# Patient Record
Sex: FEMALE | Race: WHITE | Employment: UNEMPLOYED | URBAN - METROPOLITAN AREA
[De-identification: names, ages, dates, MRNs, and addresses within clinical notes are randomized per-mention and may not be internally consistent; named-entity substitution may affect disease eponyms.]

---

## 2017-02-03 ENCOUNTER — TELEPHONE (OUTPATIENT)
Dept: FAMILY MEDICINE CLINIC | Age: 57
End: 2017-02-03

## 2017-02-03 RX ORDER — CYCLOBENZAPRINE HCL 10 MG
10 TABLET ORAL 2 TIMES DAILY PRN
Qty: 30 TABLET | Refills: 0 | Status: SHIPPED | OUTPATIENT
Start: 2017-02-03 | End: 2017-02-13

## 2017-02-15 ENCOUNTER — TELEPHONE (OUTPATIENT)
Dept: FAMILY MEDICINE CLINIC | Age: 57
End: 2017-02-15

## 2017-02-17 RX ORDER — BUTALBITAL, ACETAMINOPHEN AND CAFFEINE 50; 325; 40 MG/1; MG/1; MG/1
TABLET ORAL
Qty: 60 TABLET | Refills: 5 | Status: SHIPPED | OUTPATIENT
Start: 2017-02-17 | End: 2017-12-06 | Stop reason: SDUPTHER

## 2017-03-01 ENCOUNTER — OFFICE VISIT (OUTPATIENT)
Dept: FAMILY MEDICINE CLINIC | Age: 57
End: 2017-03-01

## 2017-03-01 VITALS
TEMPERATURE: 98.3 F | SYSTOLIC BLOOD PRESSURE: 132 MMHG | DIASTOLIC BLOOD PRESSURE: 86 MMHG | BODY MASS INDEX: 20.5 KG/M2 | WEIGHT: 130.6 LBS | RESPIRATION RATE: 16 BRPM | HEIGHT: 67 IN | HEART RATE: 80 BPM

## 2017-03-01 DIAGNOSIS — G89.29 OTHER CHRONIC PAIN: Primary | ICD-10-CM

## 2017-03-01 DIAGNOSIS — G43.909 MIGRAINE WITHOUT STATUS MIGRAINOSUS, NOT INTRACTABLE, UNSPECIFIED MIGRAINE TYPE: ICD-10-CM

## 2017-03-01 DIAGNOSIS — G25.81 RLS (RESTLESS LEGS SYNDROME): ICD-10-CM

## 2017-03-01 DIAGNOSIS — M41.24 OTHER IDIOPATHIC SCOLIOSIS, THORACIC REGION: ICD-10-CM

## 2017-03-01 PROCEDURE — 3017F COLORECTAL CA SCREEN DOC REV: CPT | Performed by: FAMILY MEDICINE

## 2017-03-01 PROCEDURE — 99213 OFFICE O/P EST LOW 20 MIN: CPT | Performed by: FAMILY MEDICINE

## 2017-03-01 PROCEDURE — G8420 CALC BMI NORM PARAMETERS: HCPCS | Performed by: FAMILY MEDICINE

## 2017-03-01 PROCEDURE — 3014F SCREEN MAMMO DOC REV: CPT | Performed by: FAMILY MEDICINE

## 2017-03-01 PROCEDURE — G8484 FLU IMMUNIZE NO ADMIN: HCPCS | Performed by: FAMILY MEDICINE

## 2017-03-01 PROCEDURE — G8427 DOCREV CUR MEDS BY ELIG CLIN: HCPCS | Performed by: FAMILY MEDICINE

## 2017-03-01 PROCEDURE — 1036F TOBACCO NON-USER: CPT | Performed by: FAMILY MEDICINE

## 2017-03-01 RX ORDER — HYDROCODONE BITARTRATE AND IBUPROFEN 7.5; 2 MG/1; MG/1
TABLET, FILM COATED ORAL
Qty: 450 TABLET | Refills: 0 | Status: SHIPPED | OUTPATIENT
Start: 2017-03-01 | End: 2017-06-02 | Stop reason: SDUPTHER

## 2017-03-01 RX ORDER — TIZANIDINE 4 MG/1
4 TABLET ORAL EVERY 8 HOURS PRN
Qty: 30 TABLET | Refills: 3 | Status: SHIPPED | OUTPATIENT
Start: 2017-03-01 | End: 2018-05-08

## 2017-03-01 RX ORDER — ZOLPIDEM TARTRATE 10 MG/1
TABLET ORAL
Qty: 90 TABLET | Refills: 1 | Status: SHIPPED | OUTPATIENT
Start: 2017-03-01 | End: 2017-06-02 | Stop reason: SDUPTHER

## 2017-03-01 ASSESSMENT — ENCOUNTER SYMPTOMS
COUGH: 0
CONSTIPATION: 0
EYES NEGATIVE: 1
VOMITING: 0
SHORTNESS OF BREATH: 0
NAUSEA: 0
ABDOMINAL PAIN: 0
DIARRHEA: 0

## 2017-03-10 RX ORDER — ESTRADIOL 0.1 MG/G
CREAM VAGINAL
Qty: 42.5 G | Refills: 1 | Status: SHIPPED | OUTPATIENT
Start: 2017-03-10 | End: 2017-05-25 | Stop reason: SDUPTHER

## 2017-03-23 ENCOUNTER — HOSPITAL ENCOUNTER (OUTPATIENT)
Dept: GENERAL RADIOLOGY | Age: 57
Discharge: HOME | End: 2017-03-23
Payer: COMMERCIAL

## 2017-03-23 DIAGNOSIS — Z13.820 SCREENING FOR OSTEOPOROSIS: ICD-10-CM

## 2017-03-23 PROCEDURE — 77080 DXA BONE DENSITY AXIAL: CPT

## 2017-04-05 ENCOUNTER — OFFICE VISIT (OUTPATIENT)
Dept: FAMILY MEDICINE CLINIC | Age: 57
End: 2017-04-05

## 2017-04-05 ENCOUNTER — TELEPHONE (OUTPATIENT)
Dept: FAMILY MEDICINE CLINIC | Age: 57
End: 2017-04-05

## 2017-04-05 VITALS
DIASTOLIC BLOOD PRESSURE: 86 MMHG | TEMPERATURE: 97.5 F | SYSTOLIC BLOOD PRESSURE: 130 MMHG | RESPIRATION RATE: 20 BRPM | HEART RATE: 72 BPM | BODY MASS INDEX: 20.09 KG/M2 | HEIGHT: 66 IN | WEIGHT: 125 LBS

## 2017-04-05 DIAGNOSIS — I73.9 CLAUDICATION (HCC): Primary | ICD-10-CM

## 2017-04-05 DIAGNOSIS — E55.9 VITAMIN D DEFICIENCY: ICD-10-CM

## 2017-04-05 DIAGNOSIS — G89.29 OTHER CHRONIC PAIN: ICD-10-CM

## 2017-04-05 DIAGNOSIS — G25.81 RLS (RESTLESS LEGS SYNDROME): ICD-10-CM

## 2017-04-05 DIAGNOSIS — M41.00 INFANTILE IDIOPATHIC SCOLIOSIS, UNSPECIFIED SPINAL REGION: ICD-10-CM

## 2017-04-05 DIAGNOSIS — Z00.00 ROUTINE GENERAL MEDICAL EXAMINATION AT A HEALTH CARE FACILITY: ICD-10-CM

## 2017-04-05 DIAGNOSIS — I73.9 CLAUDICATION (HCC): ICD-10-CM

## 2017-04-05 DIAGNOSIS — Z00.00 ROUTINE GENERAL MEDICAL EXAMINATION AT A HEALTH CARE FACILITY: Primary | ICD-10-CM

## 2017-04-05 LAB
ALBUMIN SERPL-MCNC: 4.6 G/DL (ref 3.9–4.9)
ALP BLD-CCNC: 107 U/L (ref 40–130)
ALT SERPL-CCNC: 12 U/L (ref 0–33)
ANION GAP SERPL CALCULATED.3IONS-SCNC: 13 MEQ/L (ref 7–13)
AST SERPL-CCNC: 19 U/L (ref 0–35)
BILIRUB SERPL-MCNC: 0.3 MG/DL (ref 0–1.2)
BUN BLDV-MCNC: 18 MG/DL (ref 6–20)
CALCIUM SERPL-MCNC: 9.5 MG/DL (ref 8.6–10.2)
CHLORIDE BLD-SCNC: 101 MEQ/L (ref 98–107)
CHOLESTEROL, TOTAL: 223 MG/DL (ref 0–199)
CO2: 26 MEQ/L (ref 22–29)
CREAT SERPL-MCNC: 0.69 MG/DL (ref 0.5–0.9)
GFR AFRICAN AMERICAN: >60
GFR NON-AFRICAN AMERICAN: >60
GLOBULIN: 2.5 G/DL (ref 2.3–3.5)
GLUCOSE BLD-MCNC: 89 MG/DL (ref 74–109)
HDLC SERPL-MCNC: 77 MG/DL (ref 40–59)
LDL CHOLESTEROL CALCULATED: 125 MG/DL (ref 0–129)
POTASSIUM SERPL-SCNC: 4.3 MEQ/L (ref 3.5–5.1)
SODIUM BLD-SCNC: 140 MEQ/L (ref 132–144)
TOTAL PROTEIN: 7.1 G/DL (ref 6.4–8.1)
TRIGL SERPL-MCNC: 104 MG/DL (ref 0–200)
VITAMIN D 25-HYDROXY: 35.7 NG/ML (ref 30–100)

## 2017-04-05 PROCEDURE — 1036F TOBACCO NON-USER: CPT | Performed by: FAMILY MEDICINE

## 2017-04-05 PROCEDURE — 3014F SCREEN MAMMO DOC REV: CPT | Performed by: FAMILY MEDICINE

## 2017-04-05 PROCEDURE — 3017F COLORECTAL CA SCREEN DOC REV: CPT | Performed by: FAMILY MEDICINE

## 2017-04-05 PROCEDURE — G8420 CALC BMI NORM PARAMETERS: HCPCS | Performed by: FAMILY MEDICINE

## 2017-04-05 PROCEDURE — G8427 DOCREV CUR MEDS BY ELIG CLIN: HCPCS | Performed by: FAMILY MEDICINE

## 2017-04-05 PROCEDURE — 99213 OFFICE O/P EST LOW 20 MIN: CPT | Performed by: FAMILY MEDICINE

## 2017-04-05 RX ORDER — COLCHICINE 0.6 MG/1
CAPSULE ORAL
Refills: 5 | COMMUNITY
Start: 2017-03-10 | End: 2017-04-05 | Stop reason: SDUPTHER

## 2017-04-05 RX ORDER — IBANDRONATE SODIUM 150 MG/1
150 TABLET, FILM COATED ORAL
Qty: 30 TABLET | Refills: 5 | Status: SHIPPED | OUTPATIENT
Start: 2017-04-05 | End: 2018-05-08

## 2017-04-05 RX ORDER — NITROFURANTOIN MACROCRYSTALS 50 MG/1
CAPSULE ORAL
COMMUNITY
Start: 2017-03-31 | End: 2017-09-01 | Stop reason: ALTCHOICE

## 2017-04-05 RX ORDER — IBANDRONATE SODIUM 150 MG/1
150 TABLET, FILM COATED ORAL
Qty: 30 TABLET | Refills: 5 | OUTPATIENT
Start: 2017-04-05 | End: 2017-04-05 | Stop reason: SDUPTHER

## 2017-04-07 ENCOUNTER — TELEPHONE (OUTPATIENT)
Dept: FAMILY MEDICINE CLINIC | Age: 57
End: 2017-04-07

## 2017-04-10 ENCOUNTER — TELEPHONE (OUTPATIENT)
Dept: FAMILY MEDICINE CLINIC | Age: 57
End: 2017-04-10

## 2017-04-12 ENCOUNTER — HOSPITAL ENCOUNTER (OUTPATIENT)
Dept: ULTRASOUND IMAGING | Age: 57
Discharge: HOME | End: 2017-04-12
Payer: COMMERCIAL

## 2017-04-12 DIAGNOSIS — I73.9 CLAUDICATION (HCC): ICD-10-CM

## 2017-04-12 PROCEDURE — 93924 LWR XTR VASC STDY BILAT: CPT

## 2017-04-25 ENCOUNTER — TELEPHONE (OUTPATIENT)
Dept: FAMILY MEDICINE CLINIC | Age: 57
End: 2017-04-25

## 2017-05-25 RX ORDER — ESTRADIOL 0.1 MG/G
CREAM VAGINAL
Qty: 42.5 G | Refills: 0 | Status: SHIPPED | OUTPATIENT
Start: 2017-05-25 | End: 2017-09-01 | Stop reason: SDUPTHER

## 2017-06-02 ENCOUNTER — OFFICE VISIT (OUTPATIENT)
Dept: FAMILY MEDICINE CLINIC | Age: 57
End: 2017-06-02

## 2017-06-02 VITALS
RESPIRATION RATE: 12 BRPM | TEMPERATURE: 96.3 F | BODY MASS INDEX: 20.12 KG/M2 | SYSTOLIC BLOOD PRESSURE: 116 MMHG | HEART RATE: 60 BPM | HEIGHT: 66 IN | WEIGHT: 125.2 LBS | DIASTOLIC BLOOD PRESSURE: 82 MMHG

## 2017-06-02 DIAGNOSIS — G43.909 MIGRAINE WITHOUT STATUS MIGRAINOSUS, NOT INTRACTABLE, UNSPECIFIED MIGRAINE TYPE: ICD-10-CM

## 2017-06-02 DIAGNOSIS — G89.29 OTHER CHRONIC PAIN: ICD-10-CM

## 2017-06-02 DIAGNOSIS — M41.24 OTHER IDIOPATHIC SCOLIOSIS, THORACIC REGION: Primary | ICD-10-CM

## 2017-06-02 PROCEDURE — G8420 CALC BMI NORM PARAMETERS: HCPCS | Performed by: FAMILY MEDICINE

## 2017-06-02 PROCEDURE — 99213 OFFICE O/P EST LOW 20 MIN: CPT | Performed by: FAMILY MEDICINE

## 2017-06-02 PROCEDURE — 3014F SCREEN MAMMO DOC REV: CPT | Performed by: FAMILY MEDICINE

## 2017-06-02 PROCEDURE — G8427 DOCREV CUR MEDS BY ELIG CLIN: HCPCS | Performed by: FAMILY MEDICINE

## 2017-06-02 PROCEDURE — 3017F COLORECTAL CA SCREEN DOC REV: CPT | Performed by: FAMILY MEDICINE

## 2017-06-02 PROCEDURE — 1036F TOBACCO NON-USER: CPT | Performed by: FAMILY MEDICINE

## 2017-06-02 RX ORDER — MEPERIDINE HYDROCHLORIDE 50 MG/1
50 TABLET ORAL EVERY 8 HOURS PRN
Qty: 6 TABLET | Refills: 0 | Status: SHIPPED | OUTPATIENT
Start: 2017-06-02 | End: 2017-06-12

## 2017-06-02 RX ORDER — HYDROCODONE BITARTRATE AND IBUPROFEN 7.5; 2 MG/1; MG/1
TABLET, FILM COATED ORAL
Qty: 450 TABLET | Refills: 0 | Status: SHIPPED | OUTPATIENT
Start: 2017-06-02 | End: 2017-09-01 | Stop reason: SDUPTHER

## 2017-06-02 RX ORDER — ZOLPIDEM TARTRATE 10 MG/1
TABLET ORAL
Qty: 90 TABLET | Refills: 1 | Status: SHIPPED | OUTPATIENT
Start: 2017-06-02 | End: 2017-09-01 | Stop reason: SDUPTHER

## 2017-06-02 ASSESSMENT — ENCOUNTER SYMPTOMS
NAUSEA: 0
DIARRHEA: 0
SHORTNESS OF BREATH: 0
COUGH: 0
CONSTIPATION: 0
ABDOMINAL PAIN: 0
EYES NEGATIVE: 1

## 2017-07-07 RX ORDER — CLORAZEPATE DIPOTASSIUM 7.5 MG/1
TABLET ORAL
Qty: 60 TABLET | Refills: 1 | OUTPATIENT
Start: 2017-07-07 | End: 2018-02-01 | Stop reason: SDUPTHER

## 2017-07-14 ENCOUNTER — TELEPHONE (OUTPATIENT)
Dept: FAMILY MEDICINE CLINIC | Age: 57
End: 2017-07-14

## 2017-07-31 DIAGNOSIS — G89.29 OTHER CHRONIC PAIN: ICD-10-CM

## 2017-07-31 DIAGNOSIS — M04.1 FMF (FAMILIAL MEDITERRANEAN FEVER) (HCC): ICD-10-CM

## 2017-07-31 RX ORDER — DRONABINOL 5 MG/1
5 CAPSULE ORAL
Qty: 60 CAPSULE | Refills: 1 | Status: SHIPPED | OUTPATIENT
Start: 2017-07-31 | End: 2018-09-06 | Stop reason: SDUPTHER

## 2017-08-22 RX ORDER — ESTRADIOL 0.1 MG/G
CREAM VAGINAL
Qty: 42.5 G | Refills: 1 | Status: SHIPPED | OUTPATIENT
Start: 2017-08-22 | End: 2017-10-29 | Stop reason: SDUPTHER

## 2017-09-01 ENCOUNTER — OFFICE VISIT (OUTPATIENT)
Dept: FAMILY MEDICINE CLINIC | Age: 57
End: 2017-09-01

## 2017-09-01 VITALS
RESPIRATION RATE: 12 BRPM | BODY MASS INDEX: 19.29 KG/M2 | WEIGHT: 120 LBS | SYSTOLIC BLOOD PRESSURE: 110 MMHG | HEIGHT: 66 IN | HEART RATE: 60 BPM | DIASTOLIC BLOOD PRESSURE: 84 MMHG

## 2017-09-01 DIAGNOSIS — G43.009 NONINTRACTABLE MIGRAINE, UNSPECIFIED MIGRAINE TYPE: ICD-10-CM

## 2017-09-01 DIAGNOSIS — G43.009 MIGRAINE WITHOUT AURA AND WITHOUT STATUS MIGRAINOSUS, NOT INTRACTABLE: ICD-10-CM

## 2017-09-01 DIAGNOSIS — G89.29 OTHER CHRONIC PAIN: Primary | ICD-10-CM

## 2017-09-01 DIAGNOSIS — G47.00 INSOMNIA, UNSPECIFIED TYPE: ICD-10-CM

## 2017-09-01 PROCEDURE — G8427 DOCREV CUR MEDS BY ELIG CLIN: HCPCS | Performed by: FAMILY MEDICINE

## 2017-09-01 PROCEDURE — 99213 OFFICE O/P EST LOW 20 MIN: CPT | Performed by: FAMILY MEDICINE

## 2017-09-01 PROCEDURE — 3014F SCREEN MAMMO DOC REV: CPT | Performed by: FAMILY MEDICINE

## 2017-09-01 PROCEDURE — 3017F COLORECTAL CA SCREEN DOC REV: CPT | Performed by: FAMILY MEDICINE

## 2017-09-01 PROCEDURE — 1036F TOBACCO NON-USER: CPT | Performed by: FAMILY MEDICINE

## 2017-09-01 PROCEDURE — G8420 CALC BMI NORM PARAMETERS: HCPCS | Performed by: FAMILY MEDICINE

## 2017-09-01 RX ORDER — BUTALBITAL, ACETAMINOPHEN AND CAFFEINE 50; 325; 40 MG/1; MG/1; MG/1
2 TABLET ORAL
COMMUNITY
Start: 2015-12-30 | End: 2017-09-11 | Stop reason: SDUPTHER

## 2017-09-01 RX ORDER — ZOLPIDEM TARTRATE 10 MG/1
TABLET ORAL
Qty: 90 TABLET | Refills: 1 | Status: SHIPPED | OUTPATIENT
Start: 2017-09-01 | End: 2018-03-01 | Stop reason: SDUPTHER

## 2017-09-01 RX ORDER — HYDROCODONE BITARTRATE AND IBUPROFEN 7.5; 2 MG/1; MG/1
TABLET, FILM COATED ORAL
Qty: 450 TABLET | Refills: 0 | Status: SHIPPED | OUTPATIENT
Start: 2017-09-01 | End: 2017-12-01 | Stop reason: SDUPTHER

## 2017-09-01 RX ORDER — METHYLPREDNISOLONE 4 MG/1
TABLET ORAL
Qty: 1 KIT | Refills: 0 | Status: SHIPPED | OUTPATIENT
Start: 2017-09-01 | End: 2017-10-09 | Stop reason: ALTCHOICE

## 2017-09-01 RX ORDER — MEPERIDINE HYDROCHLORIDE 50 MG/1
TABLET ORAL
Qty: 6 TABLET | Refills: 0 | Status: SHIPPED | OUTPATIENT
Start: 2017-09-01 | End: 2018-05-02

## 2017-09-01 RX ORDER — MEPERIDINE HYDROCHLORIDE 50 MG/1
TABLET ORAL
COMMUNITY
Start: 2017-06-09 | End: 2017-09-01 | Stop reason: SDUPTHER

## 2017-09-01 ASSESSMENT — ENCOUNTER SYMPTOMS
EYES NEGATIVE: 1
NAUSEA: 0
ABDOMINAL PAIN: 0
DIARRHEA: 0
COUGH: 0
CONSTIPATION: 0
SHORTNESS OF BREATH: 0

## 2017-09-01 ASSESSMENT — PATIENT HEALTH QUESTIONNAIRE - PHQ9
1. LITTLE INTEREST OR PLEASURE IN DOING THINGS: 0
2. FEELING DOWN, DEPRESSED OR HOPELESS: 0
SUM OF ALL RESPONSES TO PHQ QUESTIONS 1-9: 0
SUM OF ALL RESPONSES TO PHQ9 QUESTIONS 1 & 2: 0

## 2017-09-11 ENCOUNTER — OFFICE VISIT (OUTPATIENT)
Dept: FAMILY MEDICINE CLINIC | Age: 57
End: 2017-09-11

## 2017-09-11 VITALS
HEART RATE: 78 BPM | TEMPERATURE: 98.6 F | WEIGHT: 118.2 LBS | HEIGHT: 66 IN | BODY MASS INDEX: 18.99 KG/M2 | SYSTOLIC BLOOD PRESSURE: 118 MMHG | RESPIRATION RATE: 12 BRPM | DIASTOLIC BLOOD PRESSURE: 80 MMHG

## 2017-09-11 DIAGNOSIS — M79.10 MYALGIA: ICD-10-CM

## 2017-09-11 DIAGNOSIS — R50.9 FEVER, UNSPECIFIED FEVER CAUSE: ICD-10-CM

## 2017-09-11 DIAGNOSIS — M04.1 FAMILIAL MEDITERRANEAN FEVER (HCC): ICD-10-CM

## 2017-09-11 DIAGNOSIS — M25.50 ARTHRALGIA, UNSPECIFIED JOINT: Primary | ICD-10-CM

## 2017-09-11 LAB
BASOPHILS ABSOLUTE: 0 K/UL (ref 0–0.2)
BASOPHILS RELATIVE PERCENT: 0.3 %
EOSINOPHILS ABSOLUTE: 0.1 K/UL (ref 0–0.7)
EOSINOPHILS RELATIVE PERCENT: 0.5 %
HCT VFR BLD CALC: 42.8 % (ref 37–47)
HEMOGLOBIN: 13.9 G/DL (ref 12–16)
LYMPHOCYTES ABSOLUTE: 3 K/UL (ref 1–4.8)
LYMPHOCYTES RELATIVE PERCENT: 28.1 %
MCH RBC QN AUTO: 28.3 PG (ref 27–31.3)
MCHC RBC AUTO-ENTMCNC: 32.5 % (ref 33–37)
MCV RBC AUTO: 86.9 FL (ref 82–100)
MONOCYTES ABSOLUTE: 0.5 K/UL (ref 0.2–0.8)
MONOCYTES RELATIVE PERCENT: 4.8 %
NEUTROPHILS ABSOLUTE: 7 K/UL (ref 1.4–6.5)
NEUTROPHILS RELATIVE PERCENT: 66.3 %
PDW BLD-RTO: 13.9 % (ref 11.5–14.5)
PLATELET # BLD: 201 K/UL (ref 130–400)
RBC # BLD: 4.92 M/UL (ref 4.2–5.4)
SEDIMENTATION RATE, ERYTHROCYTE: 5 MM (ref 0–30)
WBC # BLD: 10.6 K/UL (ref 4.8–10.8)

## 2017-09-11 PROCEDURE — 3017F COLORECTAL CA SCREEN DOC REV: CPT | Performed by: FAMILY MEDICINE

## 2017-09-11 PROCEDURE — 99213 OFFICE O/P EST LOW 20 MIN: CPT | Performed by: FAMILY MEDICINE

## 2017-09-11 PROCEDURE — G8420 CALC BMI NORM PARAMETERS: HCPCS | Performed by: FAMILY MEDICINE

## 2017-09-11 PROCEDURE — 3014F SCREEN MAMMO DOC REV: CPT | Performed by: FAMILY MEDICINE

## 2017-09-11 PROCEDURE — 1036F TOBACCO NON-USER: CPT | Performed by: FAMILY MEDICINE

## 2017-09-11 PROCEDURE — G8427 DOCREV CUR MEDS BY ELIG CLIN: HCPCS | Performed by: FAMILY MEDICINE

## 2017-09-11 RX ORDER — METOCLOPRAMIDE 10 MG/1
TABLET ORAL
Qty: 40 TABLET | Refills: 1 | Status: SHIPPED | OUTPATIENT
Start: 2017-09-11 | End: 2017-12-16 | Stop reason: SDUPTHER

## 2017-09-11 ASSESSMENT — ENCOUNTER SYMPTOMS
DIARRHEA: 0
COUGH: 0
CONSTIPATION: 0
EYES NEGATIVE: 1
SHORTNESS OF BREATH: 0
ABDOMINAL PAIN: 0

## 2017-09-23 ASSESSMENT — ENCOUNTER SYMPTOMS
SORE THROAT: 0
NAUSEA: 1

## 2017-10-09 ENCOUNTER — OFFICE VISIT (OUTPATIENT)
Dept: FAMILY MEDICINE CLINIC | Age: 57
End: 2017-10-09

## 2017-10-09 VITALS
SYSTOLIC BLOOD PRESSURE: 116 MMHG | HEIGHT: 66 IN | RESPIRATION RATE: 16 BRPM | DIASTOLIC BLOOD PRESSURE: 84 MMHG | WEIGHT: 117.2 LBS | BODY MASS INDEX: 18.84 KG/M2 | TEMPERATURE: 96.9 F | HEART RATE: 76 BPM

## 2017-10-09 DIAGNOSIS — G89.29 OTHER CHRONIC PAIN: ICD-10-CM

## 2017-10-09 DIAGNOSIS — R51.9 HEADACHE, UNSPECIFIED HEADACHE TYPE: ICD-10-CM

## 2017-10-09 DIAGNOSIS — R42 DIZZINESS: Primary | ICD-10-CM

## 2017-10-09 PROCEDURE — 3014F SCREEN MAMMO DOC REV: CPT | Performed by: FAMILY MEDICINE

## 2017-10-09 PROCEDURE — G8484 FLU IMMUNIZE NO ADMIN: HCPCS | Performed by: FAMILY MEDICINE

## 2017-10-09 PROCEDURE — 1036F TOBACCO NON-USER: CPT | Performed by: FAMILY MEDICINE

## 2017-10-09 PROCEDURE — G8420 CALC BMI NORM PARAMETERS: HCPCS | Performed by: FAMILY MEDICINE

## 2017-10-09 PROCEDURE — 3017F COLORECTAL CA SCREEN DOC REV: CPT | Performed by: FAMILY MEDICINE

## 2017-10-09 PROCEDURE — G8427 DOCREV CUR MEDS BY ELIG CLIN: HCPCS | Performed by: FAMILY MEDICINE

## 2017-10-09 PROCEDURE — 99213 OFFICE O/P EST LOW 20 MIN: CPT | Performed by: FAMILY MEDICINE

## 2017-10-09 RX ORDER — MECLIZINE HYDROCHLORIDE 25 MG/1
25 TABLET ORAL 3 TIMES DAILY PRN
Qty: 30 TABLET | Refills: 1 | Status: SHIPPED | OUTPATIENT
Start: 2017-10-09 | End: 2017-10-19

## 2017-10-09 ASSESSMENT — ENCOUNTER SYMPTOMS
ABDOMINAL PAIN: 0
SHORTNESS OF BREATH: 0
DIARRHEA: 0
COUGH: 0
CONSTIPATION: 0
NAUSEA: 0

## 2017-10-23 DIAGNOSIS — Z12.31 VISIT FOR SCREENING MAMMOGRAM: Primary | ICD-10-CM

## 2017-10-23 DIAGNOSIS — Z98.82 H/O BREAST IMPLANT: ICD-10-CM

## 2017-10-24 DIAGNOSIS — Z98.82 HISTORY OF BREAST IMPLANT: ICD-10-CM

## 2017-10-24 DIAGNOSIS — Z12.31 VISIT FOR SCREENING MAMMOGRAM: Primary | ICD-10-CM

## 2017-10-30 RX ORDER — ESTRADIOL 0.1 MG/G
CREAM VAGINAL
Qty: 42.5 G | Refills: 1 | Status: SHIPPED | OUTPATIENT
Start: 2017-10-30 | End: 2017-12-16 | Stop reason: SDUPTHER

## 2017-11-24 ENCOUNTER — HOSPITAL ENCOUNTER (OUTPATIENT)
Dept: WOMENS IMAGING | Age: 57
Discharge: HOME | End: 2017-11-24
Payer: COMMERCIAL

## 2017-11-24 DIAGNOSIS — Z98.82 HISTORY OF BREAST IMPLANT: ICD-10-CM

## 2017-11-24 DIAGNOSIS — Z12.31 VISIT FOR SCREENING MAMMOGRAM: ICD-10-CM

## 2017-11-24 PROCEDURE — 77063 BREAST TOMOSYNTHESIS BI: CPT

## 2017-12-01 ENCOUNTER — OFFICE VISIT (OUTPATIENT)
Dept: FAMILY MEDICINE CLINIC | Age: 57
End: 2017-12-01

## 2017-12-01 VITALS
WEIGHT: 119 LBS | BODY MASS INDEX: 19.13 KG/M2 | HEIGHT: 66 IN | RESPIRATION RATE: 16 BRPM | HEART RATE: 76 BPM | SYSTOLIC BLOOD PRESSURE: 114 MMHG | TEMPERATURE: 97.9 F | DIASTOLIC BLOOD PRESSURE: 80 MMHG

## 2017-12-01 DIAGNOSIS — E06.3 HASHIMOTO'S THYROIDITIS: ICD-10-CM

## 2017-12-01 DIAGNOSIS — G89.29 OTHER CHRONIC PAIN: ICD-10-CM

## 2017-12-01 DIAGNOSIS — M41.00 INFANTILE IDIOPATHIC SCOLIOSIS, UNSPECIFIED SPINAL REGION: Primary | ICD-10-CM

## 2017-12-01 DIAGNOSIS — G43.909 MIGRAINE WITHOUT STATUS MIGRAINOSUS, NOT INTRACTABLE, UNSPECIFIED MIGRAINE TYPE: ICD-10-CM

## 2017-12-01 PROCEDURE — G8420 CALC BMI NORM PARAMETERS: HCPCS | Performed by: FAMILY MEDICINE

## 2017-12-01 PROCEDURE — G8427 DOCREV CUR MEDS BY ELIG CLIN: HCPCS | Performed by: FAMILY MEDICINE

## 2017-12-01 PROCEDURE — 1036F TOBACCO NON-USER: CPT | Performed by: FAMILY MEDICINE

## 2017-12-01 PROCEDURE — G8484 FLU IMMUNIZE NO ADMIN: HCPCS | Performed by: FAMILY MEDICINE

## 2017-12-01 PROCEDURE — 99213 OFFICE O/P EST LOW 20 MIN: CPT | Performed by: FAMILY MEDICINE

## 2017-12-01 PROCEDURE — 3014F SCREEN MAMMO DOC REV: CPT | Performed by: FAMILY MEDICINE

## 2017-12-01 PROCEDURE — 3017F COLORECTAL CA SCREEN DOC REV: CPT | Performed by: FAMILY MEDICINE

## 2017-12-01 RX ORDER — HYDROCODONE BITARTRATE AND IBUPROFEN 7.5; 2 MG/1; MG/1
TABLET, FILM COATED ORAL
Qty: 450 TABLET | Refills: 0 | Status: SHIPPED | OUTPATIENT
Start: 2017-12-01 | End: 2018-01-31 | Stop reason: SDUPTHER

## 2017-12-01 RX ORDER — HYDROCODONE BITARTRATE AND IBUPROFEN 7.5; 2 MG/1; MG/1
TABLET, FILM COATED ORAL
Qty: 450 TABLET | Refills: 0 | Status: SHIPPED | OUTPATIENT
Start: 2017-12-01 | End: 2017-12-01 | Stop reason: CLARIF

## 2017-12-01 ASSESSMENT — ENCOUNTER SYMPTOMS
ABDOMINAL PAIN: 0
COUGH: 0
SHORTNESS OF BREATH: 0
BACK PAIN: 1
CHEST TIGHTNESS: 0
CONSTIPATION: 0
RHINORRHEA: 0
SINUS PRESSURE: 0
BLOOD IN STOOL: 0
WHEEZING: 0
DIARRHEA: 0

## 2017-12-01 NOTE — PROGRESS NOTES
12/02/14    DR Brissa Mittal     Social History     Social History    Marital status:      Spouse name: Joey Galvan     Number of children: 1    Years of education: N/A     Occupational History     Other     Social History Main Topics    Smoking status: Former Smoker     Packs/day: 0.25     Years: 15.00     Types: Cigarettes     Start date: 1/1/1985     Quit date: 1/1/2000    Smokeless tobacco: Never Used    Alcohol use 0.0 oz/week    Drug use: No    Sexual activity: Not on file     Other Topics Concern    Not on file     Social History Narrative    No narrative on file     Family History   Problem Relation Age of Onset    Diabetes Mother     Glaucoma Mother      Allergies   Allergen Reactions    Levofloxacin Hives     JOINT SWELLING     Morphine Itching    Pcn [Penicillins] Hives     JOINT SWELLING     Pentazocine Lactate     Ultram [Tramadol Hcl] Itching    Cephalexin Rash     Current Outpatient Prescriptions   Medication Sig Dispense Refill    Handicap Placard MISC by Does not apply route Good for 5 years. 1 each 0    HYDROcodone-ibuprofen (VICOPROFEN) 7.5-200 MG per tablet 1- 2 Q 6 hrs prn 90 DAY SUPPLY. 450 tablet 0    ESTRACE VAGINAL 0.1 MG/GM vaginal cream PLACE 1 GRAM VAGINALLY DAILY 42.5 g 1    metoclopramide (REGLAN) 10 MG tablet TID PRN NAUSEA 40 tablet 1    zolpidem (AMBIEN) 10 MG tablet 1 PO QHS 90 tablet 1    meperidine (DEMEROL) 50 MG tablet TK 1 T PO Q 8 H PRF SEVERE PAIN OR MIGRAINE. Earliest Fill Date: 9/1/17 6 tablet 0    dronabinol (MARINOL) 5 MG capsule Take 1 capsule by mouth 2 times daily (before meals) 60 capsule 1    clorazepate (TRANXENE-T) 7.5 MG tablet BID PRN ANXIETY 60 tablet 1    carbidopa-levodopa (SINEMET)  MG per tablet TAKE 1 TABLET BY MOUTH THREE TIMES DAILY 90 tablet 0    ibandronate (BONIVA) 150 MG tablet Take 1 tablet by mouth every 30 days Take in AM with 8oz of water on empty stomach.  do not take other meds or lie down for 30 minutes

## 2017-12-01 NOTE — LETTER
reduced coughing, which are especially dangerous for patients with lung disease. Overdose or dangerous interactions with alcohol and other medications may occur, leading to death. Hyperalgesia may develop, in which patients receiving opioids for the treatment of pain may actually become more sensitive to certain painful stimuli, and in some cases, experience pain from ordinarily non-painful stimuli. Women between the ages of 14-53 who could become pregnant should carefully weigh the risks and benefits of opioids with their physicians, as these medications increase the risk of pregnancy complications, including miscarriage,  delivery and stillbirth. It is also possible for babies to be born addicted to opioids. Opioid dependence withdrawal symptoms may include; feelings of uneasiness, increased pain, irritability, belly pain, diarrhea, sweats and goose-flesh. Benzodiazepines and non-benzodiazepine sleep medications: These medications can lead to problems such as addiction/dependence, sedation, fatigue, lightheadedness, dizziness, incoordination, falls, depression, hallucinations, and impaired judgment, memory and concentration. The ability to drive and operate machinery may also be affected. Abnormal sleep-related behaviors have been reported, including sleep walking, driving, making telephone calls, eating, or having sex while not fully awake. These medications can suppress breathing and worsen sleep apnea, particularly when combined with alcohol or other sedating medications, potentially leading to death. Dependence withdrawal symptoms may include tremors, anxiety, hallucinations and seizures. Stimulants:  Common adverse effects include addiction/dependence, increased blood pressure and heart rate, decreased appetite, nausea, involuntary weight loss, insomnia, irritability, and headaches.   These risks may increase when these medications are combined with other · I agree to participate in any medical, psychological or psychiatric assessments recommended by my provider. · I will actively participate in any program designed to improve function, including social, physical, psychological and daily or work activities. 2. I will not use illegal or street drugs or another person's prescription. If I have an addiction problem with drugs or alcohol and my provider asks me to enter a program to address this issue, I agree to follow through. Such programs may include:  · 12-Step program and securing a sponsor  · Individual counseling   · Inpatient or outpatient treatment  · Other:_____________________________________________________________________________________________________________________________________________    If in treatment, I will request that a copy of the programs initial evaluation and treatment recommendations be sent to this provider and will not expect refills until that is received. I will also request written monthly updates be sent to this provider to verify my continuing treatment. 3. I will consent to drug screening upon my providers request to assure I am only taking the prescribed drugs, described in this MEDICATION AGREEMENT. I understand that a drug screen is a laboratory test in which a sample of my urine, blood or saliva is checked to see what drugs I have been taking. 4. I agree that I will treat the providers and staff at this office with respect at all times. I will keep all of my scheduled appointments, but if I need to cancel my appointment, I will do so a minimum of 24 hours before it is scheduled. 5. I understand that this provider may stop prescribing the medications listed if:  · I do not show any improvement in pain, or my activity has not improved. · I develop rapid tolerance or loss of improvement, as described in my treatment plan. · I develop significant side effects from the medication. · My behavior is inconsistent with the responsibilities outlined above, which may also result in my being prevented from receiving further care from this office. · Other:____________________________________________________________________    AGREEMENT:    I have read the above and have had all of my questions answered. For chronic disease management, I know that my symptoms can be managed with many types of treatments. A chronic medication trial may be part of my treatment, but I must be an active participant in my care. Medication therapy is only one part of my symptom management plan. In some cases, there may be limited scientific evidence to support the chronic use of certain medications to improve symptoms and daily function. Furthermore, in certain circumstances, there may be scientific information that suggests that use of chronic controlled substances may actually worsen my symptoms and increase my risk of unintentional death directly related to this medication therapy. I know that if my provider feels my risk from controlled medications is greater than my benefit, I will have my controlled substance medication(s) compassionately lowered or removed altogether. I agree to a controlled substance medication trial.      I further agree to allow this office to contact family or friends if there are concerns about my safety and use of the controlled medications. I have agreed to use the following medications above as instructed by my physician and as stated in this [place] [id number].      Patient Signature:  ______________________  Date:12/1/2017 or _____________    Provider Signature:______________________  Date:12/1/2017 or _____________

## 2017-12-07 RX ORDER — BUTALBITAL, ACETAMINOPHEN AND CAFFEINE 50; 325; 40 MG/1; MG/1; MG/1
TABLET ORAL
Qty: 60 TABLET | Refills: 3 | Status: SHIPPED | OUTPATIENT
Start: 2017-12-07 | End: 2018-05-08

## 2017-12-14 ENCOUNTER — TELEPHONE (OUTPATIENT)
Dept: FAMILY MEDICINE CLINIC | Age: 57
End: 2017-12-14

## 2017-12-14 NOTE — TELEPHONE ENCOUNTER
Patient called and is having back surgery a couple weeks. She need to come off the [facility] and get a script for [facility] for a 2 week supply.   Please advise

## 2017-12-15 RX ORDER — HYDROCODONE BITARTRATE AND ACETAMINOPHEN 7.5; 325 MG/1; MG/1
1 TABLET ORAL EVERY 6 HOURS PRN
Qty: 75 TABLET | Refills: 0 | Status: SHIPPED | OUTPATIENT
Start: 2017-12-15 | End: 2018-01-31 | Stop reason: SDUPTHER

## 2017-12-18 RX ORDER — ESTRADIOL 0.1 MG/G
CREAM VAGINAL
Qty: 42.5 G | Refills: 3 | Status: SHIPPED | OUTPATIENT
Start: 2017-12-18 | End: 2018-12-06 | Stop reason: ALTCHOICE

## 2017-12-18 RX ORDER — METOCLOPRAMIDE 10 MG/1
TABLET ORAL
Qty: 40 TABLET | Refills: 3 | Status: SHIPPED | OUTPATIENT
Start: 2017-12-18 | End: 2018-06-01 | Stop reason: ALTCHOICE

## 2018-01-29 ENCOUNTER — TELEPHONE (OUTPATIENT)
Dept: FAMILY MEDICINE CLINIC | Age: 58
End: 2018-01-29

## 2018-01-29 NOTE — TELEPHONE ENCOUNTER
Americo García from [facility] would like for you to give him a call back with an update for Tyrone Spence     PH# [phone]

## 2018-01-29 NOTE — TELEPHONE ENCOUNTER
Spoke with him about Terra's revision surgery. She's been doing very well. They're not recommending physical therapy or any kind of rehab since they feel she knows what to do. They will be following up with her and I should see her back in 4-6 weeks at her convenience.

## 2018-01-31 ENCOUNTER — TELEPHONE (OUTPATIENT)
Dept: FAMILY MEDICINE CLINIC | Age: 58
End: 2018-01-31

## 2018-01-31 DIAGNOSIS — G89.29 OTHER CHRONIC PAIN: ICD-10-CM

## 2018-01-31 DIAGNOSIS — M41.00 INFANTILE IDIOPATHIC SCOLIOSIS, UNSPECIFIED SPINAL REGION: Primary | ICD-10-CM

## 2018-01-31 RX ORDER — HYDROCODONE BITARTRATE AND IBUPROFEN 7.5; 2 MG/1; MG/1
TABLET, FILM COATED ORAL
Qty: 120 TABLET | Refills: 0 | Status: SHIPPED | OUTPATIENT
Start: 2018-01-31 | End: 2018-02-27

## 2018-01-31 RX ORDER — HYDROCODONE BITARTRATE AND ACETAMINOPHEN 7.5; 325 MG/1; MG/1
1 TABLET ORAL EVERY 6 HOURS PRN
Qty: 100 TABLET | Refills: 0 | Status: SHIPPED | OUTPATIENT
Start: 2018-01-31 | End: 2018-02-25

## 2018-02-01 DIAGNOSIS — F41.9 ANXIETY: ICD-10-CM

## 2018-02-01 RX ORDER — CLORAZEPATE DIPOTASSIUM 7.5 MG/1
7.5 TABLET ORAL 2 TIMES DAILY PRN
Qty: 60 TABLET | Refills: 1 | OUTPATIENT
Start: 2018-02-01 | End: 2018-03-03

## 2018-02-13 ENCOUNTER — TELEPHONE (OUTPATIENT)
Dept: FAMILY MEDICINE CLINIC | Age: 58
End: 2018-02-13

## 2018-02-13 DIAGNOSIS — E06.3 HASHIMOTO'S THYROIDITIS: Primary | ICD-10-CM

## 2018-02-13 NOTE — TELEPHONE ENCOUNTER
Please let her know that I would recommend the lower dose. When she runs out let me know and we can arrange for her to get that.   We would obviously then need to repeat her thyroid functions after she's on the new thyroid medicine for about 4 weeks

## 2018-02-21 ENCOUNTER — TELEPHONE (OUTPATIENT)
Dept: FAMILY MEDICINE CLINIC | Age: 58
End: 2018-02-21

## 2018-02-21 DIAGNOSIS — E06.3 HASHIMOTO'S THYROIDITIS: Primary | ICD-10-CM

## 2018-02-26 ENCOUNTER — TELEPHONE (OUTPATIENT)
Dept: FAMILY MEDICINE CLINIC | Age: 58
End: 2018-02-26

## 2018-02-27 ENCOUNTER — TELEPHONE (OUTPATIENT)
Dept: FAMILY MEDICINE CLINIC | Age: 58
End: 2018-02-27

## 2018-02-27 NOTE — TELEPHONE ENCOUNTER
Okay.  Please do a prior off for the Ona Thyroid especially since the brand they are preferring is on back order.

## 2018-02-28 RX ORDER — LEVOTHYROXINE AND LIOTHYRONINE 38; 9 UG/1; UG/1
60 TABLET ORAL DAILY
Qty: 30 TABLET | Refills: 5 | Status: SHIPPED | OUTPATIENT
Start: 2018-02-28 | End: 2018-05-17 | Stop reason: SDUPTHER

## 2018-03-01 ENCOUNTER — OFFICE VISIT (OUTPATIENT)
Dept: FAMILY MEDICINE CLINIC | Age: 58
End: 2018-03-01
Payer: COMMERCIAL

## 2018-03-01 VITALS
SYSTOLIC BLOOD PRESSURE: 112 MMHG | DIASTOLIC BLOOD PRESSURE: 68 MMHG | HEART RATE: 78 BPM | TEMPERATURE: 99.1 F | BODY MASS INDEX: 18.06 KG/M2 | RESPIRATION RATE: 18 BRPM | WEIGHT: 112.4 LBS | HEIGHT: 66 IN

## 2018-03-01 DIAGNOSIS — M41.00 INFANTILE IDIOPATHIC SCOLIOSIS, UNSPECIFIED SPINAL REGION: ICD-10-CM

## 2018-03-01 DIAGNOSIS — G89.29 OTHER CHRONIC PAIN: Primary | ICD-10-CM

## 2018-03-01 DIAGNOSIS — G47.00 INSOMNIA, UNSPECIFIED TYPE: ICD-10-CM

## 2018-03-01 PROCEDURE — 99213 OFFICE O/P EST LOW 20 MIN: CPT | Performed by: FAMILY MEDICINE

## 2018-03-01 RX ORDER — ZOLPIDEM TARTRATE 10 MG/1
TABLET ORAL
Qty: 30 TABLET | Refills: 2 | Status: SHIPPED | OUTPATIENT
Start: 2018-03-01 | End: 2018-04-01

## 2018-03-01 RX ORDER — HYDROCODONE BITARTRATE AND ACETAMINOPHEN 10; 325 MG/1; MG/1
1 TABLET ORAL EVERY 6 HOURS PRN
Qty: 100 TABLET | Refills: 0 | Status: SHIPPED | OUTPATIENT
Start: 2018-03-01 | End: 2018-03-28 | Stop reason: SDUPTHER

## 2018-03-01 RX ORDER — HYDROCODONE BITARTRATE AND ACETAMINOPHEN 10; 325 MG/1; MG/1
1 TABLET ORAL EVERY 4 HOURS PRN
Refills: 0 | OUTPATIENT
Start: 2018-03-01

## 2018-03-01 RX ORDER — HYDROCODONE BITARTRATE AND ACETAMINOPHEN 10; 325 MG/1; MG/1
TABLET ORAL
Refills: 0 | COMMUNITY
Start: 2018-02-12 | End: 2018-05-08

## 2018-03-01 ASSESSMENT — ENCOUNTER SYMPTOMS
NAUSEA: 0
DIARRHEA: 0
COUGH: 0
EYES NEGATIVE: 1
SHORTNESS OF BREATH: 0
ABDOMINAL PAIN: 0
CONSTIPATION: 0

## 2018-03-01 NOTE — PROGRESS NOTES
unspecified spinal region  HYDROcodone-acetaminophen (NORCO)  MG per tablet     No orders of the defined types were placed in this encounter. Orders Placed This Encounter   Medications    zolpidem (AMBIEN) 10 MG tablet     Si PO QHS. Dispense:  30 tablet     Refill:  2    HYDROcodone-acetaminophen (NORCO)  MG per tablet     Sig: Take 1 tablet by mouth every 6 hours as needed for Pain for up to 7 days. Dispense:  100 tablet     Refill:  0     Medications Discontinued During This Encounter   Medication Reason    zolpidem (AMBIEN) 10 MG tablet Reorder     No Follow-up on file. Long talk. The importance of a good diet and exercise regimen discussed. Take medications as prescribed. Continue to follow up with surgeon  Follow up as instructed. Call if any problems        Controlled Substances Monitoring:            Basilio Henry MD          Please note this report has been partially produced using speech recognition software  And may cause contain errors related to that system including grammar, punctuation and spelling as well as words and phrases that may seem inappropriate. If there are questions or concerns please feel free to contact me to clarify.

## 2018-03-05 ENCOUNTER — OFFICE VISIT (OUTPATIENT)
Dept: BEHAVIORAL/MENTAL HEALTH CLINIC | Age: 58
End: 2018-03-05
Payer: COMMERCIAL

## 2018-03-05 DIAGNOSIS — F41.9 ANXIETY: Primary | ICD-10-CM

## 2018-03-05 DIAGNOSIS — F31.81 BIPOLAR 2 DISORDER (HCC): ICD-10-CM

## 2018-03-05 PROCEDURE — 90791 PSYCH DIAGNOSTIC EVALUATION: CPT | Performed by: PSYCHOLOGIST

## 2018-03-05 ASSESSMENT — PATIENT HEALTH QUESTIONNAIRE - PHQ9
1. LITTLE INTEREST OR PLEASURE IN DOING THINGS: 3
7. TROUBLE CONCENTRATING ON THINGS, SUCH AS READING THE NEWSPAPER OR WATCHING TELEVISION: 2
4. FEELING TIRED OR HAVING LITTLE ENERGY: 2
3. TROUBLE FALLING OR STAYING ASLEEP: 2
10. IF YOU CHECKED OFF ANY PROBLEMS, HOW DIFFICULT HAVE THESE PROBLEMS MADE IT FOR YOU TO DO YOUR WORK, TAKE CARE OF THINGS AT HOME, OR GET ALONG WITH OTHER PEOPLE: 3
5. POOR APPETITE OR OVEREATING: 2
9. THOUGHTS THAT YOU WOULD BE BETTER OFF DEAD, OR OF HURTING YOURSELF: 0
SUM OF ALL RESPONSES TO PHQ9 QUESTIONS 1 & 2: 5
2. FEELING DOWN, DEPRESSED OR HOPELESS: 2
6. FEELING BAD ABOUT YOURSELF - OR THAT YOU ARE A FAILURE OR HAVE LET YOURSELF OR YOUR FAMILY DOWN: 2
8. MOVING OR SPEAKING SO SLOWLY THAT OTHER PEOPLE COULD HAVE NOTICED. OR THE OPPOSITE, BEING SO FIGETY OR RESTLESS THAT YOU HAVE BEEN MOVING AROUND A LOT MORE THAN USUAL: 0
SUM OF ALL RESPONSES TO PHQ QUESTIONS 1-9: 15

## 2018-03-05 NOTE — PROGRESS NOTES
Behavioral Health Consultation  Nathan Hahn PsyD. Psychologist  3/5/18  9:29 AM      Time spent with Patient: 30 minutes  [facility]. Reason for Consult:  depression and anxiety  Referring Provider: Prosper Brown MD  [address] [place] [address] Drive [address] [facility], [address] [place] [address]    Pt provided informed consent for the behavioral health program. Discussed with patient model of service to include the limits of confidentiality (i.e. abuse reporting, suicide intervention, etc.) and short-term intervention focused approach. Pt indicated understanding. Feedback given to PCP. S:  Pt reports that she had fusion of her back 6 weeks ago. She states that her first surgery failed and had to be redone. She reports that she was experiencing halucinations in the hospital as well as right after she got home but that these went away after she stopped taking oxycontin. Pt reports that she goes through days where she doesn't want to talk to anyone and isolates herself. She reports that her mother has anxiety if she doesn't call her and it is sometimes difficult for her to reach out to her. Pt reports that she is afraid that she is going to break something if does a lot of activity. She reports that her  had to step up after the surgery and they have been getting into arguments. Additionally she reports that they have a dog who has heart problems. Pt states that she tends to  on others emotions reporting that her son is having his own stress, her sister is having health problems, and that she takes these stressors on as her own proble. She reports that she has been experiencing flashbacks to the hospital and has been experiencing anxiety attacks where her heart races, has SOB, and becomes restless.   She is having nightmares about being in the hospital.  She reports that she tries to take less pain medicine then she is prescribed and smokes \"a puff\" of marijuana nightly stating that this helps her to sleep and with pain. She reports that she has always had \"a little depression and anxiety\" and symptoms worsneed after her father's death in 2013. She denies any current SI but did state she was experiencing SI right after the hospitalization for her back surgery. She denies any history of suicide attempts and states that she would never hurt herself. She does report that she was hospitalized in [address] [facility] in the 1990's stating this was due to \"exhaustion\". She reports that she then received outpatient treatment from a psychiatrist and was treated with antidepressants and mood stabilizers. She reports that the psychiatrist diagnosed her with bipolar disorder. She was unsure she agreed with this diagnosis although did state that she has periods of time where she does have manic behavior. She reports that during these times she feels that she's on top of the world, has high energy, talks a lot, starts a lot of projects, and sleeps a lot less. She reports that symptoms last at most for 3-4 days. She reports that this happens a couple times a year and that she prefers these periods of time to when she feels depressed. She reports that when she is depressed she doesn't want to do anything and will sleep for 16 hours a day. She reports that her coping skills include sleeping or listening to music. She reports that she has tried yoga and deep breathing but cannot concentrate on this. She reports that she used to like to do yard work but cannot do this now due to her back problems. She denies any HI.           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 Her smoking use included Cigarettes. She started smoking about 33 years ago. She has a 3.75 pack-year smoking history. She has never used smokeless tobacco.  ETOH:   reports that she drinks alcohol. A:  Administered the PHQ-9, scores indicate moderately severe depression. Patient's report today indicates that she has experienced episodes of major depression as well as episodes of hypomania consistent with bipolar disorder 2. She is also reporting some symptoms related to panic disorder as well as PTSD although not enough symptoms to warrant a full diagnosis of either of these anxiety disorders. However she does meet criteria for unspecified anxiety. Pt would likely benefit from [facility] services to increase coping skills and provide symptom control and relief. PHQ Scores 3/5/2018 9/1/2017 6/7/2016   PHQ2 Score 5 0 0   PHQ9 Score 15 0 0     Interpretation of Total Score Depression Severity: 1-4 = Minimal depression, 5-9 = Mild depression, 10-14 = Moderate depression, 15-19 = Moderately severe depression, 20-27 = Severe depression      Diagnosis:    Bipolar II disorder; depressed episode and moderate1. Unspecified anxiety    Plan:  Pt interventions:  Established rapport, Conducted functional assessment, Perry-setting to identify pt's primary goals for [facility] visit / overall health, Supportive techniques, Emphasized self-care as important for managing overall health, Provided Psychoeducation re: treatment recommendations and Identified relevant behavioral strategies for targeting depression/anxiety including journaling. Pt Behavioral Change Plan:  1. Recommended patient start journaling as well as look into knitting or crocheting. 2.  Return in 2 weeks.     Please note this report has been partially produced using speech recognition software and may cause contain errors related to that system including grammar,

## 2018-03-06 ENCOUNTER — TELEPHONE (OUTPATIENT)
Dept: FAMILY MEDICINE CLINIC | Age: 58
End: 2018-03-06

## 2018-03-07 DIAGNOSIS — M04.1 FMF (FAMILIAL MEDITERRANEAN FEVER) (HCC): Primary | ICD-10-CM

## 2018-03-07 RX ORDER — COLCHICINE 0.6 MG/1
0.6 TABLET ORAL 2 TIMES DAILY
Qty: 90 TABLET | Refills: 1 | Status: SHIPPED | OUTPATIENT
Start: 2018-03-07 | End: 2018-06-10 | Stop reason: SDUPTHER

## 2018-03-07 NOTE — TELEPHONE ENCOUNTER
Pt needing her colchicine rx written by you, Dr. Sabiha Lind wrote the last one and she doesn't see him anymore.  Rx pending, then will proceed with PA

## 2018-03-28 DIAGNOSIS — G89.29 OTHER CHRONIC PAIN: ICD-10-CM

## 2018-03-28 DIAGNOSIS — M41.00 INFANTILE IDIOPATHIC SCOLIOSIS, UNSPECIFIED SPINAL REGION: ICD-10-CM

## 2018-03-28 RX ORDER — HYDROCODONE BITARTRATE AND ACETAMINOPHEN 10; 325 MG/1; MG/1
1 TABLET ORAL EVERY 6 HOURS PRN
Qty: 100 TABLET | Refills: 0 | Status: SHIPPED | OUTPATIENT
Start: 2018-03-28 | End: 2018-04-25 | Stop reason: SDUPTHER

## 2018-04-09 ENCOUNTER — TELEPHONE (OUTPATIENT)
Dept: FAMILY MEDICINE CLINIC | Age: 58
End: 2018-04-09

## 2018-04-09 DIAGNOSIS — E03.9 HYPOTHYROIDISM, UNSPECIFIED TYPE: ICD-10-CM

## 2018-04-09 DIAGNOSIS — M04.1 FAMILIAL MEDITERRANEAN FEVER (HCC): Primary | ICD-10-CM

## 2018-04-09 DIAGNOSIS — E06.3 HASHIMOTO'S THYROIDITIS: ICD-10-CM

## 2018-04-09 DIAGNOSIS — R53.83 FATIGUE, UNSPECIFIED TYPE: ICD-10-CM

## 2018-04-09 DIAGNOSIS — G43.909 MIGRAINE WITHOUT STATUS MIGRAINOSUS, NOT INTRACTABLE, UNSPECIFIED MIGRAINE TYPE: ICD-10-CM

## 2018-04-10 PROBLEM — M41.9 KYPHOSCOLIOSIS: Status: ACTIVE | Noted: 2018-04-10

## 2018-04-12 DIAGNOSIS — E03.9 HYPOTHYROIDISM, UNSPECIFIED TYPE: ICD-10-CM

## 2018-04-12 DIAGNOSIS — E06.3 HASHIMOTO'S THYROIDITIS: ICD-10-CM

## 2018-04-12 DIAGNOSIS — R53.83 FATIGUE, UNSPECIFIED TYPE: ICD-10-CM

## 2018-04-12 LAB
BASOPHILS ABSOLUTE: 0.1 K/UL (ref 0–0.2)
BASOPHILS RELATIVE PERCENT: 0.9 %
EOSINOPHILS ABSOLUTE: 0.1 K/UL (ref 0–0.7)
EOSINOPHILS RELATIVE PERCENT: 1.9 %
HCT VFR BLD CALC: 43.1 % (ref 37–47)
HEMOGLOBIN: 14.1 G/DL (ref 12–16)
LYMPHOCYTES ABSOLUTE: 1.6 K/UL (ref 1–4.8)
LYMPHOCYTES RELATIVE PERCENT: 26.1 %
MCH RBC QN AUTO: 28.4 PG (ref 27–31.3)
MCHC RBC AUTO-ENTMCNC: 32.7 % (ref 33–37)
MCV RBC AUTO: 86.9 FL (ref 82–100)
MONOCYTES ABSOLUTE: 0.3 K/UL (ref 0.2–0.8)
MONOCYTES RELATIVE PERCENT: 5.5 %
NEUTROPHILS ABSOLUTE: 3.9 K/UL (ref 1.4–6.5)
NEUTROPHILS RELATIVE PERCENT: 65.6 %
PDW BLD-RTO: 14.9 % (ref 11.5–14.5)
PLATELET # BLD: 258 K/UL (ref 130–400)
RBC # BLD: 4.96 M/UL (ref 4.2–5.4)
T4 FREE: 0.94 NG/DL (ref 0.93–1.7)
TSH SERPL DL<=0.05 MIU/L-ACNC: 1.3 UIU/ML (ref 0.27–4.2)
WBC # BLD: 5.9 K/UL (ref 4.8–10.8)

## 2018-04-20 ENCOUNTER — TELEPHONE (OUTPATIENT)
Dept: FAMILY MEDICINE CLINIC | Age: 58
End: 2018-04-20

## 2018-04-24 ENCOUNTER — TELEPHONE (OUTPATIENT)
Dept: FAMILY MEDICINE CLINIC | Age: 58
End: 2018-04-24

## 2018-04-24 DIAGNOSIS — E03.9 HYPOTHYROIDISM, UNSPECIFIED TYPE: Primary | ICD-10-CM

## 2018-04-24 DIAGNOSIS — E27.40 ADRENAL INSUFFICIENCY (HCC): ICD-10-CM

## 2018-04-25 DIAGNOSIS — G89.29 OTHER CHRONIC PAIN: ICD-10-CM

## 2018-04-25 DIAGNOSIS — M41.00 INFANTILE IDIOPATHIC SCOLIOSIS, UNSPECIFIED SPINAL REGION: ICD-10-CM

## 2018-04-25 RX ORDER — HYDROCODONE BITARTRATE AND ACETAMINOPHEN 10; 325 MG/1; MG/1
1 TABLET ORAL EVERY 6 HOURS PRN
Qty: 100 TABLET | Refills: 0 | Status: SHIPPED | OUTPATIENT
Start: 2018-04-25 | End: 2018-05-25

## 2018-04-30 ENCOUNTER — TELEPHONE (OUTPATIENT)
Dept: FAMILY MEDICINE CLINIC | Age: 58
End: 2018-04-30

## 2018-04-30 RX ORDER — FLUOXETINE HYDROCHLORIDE 20 MG/1
20 CAPSULE ORAL DAILY
Qty: 30 CAPSULE | Refills: 3 | Status: SHIPPED | OUTPATIENT
Start: 2018-04-30 | End: 2018-05-08

## 2018-05-02 ENCOUNTER — OFFICE VISIT (OUTPATIENT)
Dept: FAMILY MEDICINE CLINIC | Age: 58
End: 2018-05-02
Payer: COMMERCIAL

## 2018-05-02 VITALS
OXYGEN SATURATION: 98 % | BODY MASS INDEX: 17.73 KG/M2 | TEMPERATURE: 97.4 F | HEIGHT: 66 IN | WEIGHT: 110.31 LBS | SYSTOLIC BLOOD PRESSURE: 124 MMHG | RESPIRATION RATE: 12 BRPM | DIASTOLIC BLOOD PRESSURE: 86 MMHG | HEART RATE: 77 BPM

## 2018-05-02 DIAGNOSIS — F41.9 ANXIETY: ICD-10-CM

## 2018-05-02 DIAGNOSIS — M41.9 KYPHOSCOLIOSIS: ICD-10-CM

## 2018-05-02 DIAGNOSIS — R53.81 MALAISE AND FATIGUE: Primary | ICD-10-CM

## 2018-05-02 DIAGNOSIS — R53.83 MALAISE AND FATIGUE: Primary | ICD-10-CM

## 2018-05-02 DIAGNOSIS — R53.81 MALAISE AND FATIGUE: ICD-10-CM

## 2018-05-02 DIAGNOSIS — R53.83 MALAISE AND FATIGUE: ICD-10-CM

## 2018-05-02 DIAGNOSIS — Z92.89 HISTORY OF BLOOD TRANSFUSION: ICD-10-CM

## 2018-05-02 DIAGNOSIS — M04.1 FMF (FAMILIAL MEDITERRANEAN FEVER) (HCC): ICD-10-CM

## 2018-05-02 LAB
ALBUMIN SERPL-MCNC: 4.7 G/DL (ref 3.9–4.9)
ALP BLD-CCNC: 116 U/L (ref 40–130)
ALT SERPL-CCNC: 20 U/L (ref 0–33)
ANION GAP SERPL CALCULATED.3IONS-SCNC: 16 MEQ/L (ref 7–13)
AST SERPL-CCNC: 18 U/L (ref 0–35)
BASOPHILS ABSOLUTE: 0 K/UL (ref 0–0.2)
BASOPHILS RELATIVE PERCENT: 0.6 %
BILIRUB SERPL-MCNC: 0.3 MG/DL (ref 0–1.2)
BUN BLDV-MCNC: 19 MG/DL (ref 6–20)
CALCIUM SERPL-MCNC: 9.1 MG/DL (ref 8.6–10.2)
CHLORIDE BLD-SCNC: 101 MEQ/L (ref 98–107)
CO2: 26 MEQ/L (ref 22–29)
CREAT SERPL-MCNC: 0.59 MG/DL (ref 0.5–0.9)
EOSINOPHILS ABSOLUTE: 0.1 K/UL (ref 0–0.7)
EOSINOPHILS RELATIVE PERCENT: 1.4 %
FOLATE: >20 NG/ML (ref 7.3–26.1)
GFR AFRICAN AMERICAN: >60
GFR NON-AFRICAN AMERICAN: >60
GLOBULIN: 2.4 G/DL (ref 2.3–3.5)
GLUCOSE BLD-MCNC: 79 MG/DL (ref 74–109)
HBV SURFACE AB TITR SER: REACTIVE MIU/ML
HCT VFR BLD CALC: 42.4 % (ref 37–47)
HEMOGLOBIN: 13.8 G/DL (ref 12–16)
HEPATITIS B SURFACE ANTIGEN INTERPRETATION: NORMAL
HEPATITIS C ANTIBODY INTERPRETATION: NORMAL
LYMPHOCYTES ABSOLUTE: 1.7 K/UL (ref 1–4.8)
LYMPHOCYTES RELATIVE PERCENT: 26.6 %
MCH RBC QN AUTO: 28 PG (ref 27–31.3)
MCHC RBC AUTO-ENTMCNC: 32.6 % (ref 33–37)
MCV RBC AUTO: 86 FL (ref 82–100)
MONOCYTES ABSOLUTE: 0.3 K/UL (ref 0.2–0.8)
MONOCYTES RELATIVE PERCENT: 5.1 %
NEUTROPHILS ABSOLUTE: 4.2 K/UL (ref 1.4–6.5)
NEUTROPHILS RELATIVE PERCENT: 66.3 %
PDW BLD-RTO: 15 % (ref 11.5–14.5)
PLATELET # BLD: 221 K/UL (ref 130–400)
POTASSIUM SERPL-SCNC: 4.4 MEQ/L (ref 3.5–5.1)
RBC # BLD: 4.92 M/UL (ref 4.2–5.4)
SODIUM BLD-SCNC: 143 MEQ/L (ref 132–144)
TOTAL PROTEIN: 7.1 G/DL (ref 6.4–8.1)
VITAMIN B-12: 541 PG/ML (ref 232–1245)
VITAMIN D 25-HYDROXY: 41.1 NG/ML (ref 30–100)
WBC # BLD: 6.3 K/UL (ref 4.8–10.8)

## 2018-05-02 PROCEDURE — 99214 OFFICE O/P EST MOD 30 MIN: CPT | Performed by: FAMILY MEDICINE

## 2018-05-05 LAB — HIV-1 AND HIV-2 ANTIBODIES: NEGATIVE

## 2018-05-06 ENCOUNTER — HOSPITAL ENCOUNTER (EMERGENCY)
Age: 58
Discharge: HOME | End: 2018-05-06
Payer: COMMERCIAL

## 2018-05-06 VITALS
HEIGHT: 67 IN | SYSTOLIC BLOOD PRESSURE: 139 MMHG | DIASTOLIC BLOOD PRESSURE: 81 MMHG | TEMPERATURE: 97.8 F | RESPIRATION RATE: 20 BRPM | WEIGHT: 108 LBS | OXYGEN SATURATION: 95 % | BODY MASS INDEX: 16.95 KG/M2 | HEART RATE: 74 BPM

## 2018-05-06 DIAGNOSIS — F41.8 DEPRESSION WITH ANXIETY: Primary | ICD-10-CM

## 2018-05-06 LAB
ALBUMIN SERPL-MCNC: 4.7 G/DL (ref 3.9–4.9)
ALP BLD-CCNC: 109 U/L (ref 40–130)
ALT SERPL-CCNC: 19 U/L (ref 0–33)
AMPHETAMINE SCREEN, URINE: ABNORMAL
ANION GAP SERPL CALCULATED.3IONS-SCNC: 15 MEQ/L (ref 7–13)
AST SERPL-CCNC: 20 U/L (ref 0–35)
BACTERIA: ABNORMAL /HPF
BARBITURATE SCREEN URINE: ABNORMAL
BASOPHILS ABSOLUTE: 0 K/UL (ref 0–0.2)
BASOPHILS RELATIVE PERCENT: 0.5 %
BENZODIAZEPINE SCREEN, URINE: ABNORMAL
BILIRUB SERPL-MCNC: 0.3 MG/DL (ref 0–1.2)
BILIRUBIN URINE: NEGATIVE
BLOOD, URINE: ABNORMAL
BUN BLDV-MCNC: 21 MG/DL (ref 6–20)
CALCIUM SERPL-MCNC: 9.7 MG/DL (ref 8.6–10.2)
CANNABINOID SCREEN URINE: POSITIVE
CHLORIDE BLD-SCNC: 100 MEQ/L (ref 98–107)
CLARITY: CLEAR
CO2: 27 MEQ/L (ref 22–29)
COCAINE METABOLITE SCREEN URINE: ABNORMAL
COLOR: YELLOW
CREAT SERPL-MCNC: 0.6 MG/DL (ref 0.5–0.9)
EOSINOPHILS ABSOLUTE: 0 K/UL (ref 0–0.7)
EOSINOPHILS RELATIVE PERCENT: 0.6 %
EPITHELIAL CELLS, UA: ABNORMAL /HPF
ETHANOL PERCENT: NORMAL G/DL
ETHANOL: <10 MG/DL (ref 0–0.08)
GFR AFRICAN AMERICAN: >60
GFR NON-AFRICAN AMERICAN: >60
GLOBULIN: 2.4 G/DL (ref 2.3–3.5)
GLUCOSE BLD-MCNC: 110 MG/DL (ref 74–109)
GLUCOSE URINE: NEGATIVE MG/DL
HCG(URINE) PREGNANCY TEST: NEGATIVE
HCT VFR BLD CALC: 42.4 % (ref 37–47)
HEMOGLOBIN: 13.8 G/DL (ref 12–16)
KETONES, URINE: ABNORMAL MG/DL
LEUKOCYTE ESTERASE, URINE: NEGATIVE
LYMPHOCYTES ABSOLUTE: 1.2 K/UL (ref 1–4.8)
LYMPHOCYTES RELATIVE PERCENT: 17.5 %
Lab: ABNORMAL
MCH RBC QN AUTO: 27.9 PG (ref 27–31.3)
MCHC RBC AUTO-ENTMCNC: 32.4 % (ref 33–37)
MCV RBC AUTO: 85.9 FL (ref 82–100)
MONOCYTES ABSOLUTE: 0.3 K/UL (ref 0.2–0.8)
MONOCYTES RELATIVE PERCENT: 4.7 %
MUCUS: PRESENT
NEUTROPHILS ABSOLUTE: 5.3 K/UL (ref 1.4–6.5)
NEUTROPHILS RELATIVE PERCENT: 76.7 %
NITRITE, URINE: NEGATIVE
OPIATE SCREEN URINE: POSITIVE
PDW BLD-RTO: 15 % (ref 11.5–14.5)
PH UA: 5.5 (ref 5–9)
PHENCYCLIDINE SCREEN URINE: ABNORMAL
PLATELET # BLD: 208 K/UL (ref 130–400)
POTASSIUM SERPL-SCNC: 3.9 MEQ/L (ref 3.5–5.1)
PROTEIN UA: NEGATIVE MG/DL
RBC # BLD: 4.94 M/UL (ref 4.2–5.4)
RBC UA: ABNORMAL /HPF (ref 0–2)
SODIUM BLD-SCNC: 142 MEQ/L (ref 132–144)
SPECIFIC GRAVITY UA: 1.02 (ref 1–1.03)
TOTAL CK: 140 U/L (ref 0–170)
TOTAL PROTEIN: 7.1 G/DL (ref 6.4–8.1)
TSH SERPL DL<=0.05 MIU/L-ACNC: 0.38 UIU/ML (ref 0.27–4.2)
URINE REFLEX TO CULTURE: YES
UROBILINOGEN, URINE: 0.2 E.U./DL
WBC # BLD: 7 K/UL (ref 4.8–10.8)
WBC UA: ABNORMAL /HPF (ref 0–5)

## 2018-05-06 PROCEDURE — 99285 EMERGENCY DEPT VISIT HI MDM: CPT

## 2018-05-06 PROCEDURE — 36415 COLL VENOUS BLD VENIPUNCTURE: CPT

## 2018-05-06 PROCEDURE — 81001 URINALYSIS AUTO W/SCOPE: CPT

## 2018-05-06 PROCEDURE — 84443 ASSAY THYROID STIM HORMONE: CPT

## 2018-05-06 PROCEDURE — G0480 DRUG TEST DEF 1-7 CLASSES: HCPCS

## 2018-05-06 PROCEDURE — 80053 COMPREHEN METABOLIC PANEL: CPT

## 2018-05-06 PROCEDURE — 6360000002 HC RX W HCPCS: Performed by: FAMILY MEDICINE

## 2018-05-06 PROCEDURE — 87086 URINE CULTURE/COLONY COUNT: CPT

## 2018-05-06 PROCEDURE — 80307 DRUG TEST PRSMV CHEM ANLYZR: CPT

## 2018-05-06 PROCEDURE — 82550 ASSAY OF CK (CPK): CPT

## 2018-05-06 PROCEDURE — 84703 CHORIONIC GONADOTROPIN ASSAY: CPT

## 2018-05-06 PROCEDURE — 6370000000 HC RX 637 (ALT 250 FOR IP): Performed by: PHYSICIAN ASSISTANT

## 2018-05-06 PROCEDURE — 85025 COMPLETE CBC W/AUTO DIFF WBC: CPT

## 2018-05-06 RX ORDER — LORAZEPAM 1 MG/1
2 TABLET ORAL ONCE
Status: COMPLETED | OUTPATIENT
Start: 2018-05-06 | End: 2018-05-06

## 2018-05-06 RX ORDER — LORAZEPAM 0.5 MG/1
0.5 TABLET ORAL EVERY 12 HOURS PRN
Qty: 6 TABLET | Refills: 0 | Status: SHIPPED | OUTPATIENT
Start: 2018-05-06 | End: 2018-05-12

## 2018-05-06 RX ORDER — ONDANSETRON 4 MG/1
4 TABLET, ORALLY DISINTEGRATING ORAL ONCE
Status: COMPLETED | OUTPATIENT
Start: 2018-05-06 | End: 2018-05-06

## 2018-05-06 RX ADMIN — LORAZEPAM 2 MG: 1 TABLET ORAL at 16:41

## 2018-05-06 RX ADMIN — ONDANSETRON 4 MG: 4 TABLET, ORALLY DISINTEGRATING ORAL at 13:20

## 2018-05-06 ASSESSMENT — PAIN DESCRIPTION - LOCATION: LOCATION: OTHER (COMMENT)

## 2018-05-06 ASSESSMENT — PAIN DESCRIPTION - PROGRESSION: CLINICAL_PROGRESSION: NOT CHANGED

## 2018-05-06 ASSESSMENT — ENCOUNTER SYMPTOMS
ALLERGIC/IMMUNOLOGIC NEGATIVE: 1
APNEA: 0
TROUBLE SWALLOWING: 0
EYE PAIN: 0
ABDOMINAL PAIN: 0
SHORTNESS OF BREATH: 0
COLOR CHANGE: 0

## 2018-05-06 ASSESSMENT — PAIN DESCRIPTION - FREQUENCY: FREQUENCY: INTERMITTENT

## 2018-05-06 ASSESSMENT — PAIN SCALES - GENERAL: PAINLEVEL_OUTOF10: 8

## 2018-05-06 ASSESSMENT — PAIN DESCRIPTION - ONSET: ONSET: ON-GOING

## 2018-05-06 ASSESSMENT — PATIENT HEALTH QUESTIONNAIRE - PHQ9: SUM OF ALL RESPONSES TO PHQ QUESTIONS 1-9: 19

## 2018-05-06 ASSESSMENT — PAIN DESCRIPTION - PAIN TYPE: TYPE: ACUTE PAIN

## 2018-05-07 ENCOUNTER — TELEPHONE (OUTPATIENT)
Dept: FAMILY MEDICINE CLINIC | Age: 58
End: 2018-05-07

## 2018-05-07 RX ORDER — ROPINIROLE 0.25 MG/1
TABLET, FILM COATED ORAL
Qty: 60 TABLET | Refills: 1 | Status: SHIPPED | OUTPATIENT
Start: 2018-05-07 | End: 2018-06-01 | Stop reason: DRUGHIGH

## 2018-05-07 RX ORDER — ONDANSETRON 4 MG/1
4 TABLET, FILM COATED ORAL EVERY 12 HOURS PRN
Qty: 15 TABLET | Refills: 1 | Status: SHIPPED | OUTPATIENT
Start: 2018-05-07 | End: 2018-06-01 | Stop reason: DRUGHIGH

## 2018-05-08 ENCOUNTER — OFFICE VISIT (OUTPATIENT)
Dept: UROLOGY | Age: 58
End: 2018-05-08
Payer: COMMERCIAL

## 2018-05-08 VITALS
WEIGHT: 108 LBS | BODY MASS INDEX: 16.95 KG/M2 | SYSTOLIC BLOOD PRESSURE: 130 MMHG | HEIGHT: 67 IN | HEART RATE: 87 BPM | DIASTOLIC BLOOD PRESSURE: 80 MMHG

## 2018-05-08 DIAGNOSIS — N35.9 URETHRAL STRICTURE, UNSPECIFIED STRICTURE TYPE: Primary | ICD-10-CM

## 2018-05-08 DIAGNOSIS — R33.9 URINARY RETENTION: ICD-10-CM

## 2018-05-08 LAB
BILIRUBIN, POC: ABNORMAL
BLOOD URINE, POC: ABNORMAL
CLARITY, POC: CLEAR
COLOR, POC: YELLOW
GLUCOSE URINE, POC: ABNORMAL
KETONES, POC: ABNORMAL
LEUKOCYTE EST, POC: ABNORMAL
NITRITE, POC: ABNORMAL
PH, POC: 5
POST VOID RESIDUAL (PVR): 29 ML
PROTEIN, POC: ABNORMAL
SPECIFIC GRAVITY, POC: >=1.03
URINE CULTURE, ROUTINE: NORMAL
UROBILINOGEN, POC: 1

## 2018-05-08 PROCEDURE — 51798 US URINE CAPACITY MEASURE: CPT | Performed by: UROLOGY

## 2018-05-08 PROCEDURE — 99203 OFFICE O/P NEW LOW 30 MIN: CPT | Performed by: UROLOGY

## 2018-05-08 PROCEDURE — 81003 URINALYSIS AUTO W/O SCOPE: CPT | Performed by: UROLOGY

## 2018-05-08 RX ORDER — METHOCARBAMOL 750 MG/1
TABLET, FILM COATED ORAL
Refills: 2 | COMMUNITY
Start: 2018-05-03

## 2018-05-10 ENCOUNTER — OFFICE VISIT (OUTPATIENT)
Dept: FAMILY MEDICINE CLINIC | Age: 58
End: 2018-05-10
Payer: COMMERCIAL

## 2018-05-10 VITALS
BODY MASS INDEX: 17.17 KG/M2 | RESPIRATION RATE: 16 BRPM | SYSTOLIC BLOOD PRESSURE: 92 MMHG | HEART RATE: 72 BPM | TEMPERATURE: 99.2 F | HEIGHT: 67 IN | WEIGHT: 109.4 LBS | DIASTOLIC BLOOD PRESSURE: 62 MMHG

## 2018-05-10 DIAGNOSIS — F31.81 BIPOLAR 2 DISORDER (HCC): ICD-10-CM

## 2018-05-10 DIAGNOSIS — R11.0 NAUSEA: ICD-10-CM

## 2018-05-10 DIAGNOSIS — F11.93 WITHDRAWAL FROM OPIOIDS (HCC): ICD-10-CM

## 2018-05-10 DIAGNOSIS — G25.81 RLS (RESTLESS LEGS SYNDROME): ICD-10-CM

## 2018-05-10 DIAGNOSIS — R00.2 PALPITATIONS: ICD-10-CM

## 2018-05-10 DIAGNOSIS — F41.9 ANXIETY: Primary | ICD-10-CM

## 2018-05-10 DIAGNOSIS — G89.29 OTHER CHRONIC PAIN: ICD-10-CM

## 2018-05-10 PROCEDURE — 99213 OFFICE O/P EST LOW 20 MIN: CPT | Performed by: FAMILY MEDICINE

## 2018-05-10 RX ORDER — LORAZEPAM 0.5 MG/1
0.5 TABLET ORAL EVERY 8 HOURS PRN
Qty: 30 TABLET | Refills: 1 | Status: SHIPPED | OUTPATIENT
Start: 2018-05-10 | End: 2018-06-09

## 2018-05-10 RX ORDER — ROPINIROLE 1 MG/1
1 TABLET, FILM COATED ORAL NIGHTLY
Qty: 30 TABLET | Refills: 2 | Status: SHIPPED | OUTPATIENT
Start: 2018-05-10 | End: 2018-07-27 | Stop reason: SDUPTHER

## 2018-05-10 RX ORDER — ONDANSETRON HYDROCHLORIDE 8 MG/1
8 TABLET, FILM COATED ORAL EVERY 8 HOURS PRN
Qty: 24 TABLET | Refills: 1 | Status: SHIPPED | OUTPATIENT
Start: 2018-05-10 | End: 2018-07-22 | Stop reason: SDUPTHER

## 2018-05-10 ASSESSMENT — ENCOUNTER SYMPTOMS
ABDOMINAL PAIN: 0
NAUSEA: 1
DIARRHEA: 1
EYES NEGATIVE: 1
SHORTNESS OF BREATH: 0
CONSTIPATION: 0
COUGH: 0

## 2018-05-13 DIAGNOSIS — G47.00 INSOMNIA, UNSPECIFIED TYPE: Primary | ICD-10-CM

## 2018-05-14 RX ORDER — ZOLPIDEM TARTRATE 10 MG/1
TABLET ORAL
Qty: 30 TABLET | Refills: 2 | OUTPATIENT
Start: 2018-05-14 | End: 2018-06-13

## 2018-05-17 ENCOUNTER — OFFICE VISIT (OUTPATIENT)
Dept: ENDOCRINOLOGY | Age: 58
End: 2018-05-17
Payer: COMMERCIAL

## 2018-05-17 VITALS
HEIGHT: 67 IN | WEIGHT: 109 LBS | BODY MASS INDEX: 17.11 KG/M2 | SYSTOLIC BLOOD PRESSURE: 131 MMHG | HEART RATE: 77 BPM | DIASTOLIC BLOOD PRESSURE: 77 MMHG

## 2018-05-17 DIAGNOSIS — E46 MALNUTRITION, UNSPECIFIED TYPE (HCC): ICD-10-CM

## 2018-05-17 DIAGNOSIS — E27.40 ADRENAL INSUFFICIENCY (HCC): Primary | ICD-10-CM

## 2018-05-17 DIAGNOSIS — E03.9 HYPOTHYROIDISM, UNSPECIFIED TYPE: ICD-10-CM

## 2018-05-17 PROCEDURE — 99203 OFFICE O/P NEW LOW 30 MIN: CPT | Performed by: INTERNAL MEDICINE

## 2018-05-21 RX ORDER — LEVOTHYROXINE AND LIOTHYRONINE 38; 9 UG/1; UG/1
60 TABLET ORAL DAILY
Qty: 15 TABLET | Refills: 3
Start: 2018-05-21 | End: 2018-07-17

## 2018-05-21 ASSESSMENT — ENCOUNTER SYMPTOMS: BACK PAIN: 1

## 2018-06-01 ENCOUNTER — OFFICE VISIT (OUTPATIENT)
Dept: FAMILY MEDICINE CLINIC | Age: 58
End: 2018-06-01
Payer: COMMERCIAL

## 2018-06-01 VITALS
BODY MASS INDEX: 17.58 KG/M2 | DIASTOLIC BLOOD PRESSURE: 78 MMHG | WEIGHT: 112 LBS | SYSTOLIC BLOOD PRESSURE: 116 MMHG | HEIGHT: 67 IN | TEMPERATURE: 97.7 F | RESPIRATION RATE: 12 BRPM | HEART RATE: 71 BPM

## 2018-06-01 DIAGNOSIS — G89.29 OTHER CHRONIC PAIN: Primary | ICD-10-CM

## 2018-06-01 DIAGNOSIS — M41.00 INFANTILE IDIOPATHIC SCOLIOSIS, UNSPECIFIED SPINAL REGION: ICD-10-CM

## 2018-06-01 DIAGNOSIS — G47.00 INSOMNIA, UNSPECIFIED TYPE: ICD-10-CM

## 2018-06-01 DIAGNOSIS — F41.9 ANXIETY: ICD-10-CM

## 2018-06-01 PROCEDURE — 99213 OFFICE O/P EST LOW 20 MIN: CPT | Performed by: FAMILY MEDICINE

## 2018-06-01 RX ORDER — ZOLPIDEM TARTRATE 10 MG/1
TABLET ORAL
Qty: 30 TABLET | Refills: 2 | Status: CANCELLED | OUTPATIENT
Start: 2018-06-01 | End: 2018-07-02

## 2018-06-01 RX ORDER — HYDROCODONE BITARTRATE AND IBUPROFEN 7.5; 2 MG/1; MG/1
TABLET, FILM COATED ORAL
Qty: 100 TABLET | Refills: 0 | Status: SHIPPED | OUTPATIENT
Start: 2018-06-01 | End: 2018-07-02 | Stop reason: SDUPTHER

## 2018-06-01 RX ORDER — HYDROCODONE BITARTRATE AND ACETAMINOPHEN 10; 325 MG/1; MG/1
1 TABLET ORAL EVERY 6 HOURS PRN
Qty: 100 TABLET | Refills: 0 | Status: CANCELLED | OUTPATIENT
Start: 2018-06-01 | End: 2018-07-01

## 2018-06-01 ASSESSMENT — ENCOUNTER SYMPTOMS
DIARRHEA: 0
COUGH: 0
NAUSEA: 0
EYES NEGATIVE: 1
ABDOMINAL PAIN: 0
CONSTIPATION: 0
SHORTNESS OF BREATH: 0

## 2018-06-10 DIAGNOSIS — M04.1 FMF (FAMILIAL MEDITERRANEAN FEVER) (HCC): ICD-10-CM

## 2018-06-11 RX ORDER — COLCHICINE 0.6 MG/1
TABLET ORAL
Qty: 90 TABLET | Refills: 1 | Status: SHIPPED | OUTPATIENT
Start: 2018-06-11 | End: 2018-09-03 | Stop reason: SDUPTHER

## 2018-07-02 DIAGNOSIS — G89.29 OTHER CHRONIC PAIN: ICD-10-CM

## 2018-07-02 RX ORDER — HYDROCODONE BITARTRATE AND IBUPROFEN 7.5; 2 MG/1; MG/1
TABLET, FILM COATED ORAL
Qty: 100 TABLET | Refills: 0 | Status: SHIPPED | OUTPATIENT
Start: 2018-07-02 | End: 2018-07-31 | Stop reason: SDUPTHER

## 2018-07-22 DIAGNOSIS — R11.0 NAUSEA: ICD-10-CM

## 2018-07-23 RX ORDER — ONDANSETRON HYDROCHLORIDE 8 MG/1
8 TABLET, FILM COATED ORAL EVERY 8 HOURS PRN
Qty: 24 TABLET | Refills: 3 | Status: SHIPPED | OUTPATIENT
Start: 2018-07-23

## 2018-07-23 NOTE — TELEPHONE ENCOUNTER
Medication:   Requested Prescriptions     Pending Prescriptions Disp Refills    ondansetron (ZOFRAN) 8 MG tablet [Pharmacy Med Name: ONDANSETRON 8MG TABLETS] 24 tablet 0     Sig: TAKE 1 TABLET BY MOUTH EVERY 8 HOURS AS NEEDED FOR NAUSEA OR VOMITING         Last Office Visit: 6-1-18    Last Labs: 5-18    Last Filled: 5-10-18

## 2018-07-27 RX ORDER — ROPINIROLE 1 MG/1
TABLET, FILM COATED ORAL
Qty: 30 TABLET | Refills: 3 | Status: SHIPPED | OUTPATIENT
Start: 2018-07-27 | End: 2018-11-07 | Stop reason: SDUPTHER

## 2018-07-31 DIAGNOSIS — G89.29 OTHER CHRONIC PAIN: ICD-10-CM

## 2018-07-31 RX ORDER — HYDROCODONE BITARTRATE AND IBUPROFEN 7.5; 2 MG/1; MG/1
TABLET, FILM COATED ORAL
Qty: 100 TABLET | Refills: 0 | Status: SHIPPED | OUTPATIENT
Start: 2018-07-31 | End: 2018-08-30 | Stop reason: SDUPTHER

## 2018-08-15 DIAGNOSIS — G47.00 INSOMNIA, UNSPECIFIED TYPE: Primary | ICD-10-CM

## 2018-08-15 RX ORDER — ZOLPIDEM TARTRATE 10 MG/1
TABLET ORAL
Qty: 30 TABLET | Refills: 0 | OUTPATIENT
Start: 2018-08-15 | End: 2018-09-06 | Stop reason: SDUPTHER

## 2018-08-30 DIAGNOSIS — G89.29 OTHER CHRONIC PAIN: ICD-10-CM

## 2018-08-30 RX ORDER — HYDROCODONE BITARTRATE AND IBUPROFEN 7.5; 2 MG/1; MG/1
TABLET, FILM COATED ORAL
Qty: 100 TABLET | Refills: 0 | Status: SHIPPED | OUTPATIENT
Start: 2018-08-30 | End: 2018-10-04 | Stop reason: SDUPTHER

## 2018-09-03 DIAGNOSIS — M04.1 FMF (FAMILIAL MEDITERRANEAN FEVER) (HCC): ICD-10-CM

## 2018-09-04 RX ORDER — COLCHICINE 0.6 MG/1
TABLET ORAL
Qty: 90 TABLET | Refills: 1 | Status: SHIPPED | OUTPATIENT
Start: 2018-09-04

## 2018-09-06 ENCOUNTER — OFFICE VISIT (OUTPATIENT)
Dept: FAMILY MEDICINE CLINIC | Age: 58
End: 2018-09-06
Payer: COMMERCIAL

## 2018-09-06 VITALS
DIASTOLIC BLOOD PRESSURE: 68 MMHG | HEART RATE: 68 BPM | RESPIRATION RATE: 18 BRPM | TEMPERATURE: 97.7 F | WEIGHT: 113.7 LBS | BODY MASS INDEX: 17.23 KG/M2 | HEIGHT: 68 IN | SYSTOLIC BLOOD PRESSURE: 108 MMHG

## 2018-09-06 DIAGNOSIS — G89.29 OTHER CHRONIC PAIN: ICD-10-CM

## 2018-09-06 DIAGNOSIS — M04.1 FMF (FAMILIAL MEDITERRANEAN FEVER) (HCC): ICD-10-CM

## 2018-09-06 DIAGNOSIS — M41.9 KYPHOSCOLIOSIS: Primary | ICD-10-CM

## 2018-09-06 DIAGNOSIS — K40.91 UNILATERAL RECURRENT INGUINAL HERNIA WITHOUT OBSTRUCTION OR GANGRENE: ICD-10-CM

## 2018-09-06 DIAGNOSIS — G47.00 INSOMNIA, UNSPECIFIED TYPE: ICD-10-CM

## 2018-09-06 PROCEDURE — 99213 OFFICE O/P EST LOW 20 MIN: CPT | Performed by: FAMILY MEDICINE

## 2018-09-06 RX ORDER — DRONABINOL 5 MG/1
5 CAPSULE ORAL
Qty: 60 CAPSULE | Refills: 1 | Status: SHIPPED | OUTPATIENT
Start: 2018-09-06 | End: 2018-10-06

## 2018-09-06 RX ORDER — ZOLPIDEM TARTRATE 10 MG/1
TABLET ORAL
Qty: 30 TABLET | Refills: 2 | Status: SHIPPED | OUTPATIENT
Start: 2018-09-06 | End: 2018-12-06 | Stop reason: SDUPTHER

## 2018-09-06 ASSESSMENT — ENCOUNTER SYMPTOMS
SHORTNESS OF BREATH: 0
SINUS PRESSURE: 0
EYE ITCHING: 0
EYE DISCHARGE: 0
CONSTIPATION: 0
DIARRHEA: 0
BACK PAIN: 1
SORE THROAT: 0
CHEST TIGHTNESS: 0
ABDOMINAL PAIN: 0

## 2018-09-06 NOTE — PROGRESS NOTES
Subjective  Clotilde Dawn, 63 y.o. female presents today with:  Chief Complaint   Patient presents with    Back Pain         Insomnia         Hernia                HPI  Pt is currently on Vicoprofen without issue or concern for back pain. Pt had spinal fusion 2017 and 2018. Pt currently is on Ambien for sleep issue. States medication works well and only uses as needed. Pt would like to discuss pain in rt side groin area x 4 months. Pt wonders if it is a possible hernia. Hurts when she sneezes, coughs or speaks too loud. Taking current medications which were reviewed. Problem list discussed. Eating better. Has gained a few pounds    Overall doing well. Has no other new problem / question. Health Maintenance Is Up-To-Date         No other questions and or concerns for today's visit      Review of Systems   Constitutional: Negative for appetite change, fatigue and fever. HENT: Negative for congestion, ear pain, sinus pressure and sore throat. Eyes: Negative for discharge and itching. Respiratory: Negative for chest tightness and shortness of breath. Cardiovascular: Negative for chest pain and palpitations. Gastrointestinal: Negative for abdominal pain, constipation and diarrhea. Endocrine: Negative for polydipsia. Genitourinary: Negative for difficulty urinating. Musculoskeletal: Positive for back pain and myalgias. Negative for gait problem. Rt inguinal area pain   Skin: Negative. Neurological: Negative for dizziness. Hematological: Negative. Psychiatric/Behavioral: Negative. Negative for sleep disturbance. The patient is not nervous/anxious.           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    RLS (restless legs syndrome)     Scoliosis     Ureteral stricture      Past Surgical History:   Procedure Laterality Date    BACK SURGERY      fusion, 2017, 2018    BLADDER SURGERY      BONE RESECTION, RIB Bilateral     thoracic, 3 inches    BREAST ENHANCEMENT SURGERY      CATARACT REMOVAL WITH IMPLANT  04/23/15    DR [address]  LEFT EYE    CATARACT REMOVAL WITH IMPLANT  04/30/15    DR GARCIA  RT EYE    COLONOSCOPY  11/01/2017    DR VELOZ    COSMETIC SURGERY      CYSTOSCOPY  06/05/14    DR BYRD    HYSTERECTOMY, TOTAL ABDOMINAL  2001    HYSTERECTOMY, TOTAL ABDOMINAL      LUMBAR FUSION  12/26/2017    DR Monse Myers    LUMBAR FUSION  01/25/2018    DR Monse Myers, REVISION POSTEROLATERAL SPINAL FUSION    OTHER SURGICAL HISTORY  12/28/2017    DR Monse Myers,  SEVERAL DIFFERENT THINGS    UPPER GASTROINTESTINAL ENDOSCOPY  12/02/14    DR Campbell Brown    UPPER GASTROINTESTINAL ENDOSCOPY  11/01/2017    DR Campbell Brown     Social History     Social History    Marital status:      Spouse name: Danny Chavez     Number of children: 1    Years of education: N/A     Occupational History     Other     Social History Main Topics    Smoking status: Former Smoker     Packs/day: 0.25     Years: 15.00     Types: Cigarettes     Start date: 1/1/1985     Quit date: 1/1/2000    Smokeless tobacco: Never Used    Alcohol use No    Drug use: No    Sexual activity: No     Other Topics Concern    Not on file     Social History Narrative    ** Merged History Encounter **          Family History   Problem Relation Age of Onset    Diabetes Mother     Glaucoma Mother      Allergies   Allergen Reactions    Dominic-Allergin [Diphenhydramine]     Levofloxacin Hives     JOINT SWELLING     Morphine Itching    Oxycontin [Oxycodone] Other (See Comments)     See things while on this medications.     Pcn [Penicillins] Hives     JOINT SWELLING     Penicillins Hives and Swelling    Pentazocine Lactate     Ultram [Tramadol Hcl] Itching    Cephalexin Rash     Current Outpatient Prescriptions   Medication Sig Dispense Refill    dronabinol (MARINOL) 5 MG capsule Take 1 capsule by mouth 2 times daily (before meals) for 30 days. . 60 capsule 1    zolpidem (AMBIEN) 10 MG tablet TAKE 1 TABLET BY MOUTH EVERY DAY AT BEDTIME AS NEEDED. 30 tablet 2    colchicine (COLCRYS) 0.6 MG tablet TAKE 1 TABLET BY MOUTH TWICE DAILY 90 tablet 1    HYDROcodone-ibuprofen (VICOPROFEN) 7.5-200 MG per tablet 1- 2 Q 6 hrs prn 90 DAY SUPPLY. Earliest Fill Date: 8/30/18 100 tablet 0    rOPINIRole (REQUIP) 1 MG tablet TAKE 1 TABLET BY MOUTH EVERY NIGHT 30 tablet 3    ondansetron (ZOFRAN) 8 MG tablet TAKE 1 TABLET BY MOUTH EVERY 8 HOURS AS NEEDED FOR NAUSEA OR VOMITING 24 tablet 3    Thyroid (LEVOTHYROXINE-LIOTHYRONINE) 60 MG TABS TAKE 1 TABLET BY MOUTH DAILY 30 tablet 5    methocarbamol (ROBAXIN) 750 MG tablet TK 1 T PO Q 8 H PRN  2    Handicap Placard MISC by Does not apply route Good for 5 years. 1 each 0    butalbital-acetaminophen-caffeine (FIORICET, ESGIC) -40 MG per tablet Take 1 tablet by mouth every 4 hours as needed for Headaches      nitrofurantoin (MACRODANTIN) 50 MG capsule Take 50 mg by mouth 4 times daily      ESTRACE VAGINAL 0.1 MG/GM vaginal cream PLACE 1 GRAM VAGINALLY DAILY 42.5 g 3     No current facility-administered medications for this visit. PMH, Surgical Hx, Family Hx, and Social Hx reviewed and updated. Health Maintenance reviewed. Objective    Vitals:    09/06/18 0817   BP: 108/68   Pulse: 68   Resp: 18   Temp: 97.7 °F (36.5 °C)   TempSrc: Temporal   Weight: 113 lb 11.2 oz (51.6 kg)   Height: 5' 8\" (1.727 m)       Pt is stable on current pain control treatment plan with no issues or side effects. Pt understands the risks associated with chronic opioid use and possibility of dependence.   Pt understands to use pain medication only as directed and minimize it's use when possible  Current

## 2018-10-04 DIAGNOSIS — G89.29 OTHER CHRONIC PAIN: ICD-10-CM

## 2018-10-04 RX ORDER — HYDROCODONE BITARTRATE AND IBUPROFEN 7.5; 2 MG/1; MG/1
1 TABLET, FILM COATED ORAL EVERY 6 HOURS PRN
Qty: 100 TABLET | Refills: 0 | Status: SHIPPED | OUTPATIENT
Start: 2018-10-04 | End: 2018-11-02 | Stop reason: SDUPTHER

## 2018-11-02 DIAGNOSIS — G89.29 OTHER CHRONIC PAIN: ICD-10-CM

## 2018-11-02 RX ORDER — HYDROCODONE BITARTRATE AND IBUPROFEN 7.5; 2 MG/1; MG/1
1 TABLET, FILM COATED ORAL EVERY 6 HOURS PRN
Qty: 100 TABLET | Refills: 0 | Status: SHIPPED | OUTPATIENT
Start: 2018-11-02 | End: 2018-12-06 | Stop reason: SDUPTHER

## 2018-11-07 RX ORDER — ROPINIROLE 1 MG/1
TABLET, FILM COATED ORAL
Qty: 30 TABLET | Refills: 1 | Status: SHIPPED | OUTPATIENT
Start: 2018-11-07 | End: 2018-12-06 | Stop reason: SDUPTHER

## 2018-11-19 ENCOUNTER — TELEPHONE (OUTPATIENT)
Dept: FAMILY MEDICINE CLINIC | Age: 58
End: 2018-11-19

## 2018-11-19 DIAGNOSIS — R00.2 PALPITATIONS: ICD-10-CM

## 2018-11-19 DIAGNOSIS — M04.1 FAMILIAL MEDITERRANEAN FEVER (HCC): ICD-10-CM

## 2018-11-19 DIAGNOSIS — E55.9 VITAMIN D DEFICIENCY: Primary | ICD-10-CM

## 2018-11-19 DIAGNOSIS — D50.8 OTHER IRON DEFICIENCY ANEMIA: ICD-10-CM

## 2018-11-19 DIAGNOSIS — R53.83 FATIGUE, UNSPECIFIED TYPE: ICD-10-CM

## 2018-11-19 DIAGNOSIS — E06.3 HASHIMOTO'S THYROIDITIS: ICD-10-CM

## 2018-11-19 DIAGNOSIS — F41.9 ANXIETY: ICD-10-CM

## 2018-11-30 LAB
IRON: 49
T4 FREE: 0.8
TOTAL IRON BINDING CAPACITY: 342
TSH SERPL DL<=0.05 MIU/L-ACNC: 1.84 UIU/ML
VITAMIN D 25-HYDROXY: 36.6
VITAMIN D2, 25 HYDROXY: NORMAL
VITAMIN D3,25 HYDROXY: NORMAL

## 2018-12-03 DIAGNOSIS — E06.3 HASHIMOTO'S THYROIDITIS: Primary | ICD-10-CM

## 2018-12-03 NOTE — LETTER
[address] [facility]  [address] [id number]  Phone: [phone]  Fax: [phone]    Gretchen Lopez MD    December 10, 2018      Chai Burk  [address] [address]      Dear Justin Napoles,    Below are the results from your recent visit:    Resulted Orders   TSH Without Reflex   Result Value Ref Range    TSH 1.840 uIU/mL   T4, Free   Result Value Ref Range    T4 Free 0.80    Iron and TIBC   Result Value Ref Range    Iron 49     TIBC 342    Vitamin D 25 Hydroxy   Result Value Ref Range    Vit D, 25-Hydroxy 36.6     Vitamin D3,25 Hydroxy      Vitamin D2, 25 Hydroxy                               Labs are within normal limits    Certain test results may be slightly high or low but still within normal limits as reviewed by me; do not be alarmed. These results can be reviewed during your next visit. If you have any questions or concerns, please don't hesitate to call.     Sincerely,    MD Chai Linares

## 2018-12-06 ENCOUNTER — OFFICE VISIT (OUTPATIENT)
Dept: FAMILY MEDICINE CLINIC | Age: 58
End: 2018-12-06
Payer: COMMERCIAL

## 2018-12-06 VITALS
SYSTOLIC BLOOD PRESSURE: 104 MMHG | HEART RATE: 80 BPM | TEMPERATURE: 97.9 F | BODY MASS INDEX: 19.34 KG/M2 | RESPIRATION RATE: 18 BRPM | DIASTOLIC BLOOD PRESSURE: 70 MMHG | WEIGHT: 123.2 LBS | HEIGHT: 67 IN

## 2018-12-06 DIAGNOSIS — G89.29 OTHER CHRONIC PAIN: Primary | ICD-10-CM

## 2018-12-06 DIAGNOSIS — G47.00 INSOMNIA, UNSPECIFIED TYPE: ICD-10-CM

## 2018-12-06 DIAGNOSIS — M41.00 INFANTILE IDIOPATHIC SCOLIOSIS, UNSPECIFIED SPINAL REGION: ICD-10-CM

## 2018-12-06 DIAGNOSIS — G25.81 RLS (RESTLESS LEGS SYNDROME): ICD-10-CM

## 2018-12-06 PROCEDURE — 99213 OFFICE O/P EST LOW 20 MIN: CPT | Performed by: FAMILY MEDICINE

## 2018-12-06 RX ORDER — HYDROCODONE BITARTRATE AND IBUPROFEN 7.5; 2 MG/1; MG/1
1 TABLET, FILM COATED ORAL EVERY 6 HOURS PRN
Qty: 100 TABLET | Refills: 0 | Status: SHIPPED | OUTPATIENT
Start: 2018-12-06 | End: 2019-01-04 | Stop reason: SDUPTHER

## 2018-12-06 RX ORDER — ZOLPIDEM TARTRATE 10 MG/1
TABLET ORAL
Qty: 30 TABLET | Refills: 2 | Status: SHIPPED | OUTPATIENT
Start: 2018-12-06 | End: 2019-03-05 | Stop reason: SDUPTHER

## 2018-12-06 RX ORDER — ROPINIROLE 1 MG/1
TABLET, FILM COATED ORAL
Qty: 30 TABLET | Refills: 1 | Status: SHIPPED | OUTPATIENT
Start: 2018-12-06 | End: 2019-01-07

## 2018-12-06 ASSESSMENT — ENCOUNTER SYMPTOMS
SHORTNESS OF BREATH: 0
ABDOMINAL PAIN: 0
CONSTIPATION: 0
DIARRHEA: 0
BACK PAIN: 1
SORE THROAT: 0
EYE ITCHING: 0
SINUS PRESSURE: 0
CHEST TIGHTNESS: 0
EYE DISCHARGE: 0

## 2018-12-06 NOTE — PROGRESS NOTES
Subjective  Demetrio Magaña, 63 y.o. female presents today with:  Chief Complaint   Patient presents with    Back Pain     Presents for refill on Vicoprofen that pt takes for chronic pain in back. States medication works well w/o issue.  Insomnia     Pt needs refill on Ambien that pt uses for condition PRN. States she has no issues or concerns w/ this medication. HPI    Patient presents today follow-up chronic pain and insomnia  Taking current medications which were reviewed. Problem list discussed. Eating okay. Gained a few pounds. Overall doing well. Has no other new problem / question. Health Maintenance reviewed with patient at visit          No other questions and or concerns for today's visit      Review of Systems   Constitutional: Negative for appetite change, fatigue and fever. HENT: Negative for congestion, ear pain, sinus pressure and sore throat. Eyes: Negative for discharge and itching. Respiratory: Negative for chest tightness and shortness of breath. Cardiovascular: Negative for chest pain and palpitations. Gastrointestinal: Negative for abdominal pain, constipation and diarrhea. Endocrine: Negative for polydipsia. Genitourinary: Negative for difficulty urinating. Musculoskeletal: Positive for back pain. Negative for gait problem. Skin: Negative. Neurological: Negative for dizziness, light-headedness and headaches. Hematological: Negative. Psychiatric/Behavioral: Negative for sleep disturbance. The patient is not nervous/anxious.           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

## 2018-12-11 ENCOUNTER — TELEPHONE (OUTPATIENT)
Dept: FAMILY MEDICINE CLINIC | Age: 58
End: 2018-12-11

## 2019-01-04 DIAGNOSIS — G89.29 OTHER CHRONIC PAIN: ICD-10-CM

## 2019-01-04 RX ORDER — HYDROCODONE BITARTRATE AND IBUPROFEN 7.5; 2 MG/1; MG/1
1 TABLET, FILM COATED ORAL EVERY 6 HOURS PRN
Qty: 100 TABLET | Refills: 0 | Status: SHIPPED | OUTPATIENT
Start: 2019-01-04 | End: 2019-02-05 | Stop reason: SDUPTHER

## 2019-01-07 RX ORDER — ROPINIROLE 1 MG/1
TABLET, FILM COATED ORAL
Qty: 30 TABLET | Refills: 5 | Status: SHIPPED | OUTPATIENT
Start: 2019-01-07

## 2019-01-08 ENCOUNTER — TELEPHONE (OUTPATIENT)
Dept: FAMILY MEDICINE CLINIC | Age: 59
End: 2019-01-08

## 2019-02-05 DIAGNOSIS — G89.29 OTHER CHRONIC PAIN: ICD-10-CM

## 2019-02-05 RX ORDER — HYDROCODONE BITARTRATE AND IBUPROFEN 7.5; 2 MG/1; MG/1
1 TABLET, FILM COATED ORAL EVERY 6 HOURS PRN
Qty: 100 TABLET | Refills: 0 | Status: SHIPPED | OUTPATIENT
Start: 2019-02-05 | End: 2019-02-07 | Stop reason: SDUPTHER

## 2019-02-07 DIAGNOSIS — G89.29 OTHER CHRONIC PAIN: ICD-10-CM

## 2019-02-07 RX ORDER — HYDROCODONE BITARTRATE AND IBUPROFEN 7.5; 2 MG/1; MG/1
1 TABLET, FILM COATED ORAL EVERY 6 HOURS PRN
Qty: 100 TABLET | Refills: 0
Start: 2019-02-07 | End: 2019-03-05 | Stop reason: SDUPTHER

## 2019-02-13 ENCOUNTER — OFFICE VISIT (OUTPATIENT)
Dept: FAMILY MEDICINE CLINIC | Age: 59
End: 2019-02-13
Payer: MEDICARE

## 2019-02-13 VITALS
HEIGHT: 67 IN | WEIGHT: 129.8 LBS | DIASTOLIC BLOOD PRESSURE: 82 MMHG | SYSTOLIC BLOOD PRESSURE: 130 MMHG | TEMPERATURE: 96.9 F | HEART RATE: 56 BPM | RESPIRATION RATE: 12 BRPM | BODY MASS INDEX: 20.37 KG/M2

## 2019-02-13 DIAGNOSIS — R10.31 RIGHT GROIN PAIN: Primary | ICD-10-CM

## 2019-02-13 DIAGNOSIS — M41.00 INFANTILE IDIOPATHIC SCOLIOSIS, UNSPECIFIED SPINAL REGION: ICD-10-CM

## 2019-02-13 DIAGNOSIS — M04.1 FMF (FAMILIAL MEDITERRANEAN FEVER) (HCC): ICD-10-CM

## 2019-02-13 DIAGNOSIS — K40.90 NON-RECURRENT UNILATERAL INGUINAL HERNIA WITHOUT OBSTRUCTION OR GANGRENE: ICD-10-CM

## 2019-02-13 PROCEDURE — 99213 OFFICE O/P EST LOW 20 MIN: CPT | Performed by: FAMILY MEDICINE

## 2019-02-13 ASSESSMENT — ENCOUNTER SYMPTOMS
ABDOMINAL PAIN: 0
EYES NEGATIVE: 1
CONSTIPATION: 0
DIARRHEA: 0
SHORTNESS OF BREATH: 0
NAUSEA: 0
COUGH: 0

## 2019-03-05 DIAGNOSIS — G47.00 INSOMNIA, UNSPECIFIED TYPE: ICD-10-CM

## 2019-03-05 DIAGNOSIS — G89.29 OTHER CHRONIC PAIN: ICD-10-CM

## 2019-03-05 RX ORDER — ZOLPIDEM TARTRATE 10 MG/1
TABLET ORAL
Qty: 30 TABLET | Refills: 2 | Status: SHIPPED | OUTPATIENT
Start: 2019-03-05 | End: 2019-04-04

## 2019-03-05 RX ORDER — HYDROCODONE BITARTRATE AND IBUPROFEN 7.5; 2 MG/1; MG/1
1 TABLET, FILM COATED ORAL EVERY 6 HOURS PRN
Qty: 100 TABLET | Refills: 0 | Status: SHIPPED | OUTPATIENT
Start: 2019-03-05 | End: 2019-03-29 | Stop reason: SDUPTHER

## 2019-03-22 ENCOUNTER — TELEPHONE (OUTPATIENT)
Dept: FAMILY MEDICINE CLINIC | Age: 59
End: 2019-03-22

## 2019-03-29 DIAGNOSIS — G89.29 OTHER CHRONIC PAIN: ICD-10-CM

## 2019-03-29 RX ORDER — HYDROCODONE BITARTRATE AND IBUPROFEN 7.5; 2 MG/1; MG/1
1 TABLET, FILM COATED ORAL EVERY 6 HOURS PRN
Qty: 100 TABLET | Refills: 0 | Status: SHIPPED | OUTPATIENT
Start: 2019-03-29 | End: 2019-04-28

## 2019-12-27 LAB
FOLATE: 19.6 NG/ML
VITAMIN B-12: 303 PG/ML (ref 211–911)
VITAMIN D 25-HYDROXY: 29 NG/ML

## 2019-12-28 LAB — FERRITIN: 56 UG/L (ref 8–150)

## 2020-05-28 LAB
FOLATE: >23.3 NG/ML
VITAMIN B-12: 469 PG/ML (ref 211–911)
VITAMIN D 25-HYDROXY: 38 NG/ML

## 2023-03-02 LAB
6-ACETYLMORPHINE: <25 NG/ML
7-AMINOCLONAZEPAM: <25 NG/ML
ALPHA-HYDROXYALPRAZOLAM: <25 NG/ML
ALPHA-HYDROXYMIDAZOLAM: <25 NG/ML
ALPRAZOLAM: <25 NG/ML
AMPHETAMINE (PRESENCE) IN URINE BY SCREEN METHOD: ABNORMAL
BARBITURATES PRESENCE IN URINE BY SCREEN METHOD: ABNORMAL
CANNABINOIDS IN URINE BY SCREEN METHOD: ABNORMAL
CHLORDIAZEPOXIDE: <25 NG/ML
CLONAZEPAM: <25 NG/ML
COCAINE (PRESENCE) IN URINE BY SCREEN METHOD: ABNORMAL
CODEINE: <50 NG/ML
CREATINE, URINE FOR DRUG: 127.4 MG/DL
DIAZEPAM: <25 NG/ML
DRUG SCREEN COMMENT URINE: ABNORMAL
EDDP: <25 NG/ML
FENTANYL CONFIRMATION, URINE: <2.5 NG/ML
HYDROCODONE: 2199 NG/ML
HYDROMORPHONE: 684 NG/ML
LORAZEPAM: <25 NG/ML
METHADONE CONFIRMATION,URINE: <25 NG/ML
MIDAZOLAM: <25 NG/ML
MORPHINE URINE: <50 NG/ML
NORDIAZEPAM: <25 NG/ML
NORFENTANYL: <2.5 NG/ML
NORHYDROCODONE: >1000 NG/ML
NOROXYCODONE: <25 NG/ML
O-DESMETHYLTRAMADOL: <50 NG/ML
OXAZEPAM: <25 NG/ML
OXYCODONE: <25 NG/ML
OXYMORPHONE: <25 NG/ML
PHENCYCLIDINE (PRESENCE) IN URINE BY SCREEN METHOD: ABNORMAL
TEMAZEPAM: <25 NG/ML
TRAMADOL: <50 NG/ML
ZOLPIDEM METABOLITE (ZCA): >1000 NG/ML
ZOLPIDEM: <25 NG/ML

## 2023-03-27 ENCOUNTER — TELEPHONE (OUTPATIENT)
Dept: PRIMARY CARE | Facility: CLINIC | Age: 63
End: 2023-03-27
Payer: MEDICARE

## 2023-03-27 DIAGNOSIS — M19.90 OSTEOARTHRITIS, UNSPECIFIED OSTEOARTHRITIS TYPE, UNSPECIFIED SITE: ICD-10-CM

## 2023-03-27 DIAGNOSIS — H65.90: Primary | ICD-10-CM

## 2023-03-27 RX ORDER — METHYLPREDNISOLONE 4 MG/1
TABLET ORAL
Qty: 21 TABLET | Refills: 0 | Status: SHIPPED | OUTPATIENT
Start: 2023-03-27 | End: 2023-08-28 | Stop reason: ALTCHOICE

## 2023-03-27 RX ORDER — HYDROCODONE BITARTRATE AND IBUPROFEN 7.5; 2 MG/1; MG/1
1-2 TABLET, FILM COATED ORAL EVERY 6 HOURS
Qty: 120 TABLET | Refills: 0 | Status: SHIPPED | OUTPATIENT
Start: 2023-03-27 | End: 2023-04-26

## 2023-03-27 RX ORDER — AZITHROMYCIN 250 MG/1
TABLET, FILM COATED ORAL
Qty: 6 TABLET | Refills: 0 | Status: SHIPPED | OUTPATIENT
Start: 2023-03-27 | End: 2023-04-01

## 2023-03-27 NOTE — TELEPHONE ENCOUNTER
PATIENT  CALLED AND STATED THAT SHE WAS PRESCRIBED A MEDICATION FOR THE DIZZINESS SHE IS HAVING.  THE MEDICATION ISN'T DOING ANYTHING FOR HER.  SHE IS VOMITING DAILY DUE TO THE DIZZINESS.      WOULD LIKE TO KNOW IF IT COULD BE SOMETHING WITH HER MIDDLE EAR AND IF A DOSE PACK CAN GET CALLED INTO THE PHARMACY FOR HER.     PLEASE ADVISE  PHONE  [phone]  PHARMACY  [facility] [employer]

## 2023-03-27 NOTE — TELEPHONE ENCOUNTER
Rx Refill Request Telephone Encounter    Name: Jennifer Gonzalez  :  1960    Medication Name:   NORCO   Dose (Optional):   7.5-200 MG  Quantity (Optional):   120  Directions (Optional):   TAKE 1-2 EVERY 6 HOURS    ALLERGIES:    KEFLEX/LEVAQUIN/OXYCONTIN/BENADRYL/PCN    LAST DRUG SCREEN:     2023  LAST MED CONTRACT:    2023    Specific Pharmacy location:    [facility]    Date of last appointment:    2023  Date of next appointment:    2023    Best number to reach patient:    [phone] (home)

## 2023-03-28 ENCOUNTER — TELEPHONE (OUTPATIENT)
Dept: PRIMARY CARE | Facility: CLINIC | Age: 63
End: 2023-03-28
Payer: MEDICARE

## 2023-03-28 DIAGNOSIS — M19.90 OSTEOARTHRITIS, UNSPECIFIED OSTEOARTHRITIS TYPE, UNSPECIFIED SITE: ICD-10-CM

## 2023-03-28 RX ORDER — HYDROCODONE BITARTRATE AND ACETAMINOPHEN 7.5; 325 MG/1; MG/1
1-2 TABLET ORAL EVERY 6 HOURS
Qty: 120 TABLET | Refills: 0 | Status: SHIPPED | OUTPATIENT
Start: 2023-03-28 | End: 2023-04-27 | Stop reason: SDUPTHER

## 2023-03-28 NOTE — TELEPHONE ENCOUNTER
Pt is calling in regards to the vicoprofen 7.5 mgthat you sent in to her pharmacy. They called her and told her that they no longer manufacture that so it's unavailable. She wanted to know if you could send in something equivalent, maybe Norco (she doesn't want to use Percocet)    Please advise  Thanks      Pt's # [phone]    Preferred pharmacy [facility]

## 2023-04-27 DIAGNOSIS — M19.90 OSTEOARTHRITIS, UNSPECIFIED OSTEOARTHRITIS TYPE, UNSPECIFIED SITE: ICD-10-CM

## 2023-04-27 RX ORDER — HYDROCODONE BITARTRATE AND ACETAMINOPHEN 7.5; 325 MG/1; MG/1
1-2 TABLET ORAL EVERY 6 HOURS
Qty: 120 TABLET | Refills: 0 | Status: SHIPPED | OUTPATIENT
Start: 2023-04-27 | End: 2023-05-30 | Stop reason: SDUPTHER

## 2023-04-27 NOTE — TELEPHONE ENCOUNTER
----- Message from Jennifer Gonzalez sent at 4/27/2023  7:42 AM EDT -----  Regarding: [place] refill  Contact: [phone]  Could you please refill my norco 7.5 to drug [facility].   I’m almost out.   Thank you

## 2023-05-17 DIAGNOSIS — G20.A1 PARKINSON DISEASE (MULTI): ICD-10-CM

## 2023-05-17 RX ORDER — ROPINIROLE 1 MG/1
TABLET, FILM COATED ORAL
Qty: 180 TABLET | Refills: 0 | Status: SHIPPED | OUTPATIENT
Start: 2023-05-17 | End: 2023-08-15

## 2023-05-30 ENCOUNTER — OFFICE VISIT (OUTPATIENT)
Dept: PRIMARY CARE | Facility: CLINIC | Age: 63
End: 2023-05-30
Payer: MEDICARE

## 2023-05-30 VITALS
RESPIRATION RATE: 18 BRPM | TEMPERATURE: 97.8 F | WEIGHT: 120 LBS | OXYGEN SATURATION: 98 % | HEART RATE: 80 BPM | HEIGHT: 68 IN | SYSTOLIC BLOOD PRESSURE: 110 MMHG | DIASTOLIC BLOOD PRESSURE: 70 MMHG | BODY MASS INDEX: 18.19 KG/M2

## 2023-05-30 DIAGNOSIS — C55 MALIGNANT NEOPLASM OF UTERUS, UNSPECIFIED SITE (MULTI): ICD-10-CM

## 2023-05-30 DIAGNOSIS — F51.01 PRIMARY INSOMNIA: ICD-10-CM

## 2023-05-30 DIAGNOSIS — M54.2 NECK PAIN, CHRONIC: Primary | ICD-10-CM

## 2023-05-30 DIAGNOSIS — F41.9 ANXIETY: ICD-10-CM

## 2023-05-30 DIAGNOSIS — G35 MULTIPLE SCLEROSIS (MULTI): ICD-10-CM

## 2023-05-30 DIAGNOSIS — M19.90 OSTEOARTHRITIS, UNSPECIFIED OSTEOARTHRITIS TYPE, UNSPECIFIED SITE: ICD-10-CM

## 2023-05-30 DIAGNOSIS — E46 PROTEIN-CALORIE MALNUTRITION, UNSPECIFIED SEVERITY (MULTI): ICD-10-CM

## 2023-05-30 DIAGNOSIS — G89.29 NECK PAIN, CHRONIC: Primary | ICD-10-CM

## 2023-05-30 DIAGNOSIS — G95.20 SCC (SPINAL CORD COMPRESSION) (MULTI): ICD-10-CM

## 2023-05-30 DIAGNOSIS — M04.1 PERIODIC FEVER SYNDROMES (MULTI): ICD-10-CM

## 2023-05-30 DIAGNOSIS — D68.1 FACTOR XI DEFICIENCY (MULTI): ICD-10-CM

## 2023-05-30 DIAGNOSIS — E06.3 HASHIMOTO'S DISEASE: ICD-10-CM

## 2023-05-30 DIAGNOSIS — E78.2 MIXED HYPERLIPIDEMIA: ICD-10-CM

## 2023-05-30 PROBLEM — E27.1 ADRENAL INSUFFICIENCY (ADDISON'S DISEASE) (MULTI): Status: ACTIVE | Noted: 2023-05-30

## 2023-05-30 PROBLEM — M12.30 PALINDROMIC RHEUMATISM: Status: ACTIVE | Noted: 2023-05-30

## 2023-05-30 PROBLEM — R00.0 TACHYCARDIA: Status: ACTIVE | Noted: 2023-05-30

## 2023-05-30 PROBLEM — F98.8 ATTENTION DEFICIT DISORDER (ADD) WITHOUT HYPERACTIVITY: Status: ACTIVE | Noted: 2023-05-30

## 2023-05-30 PROBLEM — R33.9 URINARY RETENTION: Status: ACTIVE | Noted: 2023-05-30

## 2023-05-30 PROBLEM — M79.604 RIGHT LEG PAIN: Status: ACTIVE | Noted: 2023-05-30

## 2023-05-30 PROBLEM — R20.0 NUMBNESS AND TINGLING OF BOTH UPPER EXTREMITIES: Status: ACTIVE | Noted: 2023-05-30

## 2023-05-30 PROBLEM — H26.499 AFTER-CATARACT OBSCURING VISION: Status: ACTIVE | Noted: 2017-07-14

## 2023-05-30 PROBLEM — R10.31 DEEP INGUINAL PAIN, RIGHT: Status: ACTIVE | Noted: 2023-05-30

## 2023-05-30 PROBLEM — G47.33 OBSTRUCTIVE SLEEP APNEA, ADULT: Status: ACTIVE | Noted: 2023-05-30

## 2023-05-30 PROBLEM — R42 DIZZINESS: Status: ACTIVE | Noted: 2023-05-30

## 2023-05-30 PROBLEM — R43.9 DYSOSMIA: Status: ACTIVE | Noted: 2023-05-30

## 2023-05-30 PROBLEM — R01.1 MURMUR: Status: ACTIVE | Noted: 2023-05-30

## 2023-05-30 PROBLEM — F17.200 CURRENT SMOKER ON SOME DAYS: Status: ACTIVE | Noted: 2023-05-30

## 2023-05-30 PROBLEM — N81.6 RECTOCELE: Status: ACTIVE | Noted: 2023-05-30
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
## 2023-05-30 PROBLEM — M96.0 PSEUDARTHROSIS FOLLOWING SPINAL FUSION: Status: ACTIVE | Noted: 2023-05-30

## 2023-05-30 PROBLEM — F32.A MILD DEPRESSION: Status: ACTIVE | Noted: 2023-05-30

## 2023-05-30 PROBLEM — R25.2 MUSCLE CRAMPS: Status: ACTIVE | Noted: 2023-05-30

## 2023-05-30 PROBLEM — R03.0 ELEVATED BLOOD PRESSURE READING: Status: ACTIVE | Noted: 2023-05-30

## 2023-05-30 PROBLEM — F11.90 CHRONIC, CONTINUOUS USE OF OPIOIDS: Status: ACTIVE | Noted: 2023-05-30

## 2023-05-30 PROBLEM — M25.471 BILATERAL SWELLING OF FEET AND ANKLES: Status: ACTIVE | Noted: 2023-05-30

## 2023-05-30 PROBLEM — M54.12 CERVICAL RADICULAR PAIN: Status: ACTIVE | Noted: 2023-05-30

## 2023-05-30 PROBLEM — N35.919 URETHRAL STRICTURE: Status: ACTIVE | Noted: 2023-05-30

## 2023-05-30 PROBLEM — M25.474 BILATERAL SWELLING OF FEET AND ANKLES: Status: ACTIVE | Noted: 2023-05-30

## 2023-05-30 PROBLEM — G25.81 RESTLESS LEG SYNDROME: Status: ACTIVE | Noted: 2023-05-30

## 2023-05-30 PROBLEM — M25.472 BILATERAL SWELLING OF FEET AND ANKLES: Status: ACTIVE | Noted: 2023-05-30

## 2023-05-30 PROBLEM — E53.8 VITAMIN B12 DEFICIENCY: Status: ACTIVE | Noted: 2023-05-30

## 2023-05-30 PROBLEM — R20.2 NUMBNESS AND TINGLING OF BOTH UPPER EXTREMITIES: Status: ACTIVE | Noted: 2023-05-30

## 2023-05-30 PROBLEM — I25.10 CAD (CORONARY ARTERY DISEASE), NATIVE CORONARY ARTERY: Status: ACTIVE | Noted: 2023-05-30

## 2023-05-30 PROBLEM — M25.475 BILATERAL SWELLING OF FEET AND ANKLES: Status: ACTIVE | Noted: 2023-05-30

## 2023-05-30 PROBLEM — D49.4 NEOPLASM OF BLADDER: Status: ACTIVE | Noted: 2023-05-30

## 2023-05-30 PROBLEM — M54.9 DORSODYNIA: Status: ACTIVE | Noted: 2023-05-30

## 2023-05-30 PROBLEM — N39.3 FEMALE STRESS INCONTINENCE: Status: ACTIVE | Noted: 2023-05-30

## 2023-05-30 PROBLEM — I49.8 VENTRICULAR TRIGEMINY: Status: ACTIVE | Noted: 2023-05-30

## 2023-05-30 PROBLEM — M41.9 SCOLIOSIS: Status: ACTIVE | Noted: 2023-05-30

## 2023-05-30 PROBLEM — R00.2 PALPITATIONS: Status: ACTIVE | Noted: 2023-05-30

## 2023-05-30 PROBLEM — R51.9 HEADACHE: Status: ACTIVE | Noted: 2023-05-30

## 2023-05-30 PROBLEM — R13.13 PHARYNGEAL DYSPHAGIA: Status: ACTIVE | Noted: 2023-05-30

## 2023-05-30 PROBLEM — K40.90 RIGHT INGUINAL HERNIA: Status: ACTIVE | Noted: 2023-05-30

## 2023-05-30 PROBLEM — M62.838 MUSCLE SPASMS OF NECK: Status: ACTIVE | Noted: 2023-05-30

## 2023-05-30 PROCEDURE — 1036F TOBACCO NON-USER: CPT | Performed by: FAMILY MEDICINE

## 2023-05-30 PROCEDURE — 99213 OFFICE O/P EST LOW 20 MIN: CPT | Performed by: FAMILY MEDICINE

## 2023-05-30 RX ORDER — ESCITALOPRAM OXALATE 10 MG/1
1 TABLET ORAL DAILY
COMMUNITY
Start: 2022-08-31

## 2023-05-30 RX ORDER — ZOLPIDEM TARTRATE 10 MG/1
10 TABLET ORAL NIGHTLY
COMMUNITY
End: 2023-05-30 | Stop reason: SDUPTHER

## 2023-05-30 RX ORDER — NAPROXEN SODIUM 220 MG/1
1 TABLET, FILM COATED ORAL DAILY
COMMUNITY
Start: 2022-08-25

## 2023-05-30 RX ORDER — LANOLIN ALCOHOL/MO/W.PET/CERES
1 CREAM (GRAM) TOPICAL 2 TIMES DAILY
COMMUNITY
Start: 2022-09-27

## 2023-05-30 RX ORDER — SULFAMETHOXAZOLE AND TRIMETHOPRIM 800; 160 MG/1; MG/1
TABLET ORAL
COMMUNITY
Start: 2013-12-20

## 2023-05-30 RX ORDER — MULTIVIT-MIN/IRON/FOLIC ACID/K 18-600-40
CAPSULE ORAL
COMMUNITY

## 2023-05-30 RX ORDER — ZOLPIDEM TARTRATE 10 MG/1
10 TABLET ORAL NIGHTLY
Qty: 30 TABLET | Refills: 3 | Status: SHIPPED | OUTPATIENT
Start: 2023-05-30 | End: 2023-08-28 | Stop reason: SDUPTHER

## 2023-05-30 RX ORDER — VITAMIN B COMPLEX
CAPSULE ORAL
COMMUNITY

## 2023-05-30 RX ORDER — CHOLECALCIFEROL (VITAMIN D3) 125 MCG
CAPSULE ORAL WEEKLY
COMMUNITY

## 2023-05-30 RX ORDER — ATORVASTATIN CALCIUM 20 MG/1
1 TABLET, FILM COATED ORAL NIGHTLY
COMMUNITY
Start: 2022-10-31 | End: 2023-08-28 | Stop reason: ALTCHOICE

## 2023-05-30 RX ORDER — METOPROLOL SUCCINATE 50 MG/1
TABLET, EXTENDED RELEASE ORAL
COMMUNITY
Start: 2022-09-27

## 2023-05-30 RX ORDER — MECLIZINE HYDROCHLORIDE 25 MG/1
TABLET ORAL
COMMUNITY
Start: 2023-02-28

## 2023-05-30 RX ORDER — HYDROCODONE BITARTRATE AND ACETAMINOPHEN 7.5; 325 MG/1; MG/1
1-2 TABLET ORAL EVERY 6 HOURS
Qty: 120 TABLET | Refills: 0 | Status: SHIPPED | OUTPATIENT
Start: 2023-05-30 | End: 2023-06-27 | Stop reason: SDUPTHER

## 2023-05-30 NOTE — PROGRESS NOTES
"Subjective   Patient ID: Jennifer Gonzalez is a 62 y.o. female who presents for Hypertension, Hypothyroidism, Pain, and Insomnia.  HPI    Hypothyroidism  Taking Schlater Thyroid 60 mg  Taking on an empty stomach  No significant weight loss/gain  No heat/cold intolerances  No increase in hair loss/dry skin    Patient presents for chronic pain  Is currently taking norco   Rates the pain a 7/10 over the past 7 days  Reports that the medication gives 90% pain control/relief  OARRS reviewed today  CSA 2/28/23  Last took YESTERDAY     HTN follow up  Denies chest pain,SOB  Denies any swelling, headaches, lightheadedness or dizziness  Eats a generally healthy diet  Exercises  Does check BP at home  Currently taking metoprolol     Patient in office being seen for a follow up on Insomnia  Currently taking AMBIEN  Last took LAST NIGHT   Medication agreement signed on 2/28/23  Reports that the medication is working 10/10  100% managed with the medication  States there are no adverse effects.     No other concerns or questions at this time    ROS  Constitutional- No activity change. No appetite change.  Eyes- Denies vision changes.  Respiratory- No shortness of breath.  Cardiovascular- No palpitations. No chest pain.  GI- No nausea or vomiting. No diarrhea or constipation. Denies abdominal pain.  Musculoskeletal- Denies joint swelling.  Extremities- No edema.  Neurological- Denies headaches. Denies dizziness.  Skin- No rashes.  Psychiatric/Behavioral- Denies significant anxiety, or depressed mood.     Objective     /70 (BP Location: Left arm, Patient Position: Sitting, BP Cuff Size: Adult)   Pulse 80   Temp 36.6 °C (97.8 °F) (Temporal)   Resp 18   Ht 1.727 m (5' 8\")   Wt 54.4 kg (120 lb)   SpO2 98%   BMI 18.25 kg/m²     Allergies   Allergen Reactions    Levofloxacin Hives, Other and Swelling     JOINT SWELLING    Morphine Itching    Pentazocine Other    Cephalexin Hives and Rash    Diphenhydramine Anxiety and Unknown    " Penicillins Hives and Rash       Constitutional-- Well-nourished.  No distress  Head- unremarkable.  Eyes- PERRL.  Conjunctiva normal.  Nose- Normal.  No rhinorrhea noted.  Throat- Oropharynx is clear and moist.  Neck- Supple with no thyromegaly.  No significant cervical adenopathy noted.  Pulmonary/Chest- Breath sounds normal with normal effort.  No wheezing.  Heart- Regular rate and rhythm.  No murmur.  Abdomen- Soft and non-tender.  No masses noted.  Musculoskeletal-chronic low neck and back pain exacerbated with range of motion.  Extremities- No edema.   Neurological- Alert.  No noted deficits.  Psychiatric/Behavioral- Mood and affect normal.  Behavior normal.     Assessment/Plan   1. Neck pain, chronic        2. Osteoarthritis, unspecified osteoarthritis type, unspecified site  HYDROcodone-acetaminophen (Norco) 7.5-325 mg tablet      3. Primary insomnia  zolpidem (Ambien) 10 mg tablet      4. Hashimoto's disease        5. Mixed hyperlipidemia        6. Protein-calorie malnutrition, unspecified severity (CMS/HCC)        7. SCC (spinal cord compression) (CMS/HCC)        8. Multiple sclerosis (CMS/HCC)        9. Periodic fever syndromes (CMS/HCC)        10. Factor XI deficiency (CMS/HCC)        11. Malignant neoplasm of uterus, unspecified site (CMS/HCC)        12. Anxiety           Long talk. Treatment options reviewed.  Mood stable  Hypertension controlled  Cholesterol controlled  Thyroid function stable  Insomnia controlled  Arthritis stable     Labs as ordered     Continue and take your medications as prescribed.  Take the least amount of medication required for symptom control     Health Maintenance issues discussed.    Importance of healthy diet and regular exercise regimen discussed.    We will contact you with any test results ordered. If you do not hear from us, please contact.    Follow-up as instructed or sooner if any problems or symptoms do not resolve as expected.    Scribe Attestation  By signing my  name below, David CAMACHO Scribe   attest that this documentation has been prepared under the direction and in the presence of Miguel Magaña MD.

## 2023-06-16 DIAGNOSIS — F41.9 ANXIETY: Primary | ICD-10-CM

## 2023-06-16 RX ORDER — ESCITALOPRAM OXALATE 5 MG/1
5 TABLET ORAL DAILY
Qty: 90 TABLET | Refills: 1 | Status: SHIPPED | OUTPATIENT
Start: 2023-06-16 | End: 2023-08-28 | Stop reason: ALTCHOICE

## 2023-06-16 NOTE — TELEPHONE ENCOUNTER
----- Message from Jennifer Gonzalez sent at 6/16/2023  8:48 AM EDT -----  Regarding: Refill  Contact: [phone]  Could I get refills on Lexapro FIVE MG? The 10 mg was too strong for me and gave me nasty side effects . I do much better on the 5mg  Please send it to [facility]  Thank you

## 2023-06-27 DIAGNOSIS — M19.90 OSTEOARTHRITIS, UNSPECIFIED OSTEOARTHRITIS TYPE, UNSPECIFIED SITE: ICD-10-CM

## 2023-06-27 RX ORDER — HYDROCODONE BITARTRATE AND ACETAMINOPHEN 7.5; 325 MG/1; MG/1
1-2 TABLET ORAL EVERY 6 HOURS
Qty: 120 TABLET | Refills: 0 | Status: SHIPPED | OUTPATIENT
Start: 2023-06-27 | End: 2023-07-31 | Stop reason: SDUPTHER

## 2023-06-27 NOTE — TELEPHONE ENCOUNTER
----- Message from Jennifer Gonzalez sent at 6/27/2023  2:53 PM EDT -----  Regarding: Refill   Contact: [phone]  Could I please get a refill on my Norco 7.5 ? We are going out of town for my son’s wedding and will run out while gone.   Please phone it into [facility]. Thank you

## 2023-07-27 LAB
APPEARANCE, URINE: CLEAR
BILIRUBIN, URINE: NEGATIVE
BLOOD, URINE: ABNORMAL
COLOR, URINE: YELLOW
GLUCOSE, URINE: NEGATIVE MG/DL
KETONES, URINE: NEGATIVE MG/DL
LEUKOCYTE ESTERASE, URINE: NEGATIVE
MUCUS, URINE: ABNORMAL /LPF
NITRITE, URINE: NEGATIVE
PH, URINE: 6 (ref 5–8)
PROTEIN, URINE: NEGATIVE MG/DL
RBC, URINE: 20 /HPF (ref 0–5)
SPECIFIC GRAVITY, URINE: 1.02 (ref 1–1.03)
UROBILINOGEN, URINE: <2 MG/DL (ref 0–1.9)
WBC, URINE: <1 /HPF (ref 0–5)

## 2023-07-28 LAB — URINE CULTURE: NO GROWTH

## 2023-07-31 DIAGNOSIS — M19.90 OSTEOARTHRITIS, UNSPECIFIED OSTEOARTHRITIS TYPE, UNSPECIFIED SITE: ICD-10-CM

## 2023-07-31 RX ORDER — HYDROCODONE BITARTRATE AND ACETAMINOPHEN 7.5; 325 MG/1; MG/1
1-2 TABLET ORAL EVERY 6 HOURS
Qty: 120 TABLET | Refills: 0 | Status: SHIPPED | OUTPATIENT
Start: 2023-07-31 | End: 2023-08-28 | Stop reason: SDUPTHER

## 2023-07-31 NOTE — TELEPHONE ENCOUNTER
----- Message from Jennifer Gonzalez sent at 7/31/2023 11:16 AM EDT -----  Regarding: Refill  Contact: [phone]  Could I please get a refill on my Norco 7.5/325  Please send it to [facility]   Thank you

## 2023-08-15 DIAGNOSIS — G20.A1 PARKINSON DISEASE (MULTI): ICD-10-CM

## 2023-08-15 RX ORDER — ROPINIROLE 1 MG/1
TABLET, FILM COATED ORAL
Qty: 180 TABLET | Refills: 0 | Status: SHIPPED | OUTPATIENT
Start: 2023-08-15 | End: 2023-11-08

## 2023-08-28 ENCOUNTER — OFFICE VISIT (OUTPATIENT)
Dept: PRIMARY CARE | Facility: CLINIC | Age: 63
End: 2023-08-28
Payer: MEDICARE

## 2023-08-28 VITALS
SYSTOLIC BLOOD PRESSURE: 120 MMHG | RESPIRATION RATE: 18 BRPM | WEIGHT: 121.2 LBS | TEMPERATURE: 98 F | HEART RATE: 70 BPM | DIASTOLIC BLOOD PRESSURE: 70 MMHG | HEIGHT: 68 IN | OXYGEN SATURATION: 98 % | BODY MASS INDEX: 18.37 KG/M2

## 2023-08-28 DIAGNOSIS — E78.2 MIXED HYPERLIPIDEMIA: ICD-10-CM

## 2023-08-28 DIAGNOSIS — E06.3 HASHIMOTO'S DISEASE: ICD-10-CM

## 2023-08-28 DIAGNOSIS — C55 MALIGNANT NEOPLASM OF UTERUS, UNSPECIFIED SITE (MULTI): ICD-10-CM

## 2023-08-28 DIAGNOSIS — F41.9 ANXIETY: ICD-10-CM

## 2023-08-28 DIAGNOSIS — G95.20 SCC (SPINAL CORD COMPRESSION) (MULTI): ICD-10-CM

## 2023-08-28 DIAGNOSIS — G35 MULTIPLE SCLEROSIS (MULTI): ICD-10-CM

## 2023-08-28 DIAGNOSIS — I47.29: ICD-10-CM

## 2023-08-28 DIAGNOSIS — G89.29 NECK PAIN, CHRONIC: ICD-10-CM

## 2023-08-28 DIAGNOSIS — F51.01 PRIMARY INSOMNIA: ICD-10-CM

## 2023-08-28 DIAGNOSIS — M54.2 NECK PAIN, CHRONIC: ICD-10-CM

## 2023-08-28 DIAGNOSIS — M19.90 OSTEOARTHRITIS, UNSPECIFIED OSTEOARTHRITIS TYPE, UNSPECIFIED SITE: ICD-10-CM

## 2023-08-28 PROCEDURE — 99213 OFFICE O/P EST LOW 20 MIN: CPT | Performed by: FAMILY MEDICINE

## 2023-08-28 PROCEDURE — 1036F TOBACCO NON-USER: CPT | Performed by: FAMILY MEDICINE

## 2023-08-28 RX ORDER — HYDROCODONE BITARTRATE AND ACETAMINOPHEN 7.5; 325 MG/1; MG/1
1-2 TABLET ORAL EVERY 6 HOURS
Qty: 120 TABLET | Refills: 0 | Status: SHIPPED | OUTPATIENT
Start: 2023-08-28 | End: 2023-09-27

## 2023-08-28 RX ORDER — ZOLPIDEM TARTRATE 10 MG/1
10 TABLET ORAL NIGHTLY
Qty: 30 TABLET | Refills: 3 | Status: SHIPPED | OUTPATIENT
Start: 2023-08-28 | End: 2023-11-27 | Stop reason: SDUPTHER

## 2023-08-28 NOTE — PROGRESS NOTES
"Subjective   Patient ID: Jennifer Gonzalez is a 62 y.o. female who presents for chronic pain, Hypertension, and Insomnia.  HPI  Patient presents for chronic pain  Is currently taking norco   Rates the pain a 6/10 over the past 7 days  Reports that the medication gives 90% pain control/relief  OARRS reviewed today  CSA 2/28/23  Last took this morning     Patient in office being seen for a follow up on Insomnia  Currently taking AMBIEN  Last took LAST NIGHT   Medication agreement signed on 2/28/23  Reports that the medication is working 10/10  100% managed with the medication  States there are no adverse effects.     HTN follow up  Denies chest pain,SOB, swelling, headaches, lightheadedness or dizziness.   Eats a generally healthy diet, Exercises.   Does not check BP at home.   Currently taking metoprolol     ROS  Constitutional- No activity change. No appetite change.  Eyes- Denies vision changes.  Respiratory- No shortness of breath.  Cardiovascular- No palpitations. No chest pain.  GI- No nausea or vomiting. No diarrhea or constipation. Denies abdominal pain.  Musculoskeletal- Denies joint swelling.  Extremities- No edema.  Neurological- Denies headaches. Denies dizziness.  Skin- No rashes.  Psychiatric/Behavioral- Denies significant anxiety, or depressed mood.     Objective     /70   Pulse 70   Temp 36.7 °C (98 °F) (Temporal)   Resp 18   Ht 1.727 m (5' 8\")   Wt 55 kg (121 lb 3.2 oz)   SpO2 98%   BMI 18.43 kg/m²     Allergies   Allergen Reactions    Levofloxacin Hives, Other and Swelling     JOINT SWELLING    Morphine Itching    Pentazocine Other    Cephalexin Hives and Rash    Diphenhydramine Anxiety and Unknown    Penicillins Hives and Rash       Constitutional-- Well-nourished.  No distressr.  Eyes- PERRL.  Conjunctiva normal.  Nose- Normal.  No rhinorrhea noted.  Throat- Oropharynx is clear and moist.  Neck- Supple with no thyromegaly.  No significant cervical adenopathy noted.  Pulmonary/Chest- " Breath sounds normal with normal effort.  No wheezing.  Heart- Regular rate and rhythm.  No murmur.  Abdomen- Soft and non-tender.  No masses noted.  Musculoskeletal-OA changes hands noted.  Chronic low back and low neck pain exacerbated with range of motion.  Extremities- No edema.   Neurological- Alert.  No noted deficits.  Skin- Warm.  No rashes.  Psychiatric/Behavioral- Mood and affect normal.  Behavior normal.     Assessment/Plan   1. Osteoarthritis, unspecified osteoarthritis type, unspecified site  HYDROcodone-acetaminophen (Norco) 7.5-325 mg tablet      2. Primary insomnia  zolpidem (Ambien) 10 mg tablet      3. Hashimoto's disease        4. Mixed hyperlipidemia        5. Multiple sclerosis (CMS/HCC)        6. SCC (spinal cord compression) (CMS/HCC)        7. Anxiety        8. Neck pain, chronic        9. Malignant neoplasm of uterus, unspecified site (CMS/HCC)        10. Short-coupled ventricular tachycardia (CMS/HCC)               Long talk. Treatment options reviewed.  Arthritis stable with chronic pain controlled.  Understands to use least amount of pain medication to control her symptoms.  MS stable.  Anxiety controlled.  Uterine cancer in remission  Continue and take your medications as prescribed.    Health Maintenance issues discussed.    Importance of healthy diet and regular exercise regimen discussed.      Follow-up as instructed or sooner if any problems or symptoms do not resolve as expected.

## 2023-10-02 ENCOUNTER — TELEPHONE (OUTPATIENT)
Dept: PRIMARY CARE | Facility: CLINIC | Age: 63
End: 2023-10-02
Payer: MEDICARE

## 2023-10-02 DIAGNOSIS — M25.50 ARTHRALGIA, UNSPECIFIED JOINT: ICD-10-CM

## 2023-10-02 NOTE — TELEPHONE ENCOUNTER
Jennifer Gonzalez [id number] [place] [address] Clinical Support Staff  Phone Number: [phone]     I have been having a lot of pain in my hands and joints. Was wondering if it would be feasible to get bloodwork for inflammation? Like sed and crp etc?  Of so could you please put orders in.    Thank you

## 2023-10-03 NOTE — TELEPHONE ENCOUNTER
Okay for labs as ordered.  Please let her know     Called patient, left message for return call.  Labs are ordered.

## 2023-10-03 NOTE — TELEPHONE ENCOUNTER
Spoke with patient, she is aware labs are available for her to have done.  She will go have them done with her .

## 2023-10-13 DIAGNOSIS — M19.90 OSTEOARTHRITIS, UNSPECIFIED OSTEOARTHRITIS TYPE, UNSPECIFIED SITE: ICD-10-CM

## 2023-10-13 RX ORDER — HYDROCODONE BITARTRATE AND IBUPROFEN 7.5; 2 MG/1; MG/1
1 TABLET, FILM COATED ORAL EVERY 6 HOURS PRN
Qty: 120 TABLET | Refills: 0 | Status: SHIPPED | OUTPATIENT
Start: 2023-10-13 | End: 2023-11-13 | Stop reason: SDUPTHER

## 2023-10-13 RX ORDER — HYDROCODONE BITARTRATE AND IBUPROFEN 7.5; 2 MG/1; MG/1
1 TABLET, FILM COATED ORAL EVERY 6 HOURS PRN
COMMUNITY
End: 2023-10-13 | Stop reason: SDUPTHER

## 2023-10-13 NOTE — TELEPHONE ENCOUNTER
----- Message from Jennifer Gonzalez sent at 10/13/2023  1:30 PM EDT -----  Regarding: Refill   Contact: [phone]  Generic vicoprofen is now available again.   Could I please get a refill it’s 7.5/200. #120  Please send that to [facility] in [place]   Thank you

## 2023-11-08 DIAGNOSIS — G20.A1 PARKINSON DISEASE (MULTI): ICD-10-CM

## 2023-11-08 RX ORDER — ROPINIROLE 1 MG/1
TABLET, FILM COATED ORAL
Qty: 180 TABLET | Refills: 0 | Status: SHIPPED | OUTPATIENT
Start: 2023-11-08 | End: 2024-02-26 | Stop reason: SDUPTHER

## 2023-11-13 DIAGNOSIS — M19.90 OSTEOARTHRITIS, UNSPECIFIED OSTEOARTHRITIS TYPE, UNSPECIFIED SITE: ICD-10-CM

## 2023-11-13 RX ORDER — HYDROCODONE BITARTRATE AND IBUPROFEN 7.5; 2 MG/1; MG/1
1 TABLET, FILM COATED ORAL EVERY 6 HOURS PRN
Qty: 56 TABLET | Refills: 0 | Status: SHIPPED | OUTPATIENT
Start: 2023-11-13 | End: 2023-11-27 | Stop reason: SDUPTHER

## 2023-11-13 NOTE — TELEPHONE ENCOUNTER
----- Message from Jennifer Gonzalez sent at 11/13/2023  7:08 AM EST -----  Regarding: Rx refill  Contact: [phone]  Could you please refill my vicoprofen 7.5/200 #120 at [facility]  please.   Thank you.   Have a good week

## 2023-11-25 ASSESSMENT — ENCOUNTER SYMPTOMS
BACK PAIN: 1
LEG PAIN: 0
TINGLING: 0
WEIGHT LOSS: 0
ABDOMINAL PAIN: 0
PARESIS: 0
PARESTHESIAS: 1
DYSURIA: 0
WEAKNESS: 0
FEVER: 0
NUMBNESS: 0
PERIANAL NUMBNESS: 0
BOWEL INCONTINENCE: 0
HEADACHES: 0

## 2023-11-27 ENCOUNTER — OFFICE VISIT (OUTPATIENT)
Dept: PRIMARY CARE | Facility: CLINIC | Age: 63
End: 2023-11-27
Payer: MEDICARE

## 2023-11-27 VITALS
RESPIRATION RATE: 18 BRPM | HEIGHT: 68 IN | OXYGEN SATURATION: 98 % | WEIGHT: 123.6 LBS | SYSTOLIC BLOOD PRESSURE: 120 MMHG | DIASTOLIC BLOOD PRESSURE: 70 MMHG | BODY MASS INDEX: 18.73 KG/M2 | TEMPERATURE: 97.5 F | HEART RATE: 75 BPM

## 2023-11-27 DIAGNOSIS — G35 MULTIPLE SCLEROSIS (MULTI): ICD-10-CM

## 2023-11-27 DIAGNOSIS — M54.2 NECK PAIN, CHRONIC: ICD-10-CM

## 2023-11-27 DIAGNOSIS — G89.29 NECK PAIN, CHRONIC: ICD-10-CM

## 2023-11-27 DIAGNOSIS — E78.2 MIXED HYPERLIPIDEMIA: ICD-10-CM

## 2023-11-27 DIAGNOSIS — R03.0 ELEVATED BLOOD PRESSURE READING: ICD-10-CM

## 2023-11-27 DIAGNOSIS — E06.3 HASHIMOTO'S DISEASE: ICD-10-CM

## 2023-11-27 DIAGNOSIS — M19.90 OSTEOARTHRITIS, UNSPECIFIED OSTEOARTHRITIS TYPE, UNSPECIFIED SITE: Primary | ICD-10-CM

## 2023-11-27 DIAGNOSIS — F51.01 PRIMARY INSOMNIA: ICD-10-CM

## 2023-11-27 PROCEDURE — 99213 OFFICE O/P EST LOW 20 MIN: CPT | Performed by: FAMILY MEDICINE

## 2023-11-27 RX ORDER — ZOLPIDEM TARTRATE 10 MG/1
10 TABLET ORAL NIGHTLY
Qty: 30 TABLET | Refills: 3 | Status: SHIPPED | OUTPATIENT
Start: 2023-11-27 | End: 2024-02-26 | Stop reason: SDUPTHER

## 2023-11-27 RX ORDER — HYDROCODONE BITARTRATE AND IBUPROFEN 7.5; 2 MG/1; MG/1
1 TABLET, FILM COATED ORAL EVERY 6 HOURS PRN
Qty: 120 TABLET | Refills: 0 | Status: SHIPPED | OUTPATIENT
Start: 2023-11-27 | End: 2023-12-26 | Stop reason: SDUPTHER

## 2023-11-27 NOTE — PROGRESS NOTES
"Subjective   Patient ID: Jennifer Gonzalez is a 63 y.o. female who presents for Pain, Hypertension, and Insomnia.  HPI    Patient presents for chronic pain. Is currently taking Vicoprofin. Rates the pain a 6.5-7/10 over the past 7 days. Reports that the medication gives 60-70% pain control/relief. OARRS reviewed today. CSA 2/28/23. Last took this morning      Patient in office being seen for a follow up on Insomnia. Currently taking AMBIEN, Last took LAST NIGHT. Medication agreement signed on 2/28/23. Reports that the medication is working 8/10. 80% managed with the medication. States there are no adverse effects.      HTN follow up  Denies chest pain,SOB, swelling, headaches, lightheadedness or dizziness.   Eats a generally healthy diet, Exercises.   Does not check BP at home.   Currently taking metoprolol     Taking current medications which were reviewed.  Problem list discussed.    Today she is accompanied by her .      Overall doing well.  Eating okay.  Staying active.    Has no other new problem /question.     ROS  Constitutional- No activity change. No appetite change.  Eyes- Denies vision changes.  Respiratory- No shortness of breath.  Cardiovascular- No palpitations. No chest pain.  GI- No nausea or vomiting. No diarrhea or constipation. Denies abdominal pain.  Musculoskeletal- Denies joint swelling.  Extremities- No edema.  Neurological- Denies headaches. Denies dizziness.  Skin- No rashes.  Psychiatric/Behavioral- Denies significant anxiety, or depressed mood.     Objective     /70   Pulse 75   Temp 36.4 °C (97.5 °F) (Temporal)   Resp 18   Ht 1.727 m (5' 8\")   Wt 56.1 kg (123 lb 9.6 oz)   SpO2 98%   BMI 18.79 kg/m²     Allergies   Allergen Reactions    Levofloxacin Hives, Other and Swelling     JOINT SWELLING    Morphine Itching    Pentazocine Other    Cephalexin Hives and Rash    Diphenhydramine Anxiety and Unknown    Penicillins Hives and Rash       Constitutional-- Well-nourished.  No " distress  Head- unremarkable.  Eyes- PERRL.  Conjunctiva normal.  Nose- Normal.  No rhinorrhea noted.  Throat- Oropharynx is clear and moist.  Neck- Supple with no thyromegaly.  No significant cervical adenopathy noted.  Pulmonary/Chest- Breath sounds normal with normal effort.  No wheezing.  Heart- Regular rate and rhythm.  No murmur.  Abdomen- Soft and non-tender.  No masses noted.  Musculoskeletal-chronic low back and neck pain exacerbated with range of motion  Extremities- No edema.   Neurological- Alert.  No noted deficits.  Skin- Warm.   Psychiatric/Behavioral- Mood and affect normal.    Assessment/Plan   1. Osteoarthritis, unspecified osteoarthritis type, unspecified site  HYDROcodone-ibuprofen (Vicoprofen) 7.5-200 mg tablet      2. Primary insomnia  zolpidem (Ambien) 10 mg tablet      3. Hashimoto's disease        4. Mixed hyperlipidemia        5. Multiple sclerosis (CMS/HCC)        6. Elevated blood pressure reading        7. Neck pain, chronic  HYDROcodone-ibuprofen (Vicoprofen) 7.5-200 mg tablet             Long talk. Treatment options reviewed.  Chronic pain stable and under good control.  Understands to use the least amount of medication control her symptoms.    Thyroid stable  Mood stable  Reviewed most recent lab work with patient.  Advised patient to remain up to date on routine maintenance and health screening.      Educated on insomnia and sleep hygiene.    Continue and take your medications as prescribed.    Health Maintenance issues discussed.    Importance of healthy diet and regular exercise regimen discussed..    Follow-up as instructed or sooner if any problems or symptoms do not resolve as expected.      Scribe Attestation  By signing my name below, ITanja Scribe   attest that this documentation has been prepared under the direction and in the presence of Miguel Magaña MD.

## 2023-12-13 ENCOUNTER — TELEPHONE (OUTPATIENT)
Dept: PRIMARY CARE | Facility: CLINIC | Age: 63
End: 2023-12-13

## 2023-12-13 DIAGNOSIS — N20.0 KIDNEY STONE: Primary | ICD-10-CM

## 2023-12-13 RX ORDER — NITROFURANTOIN 25; 75 MG/1; MG/1
100 CAPSULE ORAL 2 TIMES DAILY
Qty: 14 CAPSULE | Refills: 0 | Status: SHIPPED | OUTPATIENT
Start: 2023-12-13 | End: 2023-12-20

## 2023-12-13 RX ORDER — TAMSULOSIN HYDROCHLORIDE 0.4 MG/1
0.4 CAPSULE ORAL DAILY
Qty: 30 CAPSULE | Refills: 0 | Status: SHIPPED | OUTPATIENT
Start: 2023-12-13 | End: 2024-12-12

## 2023-12-13 NOTE — TELEPHONE ENCOUNTER
----- Message from Jennifer Gonzalez sent at 12/13/2023 12:28 PM EST -----  Regarding: Not sure what to do  Contact: [phone]  I know I have a kidney stone. I’ve been having really bad pain in my back and groin but I don’t know if it’s the kidney stone or my back hardware. Was supposed to see Dr Larson this morning but he got admitted to hospital. My pain meds are not touching this pain and as you know I’m taking care of mom and I need answers or something stronger for my pain until god allowing, Dr Larson to return.  Please help me figure this out. Going to ER is not an option. I can’t leave mom alone for that long

## 2023-12-20 ENCOUNTER — PROCEDURE VISIT (OUTPATIENT)
Dept: UROLOGY | Facility: CLINIC | Age: 63
End: 2023-12-20
Payer: MEDICARE

## 2023-12-20 VITALS
HEART RATE: 80 BPM | DIASTOLIC BLOOD PRESSURE: 72 MMHG | SYSTOLIC BLOOD PRESSURE: 147 MMHG | RESPIRATION RATE: 18 BRPM | WEIGHT: 123.68 LBS | BODY MASS INDEX: 18.81 KG/M2

## 2023-12-20 DIAGNOSIS — N20.0 NEPHROLITHIASIS: ICD-10-CM

## 2023-12-20 DIAGNOSIS — N39.0 URINARY TRACT INFECTION WITHOUT HEMATURIA, SITE UNSPECIFIED: ICD-10-CM

## 2023-12-20 DIAGNOSIS — D30.3 BENIGN BLADDER TUMOR: ICD-10-CM

## 2023-12-20 DIAGNOSIS — N35.12 POSTINFECTIVE URETHRAL STRICTURE IN FEMALE: Primary | ICD-10-CM

## 2023-12-20 LAB
POC APPEARANCE, URINE: CLEAR
POC BILIRUBIN, URINE: NEGATIVE
POC BLOOD, URINE: NEGATIVE
POC COLOR, URINE: YELLOW
POC GLUCOSE, URINE: NEGATIVE MG/DL
POC KETONES, URINE: NEGATIVE MG/DL
POC LEUKOCYTES, URINE: NEGATIVE
POC NITRITE,URINE: NEGATIVE
POC PH, URINE: 6 PH
POC PROTEIN, URINE: NEGATIVE MG/DL
POC SPECIFIC GRAVITY, URINE: 1.02
POC UROBILINOGEN, URINE: 0.2 EU/DL

## 2023-12-20 PROCEDURE — 81003 URINALYSIS AUTO W/O SCOPE: CPT | Performed by: UROLOGY

## 2023-12-20 PROCEDURE — 51798 US URINE CAPACITY MEASURE: CPT | Performed by: UROLOGY

## 2023-12-20 PROCEDURE — 51741 ELECTRO-UROFLOWMETRY FIRST: CPT | Performed by: UROLOGY

## 2023-12-20 PROCEDURE — 52281 CYSTOSCOPY AND TREATMENT: CPT | Performed by: UROLOGY

## 2023-12-20 PROCEDURE — 99214 OFFICE O/P EST MOD 30 MIN: CPT | Performed by: UROLOGY

## 2023-12-20 NOTE — PATIENT INSTRUCTIONS
Patient Discussion/Summary     Good to see you again. You were successfully dilated to 30 Wolof. You have noticed that you are no longer straining to urinate and you wish to maintain your regimen to 4 months.  Your ultrasound shows a small 3 mm stone which is nonobstructing in the right kidney.  However you continue to have right flank and lower abdominal pain on that side so we will obtain a renal colic CAT scan.  We will also order your urine studies which were not completed.  Your bladder does show improvement.       This note was created with voice-recognition software and was not corrected for typographical or grammatical errors.

## 2023-12-20 NOTE — LETTER
December 20, 2023     Miguel Magaña MD  [address] [facility] [address]    Patient: Jennifer Gonzalez   YOB: 1960   Date of Visit: 12/20/2023       Dear Dr. Miguel Magaña MD:    Thank you for referring Jennifer Gonzalez to me for evaluation. Below are my notes for this consultation.  If you have questions, please do not hesitate to call me. I look forward to following your patient along with you.       Sincerely,     Saeid Larson MD      CC: No Recipients  ______________________________________________________________________________________      Provider Impressions     63 year-old white female originally seen on 08/17/04. Patient had a long history of URINARY TRACT INFECTIONS, DYSURIA, URETHRAL STRICTURES. No family history of breast or prostate cancer. The patient does smoke cigarettes on a social basis.     In 2002, patient underwent a total abdominal hysterectomy with bilateral salpingo-oophorectomy (GLORIA/ BSO) for UTERINE CANCER. She also has developed a CYSTOCELE grade 2 and a RECTOCELE grade 2. She was receiving CYSTOSCOPIES with URETHRAL DILATATIONS UNDER ANESTHESIA every 3-6 months.     Then, the patient DISAPPEARED.     She returned in 2014 with RHEUMATOID ARTHRITIS, THYROID DISEASE, and other medical problems.      On 06/05/14 we performed a CYSTOSCOPY with URETHRAL DILATATION under anesthesia. She strictured and created scar tissue within 3 weeks. We are attempting to maintain her in the office setting.She is also titrating her Flomax doses.     10/01/14 CYSTOSCOPY with URETHRAL DILATATION to 30 Cypriot. Patient had been noticing a decreased flow stream for the past 2 days prior.     11/04/14 CYSTOSCOPY with URETHRAL DILATATION to 30 Cypriot. No bleeding. Patient states that she was straining to urinate over the past 4 days     12/03/14, patient recently diagnosed with MEDITERRANEAN FEVER. She states now for the past 2 days she said significant DYSURIA and straining to urinate. She  was successfully DILATED to 30 Ethiopian with minimal bleeding. No family history of breast or prostate cancer. Presently an occasional cigarette smoker.     01/07/14, patient is now being treated with colchicine. She states that for the first time, she has not had straining towards the end of her stream. 12 Ethiopian STRICTURE successfully DILATED to 30 Ethiopian. However, the lower half of the bladder was erythematous. This is surprising in light of the fact that the patient had no symptoms. Fortunately, her postvoid residual is only 16 cc, a dramatic decrease from the 2-300 cc PVRs.     03/27/15, May 8 2015, 06/08/15, successful DILATION of to 30 Ethiopian, normal-appearing bladder, some difficulty,      03/27/15, May 8 2015, 06/08/15, 08/31/15 successful DILATION to 30 Ethiopian, normal-appearing bladder, some difficulty, flowrate 29 cc/s, PVR 0 cc.      COMPUTERS DOWN  COMPUTERS DOWN  COMPUTERS DOWN      10/05/15, patient states that she is noticed a decreased flow of stream over the past 5 days. This is accompanied with significant NOCTURIA. Successfully DILATED to 30 Ethiopian today. Good flow rate of 16 cc/s. She will continue on the five-week regimen.     01/04/16 successful DILATATION to 30 Ethiopian with pain today. She noticed that she is spraying when she urinates. We will increase to 6 weeks at this time. Flow of 7 with a PVR 0.     02/15/16, successful DILATATION to 30 Ethiopian. Patient has been having some FREQUENCY over the last week and NOCTURIA. There was minimal pain. Some ERYTHEMA along the base of the bladder. We will continue at a 6 week regimen. Flow rate of 32 with a PVR of 90     03/28/16, successful DILATATION to 30 Ethiopian. Less FREQUENCY. Only 3 days of SPRAYING during urination prior to today's DILATATION. Minimal pain. Trigone and base are ERYTHEMATOUS. Patient states that she has discomfort with the first void of the day. We will continue at a 6 week regimen. Flow rate of 14 with a PVR of 47. She will be  maintaining Flomax.     05/03/16, successful CYSTOSCOPY with URETHRAL DILATATION to 30 Argentine. Once again, the patient states she was experiencing SUPRAPUBIC PAIN and spraying with urination over the past 3 days prior to this procedure. She wishes to stay at a 6 week regimen. Flow rate of 13 with a PVR 13     06/13/16, successful CYSTOSCOPY with URETHRAL DILATATION of to 30 Argentine. Patient had minimal pain, still mild diffuse ERYTHEMA. Flow rate of 34 with a PVR of 0. Patient states that over the last 4 days she began spraying her urine. She wishes to maintain a 6 week regimen.     07/27/16, successful CYSTOSCOPY with URETHRAL DILATATION to 30 Argentine with pain. The patient states that she has been STRAINING to urinate. Flow rate of 9 with a PVR of 0. She will return in 6 weeks and also have her yearly laboratory testing.     09/12/16, all urine studies are normal. Successful CYSTOSCOPY with URETHRAL DILATATION at 30 Argentine. The patient states that she is had a spraying stream and disuria. She is 1 week late for her dilation due to scheduling. Flow rate of 11 with a PVR of 0. We will maintain a 6 week regimen.     10/18/16, CYSTOSCOPY with URETHRAL DILATATION to 30 Argentine. Significantly more pain today than routine. However, virtually no erythema. Flow rate of 13 with a PVR of 0. We will maintain the 6 week regimen. She will continue on Flomax.     11/29/16, CYSTOSCOPY with URETHRAL DILATATION to 30 Argentine. Somewhat painful. The patient states that over the last 48 hours she has been straining severely to urinate. She will continue on Flomax. PVR 56. She will return in 6 weeks.     01/11/17, patient states that over the last 4 days she has had bladder discomfort, straining to urinate, and bladder spasms during the nighttime hours. She does not wish to increase her time. Wants to say at 6 weeks interval. Successful DILATION to 30 Argentine with minimal pain and bleeding. Flow rate of 24 with a PVR of 136.     02/21/17,  patient states she is been straining to urinate over the past 3 days. She is also complaining of intermittent vaginal pain. She had successful CYSTOSCOPY WITH URETHRAL DILATATION to 30 Macanese with discomfort. She wants to maintain the 6 week schedule. Flow rate of 9 with a PVR of 0. I will refer her to Dr. Barkley for evaluation of her GYN issues. Today on exam, there was significant discomfort along the posterior vaginal wall.     04/04/17, patient saw Dr. Barkley in consultation and recommended PELVIC PHYSICAL THERAPY and Botox. Patient will consider that alternative treatment and let him know of her decision. She was DILATED FOR A URETHRAL STRICTURE today with a CYSTOSCOPY WITH URETHRAL DILATATION to 30 Macanese. Moderate discomfort. The bladder was only mildly erythematous. Flow rate of 9 with a PVR of 12. She wishes to keep a 6 week schedule. She will continue on Flomax.     05/19/17, successful CYSTOSCOPY WITH URETHRAL DILATATION to 30 Macanese. Flow rate of 20 with a PVR of 86. Patient states that over the last 3 days she has been straining to urinate. Continue Flomax. She will return in 6 weeks.     06/28/17, successful CYSTOSCOPY WITH URETHRAL DILATATION at 30 Macanese with minimal pain. Flow rate of 24 with a PVR 66. Patient states that over the last 3 days she has been straining to urinate with discomfort. She will continue Flomax. She will return in 6 weeks and also obtain her yearly labs.     08/15/17, successful CYSTOSCOPY WITH URETHRAL DILATATION to 30 Macanese moderate pain. Flow rate of 7 with a PVR of 80. Patient has been straining for the last week to urinate. She will continue on Flomax. Urinalysis shows 50 red blood cells, urine cultures negative, urine cytology is negative. We will continue Flomax and urethral dilatations every 6 weeks. When she returns for her next visit we will order a renal ultrasound with respect to her hematuria     09/26/17, successful CYSTOSCOPY WITH URETHRAL DILATATION to 30  Mauritian with moderate pain. Flow rate of 3 with a PVR 14. Patient continues to strain just one week prior to dilatation. She continues on Flomax. Renal ultrasound was completely negative. We will see her again in 6 weeks.     11/08/17, successful CYSTOSCOPY WITH URETHRAL DILATATION to 30 Mauritian with mild to moderate pain. Flow rate of 17 with a PVR of 0. She has been straining over the last 3 days to urinate. She continues on Flomax. She strongly wants to continue on a 6 week regimen. She notified us that her insurance is changing and I given her recommendations for new urologist that we will be on her planned for next year.     12/16/17, successful CYSTOSCOPY WITH URETHRAL DILATATION to 30 Mauritian with mild to moderate pain. Flow rate of 6 with a PVR of 23. Again, she has been straining over the last 4-5 days with urination. She continues on Flomax. She is scheduled for major back surgery with Dr. Maldonado at the main campus [facility] immediately following [place]. Due to change in insurance, she will be continuing her urologic care with Dr. Schroeder. We will send all pertinent information to him.     05/14/18, in office urethral dilation to 30 Mauritian. Patient returns complaining of significant urgency and frequency. Patient complains of incomplete emptying. Office visit with urethral dilatation performed with minimal pain. She wishes to return in 3 months.     08/22/18, in office urethral dilation to 30 Mauritian. Patient returns complaining of straining on urination for the last week.. Patient complains of incomplete emptying. Office visit with urethral dilatation performed with minimal pain. She wishes to return in 3 months.     02/12/19, successful cystoscopy with urethral dilatation to 30 Mauritian of a very dense stricture, with pain and bleeding. Trigone and posterior bladder quite erythematous. Patient has changed her insurance and is able to return in wishes to initiate a 2 month schedule at this time. She will  discontinue her Flomax to due to side effects.     04/15/19, successful cystoscopy with urethral dilatation to 30 Chilean of a very dense stricture, with pain. The bladder has patchy erythema.. Patient wishes to initiate a 2 month schedule at this time.     06/14/19, successful cystoscopy with urethral dilatation to 30 Chilean of a urethral stricture. Minimal pain. The erythema within the bladder has improved. Patient will continue on a 2 month schedule.     09/03/19, successful cystoscopy with urethral dilatation at 30 Chilean of a urethral stricture. Minimal pain. Mild erythema within the trigone and posterior bladder. Patient states she has been straining over the last 10 days and wishes to maintain her 2 month schedule for another year. Urine culture was positive for Klebsiella sensitive to Bactrim. Cytology is negative.     01/21/20, successful cystoscopy with urethral dilatation to 30 Chilean of a urethral stricture. Minimal pain. Posterior bladder has moderate erythema. Patient complains of suprapubic pain however she is late for her schedule. She will maintain a 2 month schedule.     03/16/20, successful cystoscopy with urethral dilatation to 30 Chilean of a urethral stricture. Minimal pain with no bleeding. Trigone and bladder base has minimal erythema. Patient now complains of bilateral lower quadrant pain. Vaginal exam did not reproduce the pain. I recommended that she speak to her primary care physician or gastro-and neurologist. She does not have any reproductive organs remaining. We will see her again in 2 months.     05/18/20, successful cystoscopy with urethral dilatation at 30 Chilean of a tight urethral stricture. There was minimal pain and no bleeding. Once again the trigone and bladder base has minimal erythema. She was complaining of suprapubic pain which could not be reproduced by vaginal exam however abdominal exam along her Pfannenstiel incision did reproduce the discomfort. She will return in 2  months.     09/25/20, successful cystoscopy with urethral dilatation to 30 Peruvian of a tight urethral stricture. Minimal pain with no bleeding. Erythema in the trigone and posterior bladder with a clear line of demarcation. No further suprapubic pain. She wishes to expand to a 3 month program.     11/23/20, successful cystoscopy with urethral dilatation to 30 Peruvian of a urethral stricture. Minimal pain and no bleeding. Once again, erythema in the trigone and bladder base with a clear line of demarcation. She states she has been straining the last 2 weeks to urinate. She wishes to maintain a 3 month schedule.     February 26, 2021, successful cystoscopy with urethral dilatation at 30 Peruvian of a dense urethral stricture. Moderate pain and no bleeding. Bladder based erythema has resolved however the trigone is still inflamed. Once again, she has been straining the last 2 to 3 weeks with urination, has frequency every 35 to 45 minutes, and wishes to maintain a 3-month schedule.     May 25, 2021, successful cystoscopy with urethral dilatation to 30 Peruvian of a dense urethral stricture. Once again, moderate pain and no bleeding. Erythema in the trigone and posterior bladder. She states that she feels that she has been straining with obstruction to urinate recently. She will return in 3 months.     August 20, 2021, successful cystoscopy with urethral dilatation of a dense stricture to 30 Peruvian. Moderate pain. Mild erythema posteriorly and moderate erythema at the trigone. Urinalysis shows small blood, urine culture no growth, urine cytology is lacking atypia. I was suggesting increasing her regimen to 4 months, however the patient states that over the last 2 weeks she has been double voiding and now for the first time in years, she is experiencing nocturia x2 with urinary straining. She wishes to maintain her 3-month regimen.     November 23, 2021, successful cystoscopy with urethral dilatation of a dense urethral  stricture to 30 Slovak. Moderate pain. The only erythema is posteriorly which is moderate. Patient has been straining to urinate for the past week and double voiding. She wishes to maintain her 3-month schedule.     February 23, 2022, successful cystoscopy with dilatation of a dense upward sloping urethral stricture to 30 Slovak. Moderate pain. Again, posterior erythema is identified. She wishes to maintain her 3-month schedule.     May 23, 2022, successful cystoscopy with dilatation of a dense upward sloping urethral stricture to 30 Slovak. Minimal pain. Erythema in the trigone and bladder base. Patient is complaining of recurrent right inguinal hernia tender to palpation. I will refer her to Dr. Baldwin. She will return to me in 3 months.     7/8/2022, office visit, Dr. Gricelda Baldwin, general surgery. Evaluation of recurrent inguinal hernias. CAT scan does not reveal any recurrence of hernias and the ilioinguinal nerves are intact.     August 5, 2022, successful cystoscopy with dilatation of an upward sloping urethral stricture to 30 Slovak. Minimal discomfort. Once again a patch of erythema just lateral to the left ureteral orifice where previous resected bladder tumor site was seen. No evidence of recurrent tumor or stones. Urine cytology is negative for malignant cells. Urine culture no growth. Urinalysis shows 6 red blood cells. Patient complained of double voiding, incomplete emptying and the need to press her suprapubic region to initiate her stream. This is over the last 4 days. She does not want to expand to a 4-month regimen.     November 21, 2022, successful cystoscopy with dilatation of an upward sloping urethral stricture to 30 Slovak with minimal pain. The well-healed previous bladder tumor site just lateral to the left ureteral orifice was seen once again. No new tumors or stones. She wishes to maintain a 3-month regimen.     May 5, 2023, successful cystoscopy with dilatation of an upward sloping  urethral stricture to 30 Andorran with moderate discomfort. Erythema is concentrated posteriorly with a clear line of demarcation. Previous bladder tumors sites well-healed seen just lateral to the left ureteral orifice. She has been caring for her  who has been suffering from pulmonary issues leading to septic shock. She will return in 3 months.     August 7, 2023, successful cystoscopy with dilatation of an upward sloping urethral stricture to 30 Andorran with minimal discomfort and no bleeding. Once again erythema is identified posteriorly. Urinalysis shows 20 red blood cells. Urine culture no growth. Urine cytology is negative for malignant cells. We will order a renal and bladder ultrasound before her next appointment. We will also repeat her urinalysis and urine culture. She is no longer straining and we agreed to expand to 4 months regimen.    FLOMAX allergy    December 20, 2023, successful cystoscopy with dilatation of an upward sloping urethral stricture to 30 Andorran with minimal pain, no bleeding.  Erythema is again confined posteriorly.  Renal ultrasound shows a 3 mm nonobstructing right renal calculus.  Patient is complaining of right flank and right groin pain.  We will order a renal colic CAT scan now as well as her urine studies which she did not complete.  She wishes to maintain her 4-month schedule.     PLAN:     #1 patient will return in 4 months for cystoscopy with urethral dilatation at 30 Andorran. She will receive MACRODANTIN 50 mg PROPHYLAXIS 4 doses. She will obtain urine studies, renal and bladder ultrasound also.     #2 in August 2024 with urine studies. Consider expanding to 5 months at that time.    Physical Exam  Vitals and nursing note reviewed. Exam conducted with a chaperone present.   Constitutional:       Appearance: Normal appearance.   HENT:      Head: Normocephalic and atraumatic.   Pulmonary:      Effort: Pulmonary effort is normal.   Abdominal:      Palpations: Abdomen is  soft.      Tenderness: There is right CVA tenderness.   Genitourinary:     General: Normal vulva.      Vagina: No vaginal discharge.   Musculoskeletal:         General: Normal range of motion.      Cervical back: Normal range of motion and neck supple.   Neurological:      General: No focal deficit present.      Mental Status: She is alert and oriented to person, place, and time.   Psychiatric:         Mood and Affect: Mood normal.         Behavior: Behavior normal.         This note was created with voice-recognition software and was not corrected for typographical or grammatical errors.

## 2023-12-20 NOTE — PROGRESS NOTES
"Patient ID: Gunnar Gonzalez is a 63 y.o. female.  Pt denies any pain today but does have intermittent right flank pain that radiates to lower back. States has not had any recent hospital admits or surgeries since their last visit. Denies any concerns about falling or safety. JML      Procedures  Pt took macrodantin 50mg po as prescribed  Anesthesia: Local 2% Lidocaine  Instruments: 6F flexible disposable cystoscope, female dilators    Pt brought to procedure room and placed in dorsal lithotomy position. Pt draped and prepped in normal sterile fashion. 5ml lidocaine instilled into urethral meatus and 5ml instilled into vagina. Pt tolerated well.    I was present as chaperone for the entirety of the procedure   Irasema Ulloa  Cystoscopy performed by Dr. Saeid Larson  Bedside \"Time Out\" Verification   Today's Date: 12/20/2023 . I attest that this time out verification took place prior to the procedure.   Procedure: cysto   RN/LPN/MA:RADHA   Provider: [facility].   Verified By: RN/LPN/MA,  GUNNARTAMMY GONZALEZ      and Provider.   Prior to the start of the procedure a time out was taken and the following were verified: the identity of the patient using two patient identifiers, the correct procedure, the correct site marked as indicated, the correct positioning for the patient and the correct equipment was obtained.   Cystoscopy - female  GUNNAR GONZALEZ     identified using two (2) forms of identification.   Procedure: diagnostic cystourethroscopy.  Procedure Note: Time Started: 1:30PM Time Completed: 1:45 PM  Indications for procedure: irritable voiding symptoms.   Discussed with patient: Risks, benefits, and alternative were discussed in detail. Patient appears to understand and agrees to proceed. Patient has signed the procedure consent form.    CYSTOSCOPY:    Cystoscopy today once again reveals an upward sloping urethral stricture dilated to 30 Bruneian with minimal discomfort.  Erythema confined posteriorly.  Flow rate 4 cc/s, " total volume 24 cc, PVR 3 cc

## 2023-12-20 NOTE — PROGRESS NOTES
Provider Impressions     63 year-old white female originally seen on 08/17/04. Patient had a long history of URINARY TRACT INFECTIONS, DYSURIA, URETHRAL STRICTURES. No family history of breast or prostate cancer. The patient does smoke cigarettes on a social basis.     In 2002, patient underwent a total abdominal hysterectomy with bilateral salpingo-oophorectomy (GLORIA/ BSO) for UTERINE CANCER. She also has developed a CYSTOCELE grade 2 and a RECTOCELE grade 2. She was receiving CYSTOSCOPIES with URETHRAL DILATATIONS UNDER ANESTHESIA every 3-6 months.     Then, the patient DISAPPEARED.     She returned in 2014 with RHEUMATOID ARTHRITIS, THYROID DISEASE, and other medical problems.      On 06/05/14 we performed a CYSTOSCOPY with URETHRAL DILATATION under anesthesia. She strictured and created scar tissue within 3 weeks. We are attempting to maintain her in the office setting.She is also titrating her Flomax doses.     10/01/14 CYSTOSCOPY with URETHRAL DILATATION to 30 Egyptian. Patient had been noticing a decreased flow stream for the past 2 days prior.     11/04/14 CYSTOSCOPY with URETHRAL DILATATION to 30 Egyptian. No bleeding. Patient states that she was straining to urinate over the past 4 days     12/03/14, patient recently diagnosed with MEDITERRANEAN FEVER. She states now for the past 2 days she said significant DYSURIA and straining to urinate. She was successfully DILATED to 30 Egyptian with minimal bleeding. No family history of breast or prostate cancer. Presently an occasional cigarette smoker.     01/07/14, patient is now being treated with colchicine. She states that for the first time, she has not had straining towards the end of her stream. 12 Egyptian STRICTURE successfully DILATED to 30 Egyptian. However, the lower half of the bladder was erythematous. This is surprising in light of the fact that the patient had no symptoms. Fortunately, her postvoid residual is only 16 cc, a dramatic decrease from the 2-300  cc PVRs.     03/27/15, May 8 2015, 06/08/15, successful DILATION of to 30 Australian, normal-appearing bladder, some difficulty,      03/27/15, May 8 2015, 06/08/15, 08/31/15 successful DILATION to 30 Australian, normal-appearing bladder, some difficulty, flowrate 29 cc/s, PVR 0 cc.      COMPUTERS DOWN  COMPUTERS DOWN  COMPUTERS DOWN      10/05/15, patient states that she is noticed a decreased flow of stream over the past 5 days. This is accompanied with significant NOCTURIA. Successfully DILATED to 30 Australian today. Good flow rate of 16 cc/s. She will continue on the five-week regimen.     01/04/16 successful DILATATION to 30 Australian with pain today. She noticed that she is spraying when she urinates. We will increase to 6 weeks at this time. Flow of 7 with a PVR 0.     02/15/16, successful DILATATION to 30 Australian. Patient has been having some FREQUENCY over the last week and NOCTURIA. There was minimal pain. Some ERYTHEMA along the base of the bladder. We will continue at a 6 week regimen. Flow rate of 32 with a PVR of 90     03/28/16, successful DILATATION to 30 Australian. Less FREQUENCY. Only 3 days of SPRAYING during urination prior to today's DILATATION. Minimal pain. Trigone and base are ERYTHEMATOUS. Patient states that she has discomfort with the first void of the day. We will continue at a 6 week regimen. Flow rate of 14 with a PVR of 47. She will be maintaining Flomax.     05/03/16, successful CYSTOSCOPY with URETHRAL DILATATION to 30 Australian. Once again, the patient states she was experiencing SUPRAPUBIC PAIN and spraying with urination over the past 3 days prior to this procedure. She wishes to stay at a 6 week regimen. Flow rate of 13 with a PVR 13     06/13/16, successful CYSTOSCOPY with URETHRAL DILATATION of to 30 Australian. Patient had minimal pain, still mild diffuse ERYTHEMA. Flow rate of 34 with a PVR of 0. Patient states that over the last 4 days she began spraying her urine. She wishes to maintain a 6 week  regimen.     07/27/16, successful CYSTOSCOPY with URETHRAL DILATATION to 30 Guamanian with pain. The patient states that she has been STRAINING to urinate. Flow rate of 9 with a PVR of 0. She will return in 6 weeks and also have her yearly laboratory testing.     09/12/16, all urine studies are normal. Successful CYSTOSCOPY with URETHRAL DILATATION at 30 Guamanian. The patient states that she is had a spraying stream and disuria. She is 1 week late for her dilation due to scheduling. Flow rate of 11 with a PVR of 0. We will maintain a 6 week regimen.     10/18/16, CYSTOSCOPY with URETHRAL DILATATION to 30 Guamanian. Significantly more pain today than routine. However, virtually no erythema. Flow rate of 13 with a PVR of 0. We will maintain the 6 week regimen. She will continue on Flomax.     11/29/16, CYSTOSCOPY with URETHRAL DILATATION to 30 Guamanian. Somewhat painful. The patient states that over the last 48 hours she has been straining severely to urinate. She will continue on Flomax. PVR 56. She will return in 6 weeks.     01/11/17, patient states that over the last 4 days she has had bladder discomfort, straining to urinate, and bladder spasms during the nighttime hours. She does not wish to increase her time. Wants to say at 6 weeks interval. Successful DILATION to 30 Guamanian with minimal pain and bleeding. Flow rate of 24 with a PVR of 136.     02/21/17, patient states she is been straining to urinate over the past 3 days. She is also complaining of intermittent vaginal pain. She had successful CYSTOSCOPY WITH URETHRAL DILATATION to 30 Guamanian with discomfort. She wants to maintain the 6 week schedule. Flow rate of 9 with a PVR of 0. I will refer her to Dr. Barkley for evaluation of her GYN issues. Today on exam, there was significant discomfort along the posterior vaginal wall.     04/04/17, patient saw Dr. Barkley in consultation and recommended PELVIC PHYSICAL THERAPY and Botox. Patient will consider that alternative  treatment and let him know of her decision. She was DILATED FOR A URETHRAL STRICTURE today with a CYSTOSCOPY WITH URETHRAL DILATATION to 30 Bahamian. Moderate discomfort. The bladder was only mildly erythematous. Flow rate of 9 with a PVR of 12. She wishes to keep a 6 week schedule. She will continue on Flomax.     05/19/17, successful CYSTOSCOPY WITH URETHRAL DILATATION to 30 Bahamian. Flow rate of 20 with a PVR of 86. Patient states that over the last 3 days she has been straining to urinate. Continue Flomax. She will return in 6 weeks.     06/28/17, successful CYSTOSCOPY WITH URETHRAL DILATATION at 30 Bahamian with minimal pain. Flow rate of 24 with a PVR 66. Patient states that over the last 3 days she has been straining to urinate with discomfort. She will continue Flomax. She will return in 6 weeks and also obtain her yearly labs.     08/15/17, successful CYSTOSCOPY WITH URETHRAL DILATATION to 30 Bahamian moderate pain. Flow rate of 7 with a PVR of 80. Patient has been straining for the last week to urinate. She will continue on Flomax. Urinalysis shows 50 red blood cells, urine cultures negative, urine cytology is negative. We will continue Flomax and urethral dilatations every 6 weeks. When she returns for her next visit we will order a renal ultrasound with respect to her hematuria     09/26/17, successful CYSTOSCOPY WITH URETHRAL DILATATION to 30 Bahamian with moderate pain. Flow rate of 3 with a PVR 14. Patient continues to strain just one week prior to dilatation. She continues on Flomax. Renal ultrasound was completely negative. We will see her again in 6 weeks.     11/08/17, successful CYSTOSCOPY WITH URETHRAL DILATATION to 30 Bahamian with mild to moderate pain. Flow rate of 17 with a PVR of 0. She has been straining over the last 3 days to urinate. She continues on Flomax. She strongly wants to continue on a 6 week regimen. She notified us that her insurance is changing and I given her recommendations for new  urologist that we will be on her planned for next year.     12/16/17, successful CYSTOSCOPY WITH URETHRAL DILATATION to 30 Northern Irish with mild to moderate pain. Flow rate of 6 with a PVR of 23. Again, she has been straining over the last 4-5 days with urination. She continues on Flomax. She is scheduled for major back surgery with Dr. Maldonado at the main campus [facility] immediately following L[facility]. Due to change in insurance, she will be continuing her urologic care with Dr. Schroeder. We will send all pertinent information to him.     05/14/18, in office urethral dilation to 30 Northern Irish. Patient returns complaining of significant urgency and frequency. Patient complains of incomplete emptying. Office visit with urethral dilatation performed with minimal pain. She wishes to return in 3 months.     08/22/18, in office urethral dilation to 30 Northern Irish. Patient returns complaining of straining on urination for the last week.. Patient complains of incomplete emptying. Office visit with urethral dilatation performed with minimal pain. She wishes to return in 3 months.     02/12/19, successful cystoscopy with urethral dilatation to 30 Northern Irish of a very dense stricture, with pain and bleeding. Trigone and posterior bladder quite erythematous. Patient has changed her insurance and is able to return in wishes to initiate a 2 month schedule at this time. She will discontinue her Flomax to due to side effects.     04/15/19, successful cystoscopy with urethral dilatation to 30 Northern Irish of a very dense stricture, with pain. The bladder has patchy erythema.. Patient wishes to initiate a 2 month schedule at this time.     06/14/19, successful cystoscopy with urethral dilatation to 30 Northern Irish of a urethral stricture. Minimal pain. The erythema within the bladder has improved. Patient will continue on a 2 month schedule.     09/03/19, successful cystoscopy with urethral dilatation at 30 Northern Irish of a urethral stricture. Minimal pain. Mild erythema  within the trigone and posterior bladder. Patient states she has been straining over the last 10 days and wishes to maintain her 2 month schedule for another year. Urine culture was positive for Klebsiella sensitive to Bactrim. Cytology is negative.     01/21/20, successful cystoscopy with urethral dilatation to 30 Micronesian of a urethral stricture. Minimal pain. Posterior bladder has moderate erythema. Patient complains of suprapubic pain however she is late for her schedule. She will maintain a 2 month schedule.     03/16/20, successful cystoscopy with urethral dilatation to 30 Micronesian of a urethral stricture. Minimal pain with no bleeding. Trigone and bladder base has minimal erythema. Patient now complains of bilateral lower quadrant pain. Vaginal exam did not reproduce the pain. I recommended that she speak to her primary care physician or gastro-and neurologist. She does not have any reproductive organs remaining. We will see her again in 2 months.     05/18/20, successful cystoscopy with urethral dilatation at 30 Micronesian of a tight urethral stricture. There was minimal pain and no bleeding. Once again the trigone and bladder base has minimal erythema. She was complaining of suprapubic pain which could not be reproduced by vaginal exam however abdominal exam along her Pfannenstiel incision did reproduce the discomfort. She will return in 2 months.     09/25/20, successful cystoscopy with urethral dilatation to 30 Micronesian of a tight urethral stricture. Minimal pain with no bleeding. Erythema in the trigone and posterior bladder with a clear line of demarcation. No further suprapubic pain. She wishes to expand to a 3 month program.     11/23/20, successful cystoscopy with urethral dilatation to 30 Micronesian of a urethral stricture. Minimal pain and no bleeding. Once again, erythema in the trigone and bladder base with a clear line of demarcation. She states she has been straining the last 2 weeks to urinate. She wishes  to maintain a 3 month schedule.     February 26, 2021, successful cystoscopy with urethral dilatation at 30 [place]ome[place] of a dense urethral stricture. Moderate pain and no bleeding. Bladder based erythema has resolved however the trigone is still inflamed. Once again, she has been straining the last 2 to 3 weeks with urination, has frequency every 35 to 45 minutes, and wishes to maintain a 3-month schedule.     May 25, 2021, successful cystoscopy with urethral dilatation to 30 [place] of a dense urethral stricture. Once again, moderate pain and no bleeding. Erythema in the trigone and posterior bladder. She states that she feels that she has been straining with obstruction to urinate recently. She will return in 3 months.     August 20, 2021, successful cystoscopy with urethral dilatation of a dense stricture to 30 S[place] Tome[place]. Moderate pain. Mild erythema posteriorly and moderate erythema at the trigone. Urinalysis shows small blood, urine culture no growth, urine cytology is lacking atypia. I was suggesting increasing her regimen to 4 months, however the patient states that over the last 2 weeks she has been double voiding and now for the first time in years, she is experiencing nocturia x2 with urinary straining. She wishes to maintain her 3-month regimen.     November 23, 2021, successful cystoscopy with urethral dilatation of a dense urethral stricture to 30 [place]ome[place]. Moderate pain. The only erythema is posteriorly which is moderate. Patient has been straining to urinate for the past week and double voiding. She wishes to maintain her 3-month schedule.     February 23, 2022, successful cystoscopy with dilatation of a dense upward sloping urethral stricture to 30 [place]ome[place]. Moderate pain. Again, posterior erythema is identified. She wishes to maintain her 3-month schedule.     May 23, 2022, successful cystoscopy with dilatation of a dense upward sloping urethral stricture to 30 [place]. Minimal pain. Erythema in the trigone and  bladder base. Patient is complaining of recurrent right inguinal hernia tender to palpation. I will refer her to Dr. Baldwin. She will return to me in 3 months.     7/8/2022, office visit, Dr. Gricelda Baldwin, general surgery. Evaluation of recurrent inguinal hernias. CAT scan does not reveal any recurrence of hernias and the ilioinguinal nerves are intact.     August 5, 2022, successful cystoscopy with dilatation of an upward sloping urethral stricture to 30 Bahamian. Minimal discomfort. Once again a patch of erythema just lateral to the left ureteral orifice where previous resected bladder tumor site was seen. No evidence of recurrent tumor or stones. Urine cytology is negative for malignant cells. Urine culture no growth. Urinalysis shows 6 red blood cells. Patient complained of double voiding, incomplete emptying and the need to press her suprapubic region to initiate her stream. This is over the last 4 days. She does not want to expand to a 4-month regimen.     November 21, 2022, successful cystoscopy with dilatation of an upward sloping urethral stricture to 30 Bahamian with minimal pain. The well-healed previous bladder tumor site just lateral to the left ureteral orifice was seen once again. No new tumors or stones. She wishes to maintain a 3-month regimen.     May 5, 2023, successful cystoscopy with dilatation of an upward sloping urethral stricture to 30 Bahamian with moderate discomfort. Erythema is concentrated posteriorly with a clear line of demarcation. Previous bladder tumors sites well-healed seen just lateral to the left ureteral orifice. She has been caring for her  who has been suffering from pulmonary issues leading to septic shock. She will return in 3 months.     August 7, 2023, successful cystoscopy with dilatation of an upward sloping urethral stricture to 30 Bahamian with minimal discomfort and no bleeding. Once again erythema is identified posteriorly. Urinalysis shows 20 red blood cells. Urine  culture no growth. Urine cytology is negative for malignant cells. We will order a renal and bladder ultrasound before her next appointment. We will also repeat her urinalysis and urine culture. She is no longer straining and we agreed to expand to 4 months regimen.    FLOMAX allergy    December 20, 2023, successful cystoscopy with dilatation of an upward sloping urethral stricture to 30 Macedonian with minimal pain, no bleeding.  Erythema is again confined posteriorly.  Renal ultrasound shows a 3 mm nonobstructing right renal calculus.  Patient is complaining of right flank and right groin pain.  We will order a renal colic CAT scan now as well as her urine studies which she did not complete.  She wishes to maintain her 4-month schedule.     PLAN:     #1 patient will return in 4 months for cystoscopy with urethral dilatation at 30 Macedonian. She will receive MACRODANTIN 50 mg PROPHYLAXIS 4 doses. She will obtain urine studies, renal and bladder ultrasound also.     #2 in August 2024 with urine studies. Consider expanding to 5 months at that time.    Physical Exam  Vitals and nursing note reviewed. Exam conducted with a chaperone present.   Constitutional:       Appearance: Normal appearance.   HENT:      Head: Normocephalic and atraumatic.   Pulmonary:      Effort: Pulmonary effort is normal.   Abdominal:      Palpations: Abdomen is soft.      Tenderness: There is right CVA tenderness.   Genitourinary:     General: Normal vulva.      Vagina: No vaginal discharge.   Musculoskeletal:         General: Normal range of motion.      Cervical back: Normal range of motion and neck supple.   Neurological:      General: No focal deficit present.      Mental Status: She is alert and oriented to person, place, and time.   Psychiatric:         Mood and Affect: Mood normal.         Behavior: Behavior normal.         This note was created with voice-recognition software and was not corrected for typographical or grammatical errors.

## 2023-12-26 DIAGNOSIS — M54.2 NECK PAIN, CHRONIC: ICD-10-CM

## 2023-12-26 DIAGNOSIS — M19.90 OSTEOARTHRITIS, UNSPECIFIED OSTEOARTHRITIS TYPE, UNSPECIFIED SITE: ICD-10-CM

## 2023-12-26 DIAGNOSIS — G89.29 NECK PAIN, CHRONIC: ICD-10-CM

## 2023-12-26 RX ORDER — HYDROCODONE BITARTRATE AND IBUPROFEN 7.5; 2 MG/1; MG/1
1 TABLET, FILM COATED ORAL EVERY 6 HOURS PRN
Qty: 120 TABLET | Refills: 0 | Status: SHIPPED | OUTPATIENT
Start: 2023-12-26 | End: 2024-01-22 | Stop reason: SDUPTHER

## 2023-12-26 NOTE — TELEPHONE ENCOUNTER
----- Message from Jennifer Gonzalez sent at 12/26/2023  8:42 AM EST -----  Regarding: Refill  Contact: [phone]  Could you please refill my vicoprofen 7.5/200 to [facility]   I saw Dr. Larson and he had me schedule for a CT scan of abdomen and Pelvis to figure out this pain. It’s scheduled for next week   Thank you.

## 2024-01-03 ENCOUNTER — HOSPITAL ENCOUNTER (OUTPATIENT)
Dept: RADIOLOGY | Facility: HOSPITAL | Age: 64
Discharge: HOME | End: 2024-01-03
Payer: MEDICARE

## 2024-01-03 DIAGNOSIS — D30.3 BENIGN BLADDER TUMOR: ICD-10-CM

## 2024-01-03 DIAGNOSIS — N20.0 NEPHROLITHIASIS: ICD-10-CM

## 2024-01-03 PROCEDURE — 74176 CT ABD & PELVIS W/O CONTRAST: CPT | Performed by: RADIOLOGY

## 2024-01-03 PROCEDURE — 74176 CT ABD & PELVIS W/O CONTRAST: CPT

## 2024-01-22 DIAGNOSIS — M54.2 NECK PAIN, CHRONIC: ICD-10-CM

## 2024-01-22 DIAGNOSIS — M19.90 OSTEOARTHRITIS, UNSPECIFIED OSTEOARTHRITIS TYPE, UNSPECIFIED SITE: ICD-10-CM

## 2024-01-22 DIAGNOSIS — G89.29 NECK PAIN, CHRONIC: ICD-10-CM

## 2024-01-22 PROBLEM — L98.8 RHYTIDES: Status: ACTIVE | Noted: 2017-02-14

## 2024-01-22 PROBLEM — Z86.69 HISTORY OF CATARACT: Status: ACTIVE | Noted: 2024-01-22

## 2024-01-22 PROBLEM — B35.1 ONYCHOMYCOSIS: Status: ACTIVE | Noted: 2024-01-22

## 2024-01-22 PROBLEM — L72.0 EIC (EPIDERMAL INCLUSION CYST): Status: ACTIVE | Noted: 2017-02-14

## 2024-01-22 PROBLEM — Z86.19 HISTORY OF INFECTIOUS DISEASE: Status: ACTIVE | Noted: 2024-01-22

## 2024-01-22 PROBLEM — R06.83 SNORING: Status: ACTIVE | Noted: 2024-01-22

## 2024-01-22 PROBLEM — E27.9 ADRENAL DISORDER (MULTI): Status: ACTIVE | Noted: 2020-11-02

## 2024-01-22 PROBLEM — M79.89 FOOT SWELLING: Status: ACTIVE | Noted: 2023-05-30

## 2024-01-22 PROBLEM — D18.00 ANGIOMA: Status: ACTIVE | Noted: 2017-02-14

## 2024-01-22 PROBLEM — M79.676 PAIN IN TOE: Status: ACTIVE | Noted: 2024-01-22

## 2024-01-22 PROBLEM — L03.312 CELLULITIS OF SKIN OF BACK: Status: ACTIVE | Noted: 2024-01-22

## 2024-01-22 PROBLEM — L82.1 SEBORRHEIC KERATOSES: Status: ACTIVE | Noted: 2017-02-14

## 2024-01-22 PROBLEM — L72.0 MILIA: Status: ACTIVE | Noted: 2017-02-14

## 2024-01-22 PROBLEM — R73.01 IMPAIRED FASTING BLOOD SUGAR: Status: ACTIVE | Noted: 2020-11-02

## 2024-01-22 PROBLEM — E16.1 REACTIVE HYPOGLYCEMIA: Status: ACTIVE | Noted: 2020-11-02

## 2024-01-22 PROBLEM — Q07.00 ARNOLD-CHIARI MALFORMATION (MULTI): Status: ACTIVE | Noted: 2017-08-09

## 2024-01-22 PROBLEM — R53.83 TIREDNESS: Status: ACTIVE | Noted: 2023-05-15

## 2024-01-22 PROBLEM — E55.9 VITAMIN D DEFICIENCY: Status: ACTIVE | Noted: 2020-11-02

## 2024-01-22 PROBLEM — I73.00 RAYNAUD'S PHENOMENON: Status: ACTIVE | Noted: 2024-01-22

## 2024-01-22 RX ORDER — CYANOCOBALAMIN (VITAMIN B-12) 50 MCG
1 TABLET ORAL DAILY
COMMUNITY

## 2024-01-22 RX ORDER — ALENDRONATE SODIUM 70 MG/1
70 TABLET ORAL
COMMUNITY

## 2024-01-22 RX ORDER — HYDROCODONE BITARTRATE AND IBUPROFEN 7.5; 2 MG/1; MG/1
1 TABLET, FILM COATED ORAL EVERY 6 HOURS PRN
Qty: 120 TABLET | Refills: 0 | Status: SHIPPED | OUTPATIENT
Start: 2024-01-22 | End: 2024-02-26 | Stop reason: SDUPTHER

## 2024-01-23 ENCOUNTER — TELEPHONE (OUTPATIENT)
Dept: PRIMARY CARE | Facility: CLINIC | Age: 64
End: 2024-01-23
Payer: MEDICARE

## 2024-01-23 NOTE — TELEPHONE ENCOUNTER
----- Message from Jennifer Gonzalez sent at 1/22/2024  7:12 PM EST -----  Regarding: Refill  Contact: [phone]  Did Dr Magaña get my message ?

## 2024-02-15 NOTE — PROGRESS NOTES
"Subjective   Patient ID: Jennifer Gonzalez is a 63 y.o. female who presents for Medicare Annual Wellness Visit Subsequent, Pain, and Insomnia.  HPI  Patient presents today for a Medicare AWV.    Following-up on MS. Is taking Vicoprofen 7.5-200 MG. States she has no concerns with this medication. Rates the pain a 6/10 over the past 7 days. Reports that the medication gives 100% pain control/relief. [place]AR[place] today. Controlled substance contract signed today. Last took it yesterday.    Also presents today for a follow-up on Insomnia. Is taking Ambien 10 MG. No issues with the medication. Rates the insomnia a 10/10 over the past 7 days. Reports that the medication gives 80% insomnia control/relief. [place]AR[place] reviewed today. Controlled substance contract signed today. Last took medication last night.        Taking current medications which were reviewed.  Problem list discussed.    Overall doing well.  Eating okay.  Staying active.    Has no other new problem /question.    ROS  Constitutional- No activity change. No appetite change.  Eyes- Denies vision changes.  Respiratory- No shortness of breath.  Cardiovascular- No palpitations. No chest pain.  GI- No nausea or vomiting. No diarrhea or constipation. Denies abdominal pain.  Musculoskeletal- Denies joint swelling.  Extremities- No edema.  Neurological- Denies headaches. Denies dizziness.  Skin- No rashes.  Psychiatric/Behavioral- Denies significant anxiety, or depressed mood.     Objective     /82 (BP Location: Right arm, Patient Position: Sitting, BP Cuff Size: Adult)   Pulse 80   Temp 37.3 °C (99.1 °F) (Temporal)   Ht 1.727 m (5' 8\")   Wt 58.5 kg (129 lb)   SpO2 99%   BMI 19.61 kg/m²     Allergies   Allergen Reactions    Tamsulosin Dizziness    Levofloxacin Hives, Other and Swelling     JOINT SWELLING    Morphine Itching    Pentazocine Other    Cephalexin Hives and Rash    Diphenhydramine Anxiety and Unknown    Penicillins Hives and Rash "       Constitutional-- Well-nourished.  No distress  Eyes- PERRL.  Conjunctiva normal.  Nose- Normal.  No rhinorrhea noted.  Throat- Oropharynx is clear and moist.  Neck- Supple with no thyromegaly.  No significant cervical adenopathy noted.  Pulmonary/Chest- Breath sounds normal with normal effort.  No wheezing.  Heart- Regular rate and rhythm.  No murmur.  Abdomen- Soft and non-tender.  No masses noted.  Musculoskeletal- Normal ROM.  No significant joint swelling.  Chronic low back and neck pain exacerbated with range of motion.  Extremities- No edema.   Neurological- Alert.  No noted deficits.  Skin- Warm.   Psychiatric/Behavioral- Mood and affect normal.     Assessment/Plan   1. Medicare annual wellness visit, subsequent        2. Multiple sclerosis (CMS/[facility])  Opiate/Opioid/Benzo Prescription Compliance    Opiate/Opioid/Benzo Prescription Compliance    OOB Internal Tracking      3. Mixed hyperlipidemia        4. Primary insomnia  Opiate/Opioid/Benzo Prescription Compliance    zolpidem (Ambien) 10 mg tablet    Opiate/Opioid/Benzo Prescription Compliance    OOB Internal Tracking      5. Adrenal insufficiency (Pipestone's disease) (CMS/[facility])        6. Therapeutic drug monitoring  Opiate/Opioid/Benzo Prescription Compliance    Opiate/Opioid/Benzo Prescription Compliance    OOB Internal Tracking      7. Osteoarthritis, unspecified osteoarthritis type, unspecified site  HYDROcodone-ibuprofen (Vicoprofen) 7.5-200 mg tablet      8. Neck pain, chronic  HYDROcodone-ibuprofen (Vicoprofen) 7.5-200 mg tablet      9. Parkinson disease  rOPINIRole (Requip) 1 mg tablet      10. Upper respiratory tract infection, unspecified type  azithromycin (Zithromax) 250 mg tablet      11. SCC (spinal cord compression) (CMS/[facility])        12. 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 Short-coupled ventricular tachycardia (CMS/[facility])        15. Malignant neoplasm of uterus,  unspecified site (CMS/[facility])               Long talk. Treatment options reviewed.    Medicare wellness questionnaire reviewed in detail.   No problems with activities of daily living. Home safety issues reviewed.  Advance care planning discussed.  Reminded to have an updated will if needed.    Chronic pain stable.  Understands to use least amount of pain medication to control her symptoms.  MS stable.  Insomnia controlled  Arthritis stable  Mild URI.  Start Zithromax if not better by tomorrow  Continue and take your medications as prescribed.    Health Maintenance issues discussed.    Importance of healthy diet and regular exercise regimen discussed.    We will contact you with any test results ordered. If you do not hear from us, please contact.    Follow-up as instructed or sooner if any problems or symptoms do not resolve as expected.

## 2024-02-26 ENCOUNTER — OFFICE VISIT (OUTPATIENT)
Dept: PRIMARY CARE | Facility: CLINIC | Age: 64
End: 2024-02-26
Payer: MEDICARE

## 2024-02-26 VITALS
TEMPERATURE: 99.1 F | HEIGHT: 68 IN | SYSTOLIC BLOOD PRESSURE: 124 MMHG | WEIGHT: 129 LBS | DIASTOLIC BLOOD PRESSURE: 82 MMHG | OXYGEN SATURATION: 99 % | HEART RATE: 80 BPM | BODY MASS INDEX: 19.55 KG/M2

## 2024-02-26 DIAGNOSIS — E78.2 MIXED HYPERLIPIDEMIA: ICD-10-CM

## 2024-02-26 DIAGNOSIS — M54.2 NECK PAIN, CHRONIC: ICD-10-CM

## 2024-02-26 DIAGNOSIS — M19.90 OSTEOARTHRITIS, UNSPECIFIED OSTEOARTHRITIS TYPE, UNSPECIFIED SITE: ICD-10-CM

## 2024-02-26 DIAGNOSIS — D68.1 FACTOR XI DEFICIENCY (MULTI): ICD-10-CM

## 2024-02-26 DIAGNOSIS — C55 MALIGNANT NEOPLASM OF UTERUS, UNSPECIFIED SITE (MULTI): ICD-10-CM

## 2024-02-26 DIAGNOSIS — G95.20 SCC (SPINAL CORD COMPRESSION) (MULTI): ICD-10-CM

## 2024-02-26 DIAGNOSIS — G89.29 NECK PAIN, CHRONIC: ICD-10-CM

## 2024-02-26 DIAGNOSIS — J06.9 UPPER RESPIRATORY TRACT INFECTION, UNSPECIFIED TYPE: ICD-10-CM

## 2024-02-26 DIAGNOSIS — Z51.81 THERAPEUTIC DRUG MONITORING: ICD-10-CM

## 2024-02-26 DIAGNOSIS — F51.01 PRIMARY INSOMNIA: ICD-10-CM

## 2024-02-26 DIAGNOSIS — G20.A1 PARKINSON DISEASE (MULTI): ICD-10-CM

## 2024-02-26 DIAGNOSIS — I47.29: ICD-10-CM

## 2024-02-26 DIAGNOSIS — G35 MULTIPLE SCLEROSIS (MULTI): ICD-10-CM

## 2024-02-26 DIAGNOSIS — E27.1 ADRENAL INSUFFICIENCY (ADDISON'S DISEASE) (MULTI): ICD-10-CM

## 2024-02-26 DIAGNOSIS — Z00.00 MEDICARE ANNUAL WELLNESS VISIT, SUBSEQUENT: Primary | ICD-10-CM

## 2024-02-26 DIAGNOSIS — E46 PROTEIN-CALORIE MALNUTRITION, UNSPECIFIED SEVERITY (MULTI): ICD-10-CM

## 2024-02-26 PROBLEM — M04.1 PERIODIC FEVER SYNDROMES (MULTI): Status: RESOLVED | Noted: 2023-05-30 | Resolved: 2024-02-26

## 2024-02-26 PROBLEM — M04.1: Status: RESOLVED | Noted: 2018-02-21 | Resolved: 2024-02-26

## 2024-02-26 PROCEDURE — 80307 DRUG TEST PRSMV CHEM ANLYZR: CPT

## 2024-02-26 PROCEDURE — 82570 ASSAY OF URINE CREATININE: CPT

## 2024-02-26 PROCEDURE — 80358 DRUG SCREENING METHADONE: CPT

## 2024-02-26 PROCEDURE — 99213 OFFICE O/P EST LOW 20 MIN: CPT | Performed by: FAMILY MEDICINE

## 2024-02-26 PROCEDURE — 80354 DRUG SCREENING FENTANYL: CPT

## 2024-02-26 PROCEDURE — G0439 PPPS, SUBSEQ VISIT: HCPCS | Performed by: FAMILY MEDICINE

## 2024-02-26 PROCEDURE — 80365 DRUG SCREENING OXYCODONE: CPT

## 2024-02-26 PROCEDURE — 80361 OPIATES 1 OR MORE: CPT

## 2024-02-26 PROCEDURE — 80368 SEDATIVE HYPNOTICS: CPT

## 2024-02-26 PROCEDURE — 80346 BENZODIAZEPINES1-12: CPT

## 2024-02-26 PROCEDURE — 80373 DRUG SCREENING TRAMADOL: CPT

## 2024-02-26 RX ORDER — ZOLPIDEM TARTRATE 10 MG/1
10 TABLET ORAL NIGHTLY
Qty: 30 TABLET | Refills: 3 | Status: SHIPPED | OUTPATIENT
Start: 2024-02-26 | End: 2024-05-28 | Stop reason: SDUPTHER

## 2024-02-26 RX ORDER — ROPINIROLE 1 MG/1
1-2 TABLET, FILM COATED ORAL NIGHTLY
Qty: 180 TABLET | Refills: 0 | Status: SHIPPED | OUTPATIENT
Start: 2024-02-26 | End: 2024-05-22 | Stop reason: SDUPTHER

## 2024-02-26 RX ORDER — AZITHROMYCIN 250 MG/1
TABLET, FILM COATED ORAL
Qty: 6 TABLET | Refills: 0 | Status: SHIPPED | OUTPATIENT
Start: 2024-02-26 | End: 2024-03-01

## 2024-02-26 RX ORDER — HYDROCODONE BITARTRATE AND IBUPROFEN 7.5; 2 MG/1; MG/1
1 TABLET, FILM COATED ORAL EVERY 4 HOURS PRN
Qty: 150 TABLET | Refills: 0 | Status: SHIPPED | OUTPATIENT
Start: 2024-02-26 | End: 2024-03-25 | Stop reason: SDUPTHER

## 2024-02-26 ASSESSMENT — ACTIVITIES OF DAILY LIVING (ADL)
TAKING_MEDICATION: INDEPENDENT
DOING_HOUSEWORK: INDEPENDENT
DRESSING: INDEPENDENT
BATHING: INDEPENDENT
MANAGING_FINANCES: INDEPENDENT
GROCERY_SHOPPING: INDEPENDENT

## 2024-02-26 ASSESSMENT — PATIENT HEALTH QUESTIONNAIRE - PHQ9
1. LITTLE INTEREST OR PLEASURE IN DOING THINGS: NOT AT ALL
SUM OF ALL RESPONSES TO PHQ9 QUESTIONS 1 AND 2: 0
2. FEELING DOWN, DEPRESSED OR HOPELESS: NOT AT ALL

## 2024-02-26 ASSESSMENT — ENCOUNTER SYMPTOMS
DEPRESSION: 0
OCCASIONAL FEELINGS OF UNSTEADINESS: 0
LOSS OF SENSATION IN FEET: 0

## 2024-02-27 LAB
AMPHETAMINES UR QL SCN: NORMAL
BARBITURATES UR QL SCN: NORMAL
BZE UR QL SCN: NORMAL
CANNABINOIDS UR QL SCN: NORMAL
CREAT UR-MCNC: 118.5 MG/DL (ref 20–320)
PCP UR QL SCN: NORMAL

## 2024-03-01 LAB
1OH-MIDAZOLAM UR CFM-MCNC: <25 NG/ML
6MAM UR CFM-MCNC: <25 NG/ML
7AMINOCLONAZEPAM UR CFM-MCNC: <25 NG/ML
A-OH ALPRAZ UR CFM-MCNC: <25 NG/ML
ALPRAZ UR CFM-MCNC: <25 NG/ML
CHLORDIAZEP UR CFM-MCNC: <25 NG/ML
CLONAZEPAM UR CFM-MCNC: <25 NG/ML
CODEINE UR CFM-MCNC: <50 NG/ML
DIAZEPAM UR CFM-MCNC: <25 NG/ML
EDDP UR CFM-MCNC: <25 NG/ML
FENTANYL UR CFM-MCNC: <2.5 NG/ML
HYDROCODONE CTO UR CFM-MCNC: 2105 NG/ML
HYDROMORPHONE UR CFM-MCNC: 419 NG/ML
LORAZEPAM UR CFM-MCNC: <25 NG/ML
METHADONE UR CFM-MCNC: <25 NG/ML
MIDAZOLAM UR CFM-MCNC: <25 NG/ML
MORPHINE UR CFM-MCNC: <50 NG/ML
NORDIAZEPAM UR CFM-MCNC: <25 NG/ML
NORFENTANYL UR CFM-MCNC: <2.5 NG/ML
NORHYDROCODONE UR CFM-MCNC: >1000 NG/ML
NOROXYCODONE UR CFM-MCNC: <25 NG/ML
NORTRAMADOL UR-MCNC: <50 NG/ML
OXAZEPAM UR CFM-MCNC: <25 NG/ML
OXYCODONE UR CFM-MCNC: <25 NG/ML
OXYMORPHONE UR CFM-MCNC: <25 NG/ML
TEMAZEPAM UR CFM-MCNC: <25 NG/ML
TRAMADOL UR CFM-MCNC: <50 NG/ML
ZOLPIDEM UR CFM-MCNC: >1000 NG/ML
ZOLPIDEM UR-MCNC: 47 NG/ML

## 2024-03-07 ENCOUNTER — LAB (OUTPATIENT)
Dept: LAB | Facility: LAB | Age: 64
End: 2024-03-07
Payer: MEDICARE

## 2024-03-07 DIAGNOSIS — M25.50 ARTHRALGIA, UNSPECIFIED JOINT: ICD-10-CM

## 2024-03-07 DIAGNOSIS — N39.0 URINARY TRACT INFECTION WITHOUT HEMATURIA, SITE UNSPECIFIED: ICD-10-CM

## 2024-03-07 DIAGNOSIS — D30.3 BENIGN BLADDER TUMOR: ICD-10-CM

## 2024-03-07 LAB
APPEARANCE UR: ABNORMAL
BILIRUB UR STRIP.AUTO-MCNC: NEGATIVE MG/DL
COLOR UR: YELLOW
CRP SERPL-MCNC: <0.1 MG/DL
ERYTHROCYTE [SEDIMENTATION RATE] IN BLOOD BY WESTERGREN METHOD: 5 MM/H (ref 0–30)
GLUCOSE UR STRIP.AUTO-MCNC: NEGATIVE MG/DL
KETONES UR STRIP.AUTO-MCNC: NEGATIVE MG/DL
LEUKOCYTE ESTERASE UR QL STRIP.AUTO: NEGATIVE
MUCOUS THREADS #/AREA URNS AUTO: ABNORMAL /LPF
NITRITE UR QL STRIP.AUTO: NEGATIVE
PH UR STRIP.AUTO: 5 [PH]
PROT UR STRIP.AUTO-MCNC: NEGATIVE MG/DL
RBC # UR STRIP.AUTO: ABNORMAL /UL
RBC #/AREA URNS AUTO: >20 /HPF
SP GR UR STRIP.AUTO: 1.02
UROBILINOGEN UR STRIP.AUTO-MCNC: <2 MG/DL
WBC #/AREA URNS AUTO: ABNORMAL /HPF

## 2024-03-07 PROCEDURE — 86038 ANTINUCLEAR ANTIBODIES: CPT

## 2024-03-07 PROCEDURE — 85652 RBC SED RATE AUTOMATED: CPT

## 2024-03-07 PROCEDURE — 88112 CYTOPATH CELL ENHANCE TECH: CPT | Performed by: PATHOLOGY

## 2024-03-07 PROCEDURE — 88112 CYTOPATH CELL ENHANCE TECH: CPT

## 2024-03-07 PROCEDURE — 86140 C-REACTIVE PROTEIN: CPT

## 2024-03-07 PROCEDURE — 36415 COLL VENOUS BLD VENIPUNCTURE: CPT

## 2024-03-07 PROCEDURE — 81001 URINALYSIS AUTO W/SCOPE: CPT

## 2024-03-08 LAB
ANA SER QL HEP2 SUBST: NEGATIVE
LABORATORY COMMENT REPORT: NORMAL
LABORATORY COMMENT REPORT: NORMAL
PATH REPORT.FINAL DX SPEC: NORMAL
PATH REPORT.GROSS SPEC: NORMAL
PATH REPORT.RELEVANT HX SPEC: NORMAL
PATH REPORT.TOTAL CANCER: NORMAL

## 2024-03-12 ENCOUNTER — TELEPHONE (OUTPATIENT)
Dept: PRIMARY CARE | Facility: CLINIC | Age: 64
End: 2024-03-12
Payer: MEDICARE

## 2024-03-12 NOTE — TELEPHONE ENCOUNTER
----- Message from Miguel Magaña MD sent at 3/12/2024  7:30 AM EDT -----  Labs are within normal limits or stable.  Continue healthy diet and follow-up per routine.  Please let them know

## 2024-03-25 DIAGNOSIS — M19.90 OSTEOARTHRITIS, UNSPECIFIED OSTEOARTHRITIS TYPE, UNSPECIFIED SITE: ICD-10-CM

## 2024-03-25 DIAGNOSIS — G89.29 NECK PAIN, CHRONIC: ICD-10-CM

## 2024-03-25 DIAGNOSIS — M54.2 NECK PAIN, CHRONIC: ICD-10-CM

## 2024-03-25 RX ORDER — HYDROCODONE BITARTRATE AND IBUPROFEN 7.5; 2 MG/1; MG/1
1 TABLET, FILM COATED ORAL EVERY 4 HOURS PRN
Qty: 150 TABLET | Refills: 0 | Status: SHIPPED | OUTPATIENT
Start: 2024-03-25 | End: 2024-04-24 | Stop reason: SDUPTHER

## 2024-03-25 NOTE — TELEPHONE ENCOUNTER
----- Message from Jennifer Gonzalez sent at 3/25/2024  8:18 AM EDT -----  Regarding: Rx refill  Contact: [phone]  Could you please refill my Vicoprofen 7.5/200 prescription. Send it to [facility]. Thank you

## 2024-04-19 RX ORDER — NITROFURANTOIN MACROCRYSTALS 50 MG/1
50 CAPSULE ORAL EVERY 12 HOURS
Qty: 4 CAPSULE | Refills: 0 | Status: CANCELLED | OUTPATIENT
Start: 2024-04-19 | End: 2024-04-21

## 2024-04-19 RX ORDER — NITROFURANTOIN MACROCRYSTALS 50 MG/1
50 CAPSULE ORAL EVERY 12 HOURS
Qty: 4 CAPSULE | Refills: 0 | Status: SHIPPED | OUTPATIENT
Start: 2024-04-19 | End: 2024-04-21

## 2024-04-23 ENCOUNTER — PROCEDURE VISIT (OUTPATIENT)
Dept: UROLOGY | Facility: CLINIC | Age: 64
End: 2024-04-23
Payer: MEDICARE

## 2024-04-23 VITALS
BODY MASS INDEX: 19.58 KG/M2 | DIASTOLIC BLOOD PRESSURE: 80 MMHG | WEIGHT: 128.75 LBS | SYSTOLIC BLOOD PRESSURE: 138 MMHG | HEART RATE: 76 BPM | RESPIRATION RATE: 14 BRPM

## 2024-04-23 DIAGNOSIS — N35.92 STRICTURE OF FEMALE URETHRA, UNSPECIFIED STRICTURE TYPE: ICD-10-CM

## 2024-04-23 DIAGNOSIS — R33.9 RETENTION OF URINE: ICD-10-CM

## 2024-04-23 DIAGNOSIS — R33.9 URINARY RETENTION: Primary | ICD-10-CM

## 2024-04-23 PROCEDURE — 52281 CYSTOSCOPY AND TREATMENT: CPT | Performed by: UROLOGY

## 2024-04-23 NOTE — PROGRESS NOTES
Provider Impressions     63 year-old white female originally seen on 08/17/04. Patient had a long history of URINARY TRACT INFECTIONS, DYSURIA, URETHRAL STRICTURES. No family history of breast or prostate cancer. The patient does smoke cigarettes on a social basis.     In 2002, patient underwent a total abdominal hysterectomy with bilateral salpingo-oophorectomy (GLORIA/ BSO) for UTERINE CANCER. She also has developed a CYSTOCELE grade 2 and a RECTOCELE grade 2. She was receiving CYSTOSCOPIES with URETHRAL DILATATIONS UNDER ANESTHESIA every 3-6 months.     Then, the patient DISAPPEARED.     She returned in 2014 with RHEUMATOID ARTHRITIS, THYROID DISEASE, and other medical problems.      On 06/05/14 we performed a CYSTOSCOPY with URETHRAL DILATATION under anesthesia. She strictured and created scar tissue within 3 weeks. We are attempting to maintain her in the office setting.She is also titrating her Flomax doses.     10/01/14 CYSTOSCOPY with URETHRAL DILATATION to 30 Angolan. Patient had been noticing a decreased flow stream for the past 2 days prior.     11/04/14 CYSTOSCOPY with URETHRAL DILATATION to 30 Angolan. No bleeding. Patient states that she was straining to urinate over the past 4 days     12/03/14, patient recently diagnosed with MEDITERRANEAN FEVER. She states now for the past 2 days she said significant DYSURIA and straining to urinate. She was successfully DILATED to 30 Angolan with minimal bleeding. No family history of breast or prostate cancer. Presently an occasional cigarette smoker.     01/07/14, patient is now being treated with colchicine. She states that for the first time, she has not had straining towards the end of her stream. 12 Angolan STRICTURE successfully DILATED to 30 Angolan. However, the lower half of the bladder was erythematous. This is surprising in light of the fact that the patient had no symptoms. Fortunately, her postvoid residual is only 16 cc, a dramatic decrease from the 2-300  cc PVRs.     03/27/15, May 8 2015, 06/08/15, successful DILATION of to 30 Moldovan, normal-appearing bladder, some difficulty,      03/27/15, May 8 2015, 06/08/15, 08/31/15 successful DILATION to 30 Moldovan, normal-appearing bladder, some difficulty, flowrate 29 cc/s, PVR 0 cc.      COMPUTERS DOWN  COMPUTERS DOWN  COMPUTERS DOWN      10/05/15, patient states that she is noticed a decreased flow of stream over the past 5 days. This is accompanied with significant NOCTURIA. Successfully DILATED to 30 Moldovan today. Good flow rate of 16 cc/s. She will continue on the five-week regimen.     01/04/16 successful DILATATION to 30 Moldovan with pain today. She noticed that she is spraying when she urinates. We will increase to 6 weeks at this time. Flow of 7 with a PVR 0.     02/15/16, successful DILATATION to 30 Moldovan. Patient has been having some FREQUENCY over the last week and NOCTURIA. There was minimal pain. Some ERYTHEMA along the base of the bladder. We will continue at a 6 week regimen. Flow rate of 32 with a PVR of 90     03/28/16, successful DILATATION to 30 Moldovan. Less FREQUENCY. Only 3 days of SPRAYING during urination prior to today's DILATATION. Minimal pain. Trigone and base are ERYTHEMATOUS. Patient states that she has discomfort with the first void of the day. We will continue at a 6 week regimen. Flow rate of 14 with a PVR of 47. She will be maintaining Flomax.     05/03/16, successful CYSTOSCOPY with URETHRAL DILATATION to 30 Moldovan. Once again, the patient states she was experiencing SUPRAPUBIC PAIN and spraying with urination over the past 3 days prior to this procedure. She wishes to stay at a 6 week regimen. Flow rate of 13 with a PVR 13     06/13/16, successful CYSTOSCOPY with URETHRAL DILATATION of to 30 Moldovan. Patient had minimal pain, still mild diffuse ERYTHEMA. Flow rate of 34 with a PVR of 0. Patient states that over the last 4 days she began spraying her urine. She wishes to maintain a 6 week  regimen.     07/27/16, successful CYSTOSCOPY with URETHRAL DILATATION to 30 Maldivian with pain. The patient states that she has been STRAINING to urinate. Flow rate of 9 with a PVR of 0. She will return in 6 weeks and also have her yearly laboratory testing.     09/12/16, all urine studies are normal. Successful CYSTOSCOPY with URETHRAL DILATATION at 30 Maldivian. The patient states that she is had a spraying stream and disuria. She is 1 week late for her dilation due to scheduling. Flow rate of 11 with a PVR of 0. We will maintain a 6 week regimen.     10/18/16, CYSTOSCOPY with URETHRAL DILATATION to 30 Maldivian. Significantly more pain today than routine. However, virtually no erythema. Flow rate of 13 with a PVR of 0. We will maintain the 6 week regimen. She will continue on Flomax.     11/29/16, CYSTOSCOPY with URETHRAL DILATATION to 30 Maldivian. Somewhat painful. The patient states that over the last 48 hours she has been straining severely to urinate. She will continue on Flomax. PVR 56. She will return in 6 weeks.     01/11/17, patient states that over the last 4 days she has had bladder discomfort, straining to urinate, and bladder spasms during the nighttime hours. She does not wish to increase her time. Wants to say at 6 weeks interval. Successful DILATION to 30 Maldivian with minimal pain and bleeding. Flow rate of 24 with a PVR of 136.     02/21/17, patient states she is been straining to urinate over the past 3 days. She is also complaining of intermittent vaginal pain. She had successful CYSTOSCOPY WITH URETHRAL DILATATION to 30 Maldivian with discomfort. She wants to maintain the 6 week schedule. Flow rate of 9 with a PVR of 0. I will refer her to Dr. Barkley for evaluation of her GYN issues. Today on exam, there was significant discomfort along the posterior vaginal wall.     04/04/17, patient saw Dr. Barkley in consultation and recommended PELVIC PHYSICAL THERAPY and Botox. Patient will consider that alternative  treatment and let him know of her decision. She was DILATED FOR A URETHRAL STRICTURE today with a CYSTOSCOPY WITH URETHRAL DILATATION to 30 Turkish. Moderate discomfort. The bladder was only mildly erythematous. Flow rate of 9 with a PVR of 12. She wishes to keep a 6 week schedule. She will continue on Flomax.     05/19/17, successful CYSTOSCOPY WITH URETHRAL DILATATION to 30 Turkish. Flow rate of 20 with a PVR of 86. Patient states that over the last 3 days she has been straining to urinate. Continue Flomax. She will return in 6 weeks.     06/28/17, successful CYSTOSCOPY WITH URETHRAL DILATATION at 30 Turkish with minimal pain. Flow rate of 24 with a PVR 66. Patient states that over the last 3 days she has been straining to urinate with discomfort. She will continue Flomax. She will return in 6 weeks and also obtain her yearly labs.     08/15/17, successful CYSTOSCOPY WITH URETHRAL DILATATION to 30 Turkish moderate pain. Flow rate of 7 with a PVR of 80. Patient has been straining for the last week to urinate. She will continue on Flomax. Urinalysis shows 50 red blood cells, urine cultures negative, urine cytology is negative. We will continue Flomax and urethral dilatations every 6 weeks. When she returns for her next visit we will order a renal ultrasound with respect to her hematuria     09/26/17, successful CYSTOSCOPY WITH URETHRAL DILATATION to 30 Turkish with moderate pain. Flow rate of 3 with a PVR 14. Patient continues to strain just one week prior to dilatation. She continues on Flomax. Renal ultrasound was completely negative. We will see her again in 6 weeks.     11/08/17, successful CYSTOSCOPY WITH URETHRAL DILATATION to 30 Turkish with mild to moderate pain. Flow rate of 17 with a PVR of 0. She has been straining over the last 3 days to urinate. She continues on Flomax. She strongly wants to continue on a 6 week regimen. She notified us that her insurance is changing and I given her recommendations for new  urologist that we will be on her planned for next year.     12/16/17, successful CYSTOSCOPY WITH URETHRAL DILATATION to 30 [place] African with mild to moderate pain. Flow rate of 6 with a PVR of 23. Again, she has been straining over the last 4-5 days with urination. She continues on Flomax. She is scheduled for major back surgery with Dr. Maldonado at the main campus [facility] immediately following [place]. Due to change in insurance, she will be continuing her urologic care with Dr. Schroeder. We will send all pertinent information to him.     05/14/18, in office urethral dilation to 30 [place] African. Patient returns complaining of significant urgency and frequency. Patient complains of incomplete emptying. Office visit with urethral dilatation performed with minimal pain. She wishes to return in 3 months.     08/22/18, in office urethral dilation to 30 [place] African. Patient returns complaining of straining on urination for the last week.. Patient complains of incomplete emptying. Office visit with urethral dilatation performed with minimal pain. She wishes to return in 3 months.     02/12/19, successful cystoscopy with urethral dilatation to 30 [place] African of a very dense stricture, with pain and bleeding. Trigone and posterior bladder quite erythematous. Patient has changed her insurance and is able to return in wishes to initiate a 2 month schedule at this time. She will discontinue her Flomax to due to side effects.     04/15/19, successful cystoscopy with urethral dilatation to 30 [place] of a very dense stricture, with pain. The bladder has patchy erythema.. Patient wishes to initiate a 2 month schedule at this time.     06/14/19, successful cystoscopy with urethral dilatation to 30 [place] of a urethral stricture. Minimal pain. The erythema within the bladder has improved. Patient will continue on a 2 month schedule.     09/03/19, successful cystoscopy with urethral dilatation at 30 [place] African of a urethral stricture. Minimal pain. Mild erythema  within the trigone and posterior bladder. Patient states she has been straining over the last 10 days and wishes to maintain her 2 month schedule for another year. Urine culture was positive for Klebsiella sensitive to Bactrim. Cytology is negative.     01/21/20, successful cystoscopy with urethral dilatation to 30 Citizen of [place] of a urethral stricture. Minimal pain. Posterior bladder has moderate erythema. Patient complains of suprapubic pain however she is late for her schedule. She will maintain a 2 month schedule.     03/16/20, successful cystoscopy with urethral dilatation to 30 Citizen of [place] of a urethral stricture. Minimal pain with no bleeding. Trigone and bladder base has minimal erythema. Patient now complains of bilateral lower quadrant pain. Vaginal exam did not reproduce the pain. I recommended that she speak to her primary care physician or gastro-and neurologist. She does not have any reproductive organs remaining. We will see her again in 2 months.     05/18/20, successful cystoscopy with urethral dilatation at 30 Citizen of [place] of a tight urethral stricture. There was minimal pain and no bleeding. Once again the trigone and bladder base has minimal erythema. She was complaining of suprapubic pain which could not be reproduced by vaginal exam however abdominal exam along her Pfannenstiel incision did reproduce the discomfort. She will return in 2 months.     09/25/20, successful cystoscopy with urethral dilatation to 30 Citizen of [place] of a tight urethral stricture. Minimal pain with no bleeding. Erythema in the trigone and posterior bladder with a clear line of demarcation. No further suprapubic pain. She wishes to expand to a 3 month program.     11/23/20, successful cystoscopy with urethral dilatation to 30 Citizen of [place] of a urethral stricture. Minimal pain and no bleeding. Once again, erythema in the trigone and bladder base with a clear line of demarcation. She states she has been straining the last 2 weeks to urinate. She wishes  to maintain a 3 month schedule.     February 26, 2021, successful cystoscopy with urethral dilatation at 30 Malian of a dense urethral stricture. Moderate pain and no bleeding. Bladder based erythema has resolved however the trigone is still inflamed. Once again, she has been straining the last 2 to 3 weeks with urination, has frequency every 35 to 45 minutes, and wishes to maintain a 3-month schedule.     May 25, 2021, successful cystoscopy with urethral dilatation to 30 Malian of a dense urethral stricture. Once again, moderate pain and no bleeding. Erythema in the trigone and posterior bladder. She states that she feels that she has been straining with obstruction to urinate recently. She will return in 3 months.     August 20, 2021, successful cystoscopy with urethral dilatation of a dense stricture to 30 Malian. Moderate pain. Mild erythema posteriorly and moderate erythema at the trigone. Urinalysis shows small blood, urine culture no growth, urine cytology is lacking atypia. I was suggesting increasing her regimen to 4 months, however the patient states that over the last 2 weeks she has been double voiding and now for the first time in years, she is experiencing nocturia x2 with urinary straining. She wishes to maintain her 3-month regimen.     November 23, 2021, successful cystoscopy with urethral dilatation of a dense urethral stricture to 30 Malian. Moderate pain. The only erythema is posteriorly which is moderate. Patient has been straining to urinate for the past week and double voiding. She wishes to maintain her 3-month schedule.     February 23, 2022, successful cystoscopy with dilatation of a dense upward sloping urethral stricture to 30 Malian. Moderate pain. Again, posterior erythema is identified. She wishes to maintain her 3-month schedule.     May 23, 2022, successful cystoscopy with dilatation of a dense upward sloping urethral stricture to 30 Malian. Minimal pain. Erythema in the trigone and  bladder base. Patient is complaining of recurrent right inguinal hernia tender to palpation. I will refer her to Dr. Baldwin. She will return to me in 3 months.     7/8/2022, office visit, Dr. Gricelda Baldwin, general surgery. Evaluation of recurrent inguinal hernias. CAT scan does not reveal any recurrence of hernias and the ilioinguinal nerves are intact.     August 5, 2022, successful cystoscopy with dilatation of an upward sloping urethral stricture to 30 [place]ian. Minimal discomfort. Once again a patch of erythema just lateral to the left ureteral orifice where previous resected bladder tumor site was seen. No evidence of recurrent tumor or stones. Urine cytology is negative for malignant cells. Urine culture no growth. Urinalysis shows 6 red blood cells. Patient complained of double voiding, incomplete emptying and the need to press her suprapubic region to initiate her stream. This is over the last 4 days. She does not want to expand to a 4-month regimen.     November 21, 2022, successful cystoscopy with dilatation of an upward sloping urethral stricture to 30 [place]ian with minimal pain. The well-healed previous bladder tumor site just lateral to the left ureteral orifice was seen once again. No new tumors or stones. She wishes to maintain a 3-month regimen.     May 5, 2023, successful cystoscopy with dilatation of an upward sloping urethral stricture to 30 [place] with moderate discomfort. Erythema is concentrated posteriorly with a clear line of demarcation. Previous bladder tumors sites well-healed seen just lateral to the left ureteral orifice. She has been caring for her  who has been suffering from pulmonary issues leading to septic shock. She will return in 3 months.     August 7, 2023, successful cystoscopy with dilatation of an upward sloping urethral stricture to 30 [place]amb[place] with minimal discomfort and no bleeding. Once again erythema is identified posteriorly. Urinalysis shows 20 red blood cells. Urine  culture no growth. Urine cytology is negative for malignant cells. We will order a renal and bladder ultrasound before her next appointment. We will also repeat her urinalysis and urine culture. She is no longer straining and we agreed to expand to 4 months regimen.     FLOMAX allergy     December 20, 2023, successful cystoscopy with dilatation of an upward sloping urethral stricture to 30 Scottish with minimal pain, no bleeding.  Erythema is again confined posteriorly.  Renal ultrasound shows a 3 mm nonobstructing right renal calculus.  Patient is complaining of right flank and right groin pain.  We will order a renal colic CAT scan now as well as her urine studies which she did not complete.  She wishes to maintain her 4-month schedule.    January 4, 2024, renal colic CAT scan does not identify any stones or tumors    April 23, 2024, successful cystoscopy with dilatation of 30 [place] of an upward sloping dense urethral stricture with minimal pain and no bleeding.  Erythema once again identified in the base.  No further right groin pain.  She will return in 4 months.     PLAN:     #1 patient will return in 4 months for cystoscopy with urethral dilatation at 30 Scottish. She will receive MACRODANTIN 50 mg PROPHYLAXIS 4 doses.      #2 in August 2024 with urine studies. Consider expanding to 5 months at that time.     Physical Exam  Vitals and nursing note reviewed. Exam conducted with a chaperone present.   Constitutional:       Appearance: Normal appearance.   HENT:      Head: Normocephalic and atraumatic.   Pulmonary:      Effort: Pulmonary effort is normal.   Abdominal:      Palpations: Abdomen is soft.      Tenderness: There is right CVA tenderness.   Genitourinary:     General: Normal vulva.      Vagina: No vaginal discharge.   Musculoskeletal:         General: Normal range of motion.      Cervical back: Normal range of motion and neck supple.   Neurological:      General: No focal deficit present.      Mental  Status: She is alert and oriented to person, place, and time.   Psychiatric:         Mood and Affect: Mood normal.         Behavior: Behavior normal.        This note was created with voice-recognition software and was not corrected for typographical or grammatical errors.

## 2024-04-23 NOTE — LETTER
April 23, 2024     Miguel Magaña MD  [address]  [place] [address]    Patient: Jennifer Gonzalez   YOB: 1960   Date of Visit: 4/23/2024       Dear Dr. Miguel Magaña MD:    Thank you for referring Jennifer Gonzalez to me for evaluation. Below are my notes for this consultation.  If you have questions, please do not hesitate to call me. I look forward to following your patient along with you.       Sincerely,     Saeid Larson MD      CC: No Recipients  ______________________________________________________________________________________      Provider Impressions     63 year-old white female originally seen on 08/17/04. Patient had a long history of URINARY TRACT INFECTIONS, DYSURIA, URETHRAL STRICTURES. No family history of breast or prostate cancer. The patient does smoke cigarettes on a social basis.     In 2002, patient underwent a total abdominal hysterectomy with bilateral salpingo-oophorectomy (GLORIA/ BSO) for UTERINE CANCER. She also has developed a CYSTOCELE grade 2 and a RECTOCELE grade 2. She was receiving CYSTOSCOPIES with URETHRAL DILATATIONS UNDER ANESTHESIA every 3-6 months.     Then, the patient DISAPPEARED.     She returned in 2014 with RHEUMATOID ARTHRITIS, THYROID DISEASE, and other medical problems.      On 06/05/14 we performed a CYSTOSCOPY with URETHRAL DILATATION under anesthesia. She strictured and created scar tissue within 3 weeks. We are attempting to maintain her in the office setting.She is also titrating her Flomax doses.     10/01/14 CYSTOSCOPY with URETHRAL DILATATION to 30 Indian. Patient had been noticing a decreased flow stream for the past 2 days prior.     11/04/14 CYSTOSCOPY with URETHRAL DILATATION to 30 Indian. No bleeding. Patient states that she was straining to urinate over the past 4 days     12/03/14, patient recently diagnosed with MEDITERRANEAN FEVER. She states now for the past 2 days she said significant DYSURIA and straining to urinate. She was  successfully DILATED to 30 Mauritanian with minimal bleeding. No family history of breast or prostate cancer. Presently an occasional cigarette smoker.     01/07/14, patient is now being treated with colchicine. She states that for the first time, she has not had straining towards the end of her stream. 12 Mauritanian STRICTURE successfully DILATED to 30 Mauritanian. However, the lower half of the bladder was erythematous. This is surprising in light of the fact that the patient had no symptoms. Fortunately, her postvoid residual is only 16 cc, a dramatic decrease from the 2-300 cc PVRs.     03/27/15, May 8 2015, 06/08/15, successful DILATION of to 30 Mauritanian, normal-appearing bladder, some difficulty,      03/27/15, May 8 2015, 06/08/15, 08/31/15 successful DILATION to 30 Mauritanian, normal-appearing bladder, some difficulty, flowrate 29 cc/s, PVR 0 cc.      COMPUTERS DOWN  COMPUTERS DOWN  COMPUTERS DOWN      10/05/15, patient states that she is noticed a decreased flow of stream over the past 5 days. This is accompanied with significant NOCTURIA. Successfully DILATED to 30 Mauritanian today. Good flow rate of 16 cc/s. She will continue on the five-week regimen.     01/04/16 successful DILATATION to 30 Mauritanian with pain today. She noticed that she is spraying when she urinates. We will increase to 6 weeks at this time. Flow of 7 with a PVR 0.     02/15/16, successful DILATATION to 30 Mauritanian. Patient has been having some FREQUENCY over the last week and NOCTURIA. There was minimal pain. Some ERYTHEMA along the base of the bladder. We will continue at a 6 week regimen. Flow rate of 32 with a PVR of 90     03/28/16, successful DILATATION to 30 Mauritanian. Less FREQUENCY. Only 3 days of SPRAYING during urination prior to today's DILATATION. Minimal pain. Trigone and base are ERYTHEMATOUS. Patient states that she has discomfort with the first void of the day. We will continue at a 6 week regimen. Flow rate of 14 with a PVR of 47. She will be  maintaining Flomax.     05/03/16, successful CYSTOSCOPY with URETHRAL DILATATION to 30 Luxembourger. Once again, the patient states she was experiencing SUPRAPUBIC PAIN and spraying with urination over the past 3 days prior to this procedure. She wishes to stay at a 6 week regimen. Flow rate of 13 with a PVR 13     06/13/16, successful CYSTOSCOPY with URETHRAL DILATATION of to 30 Luxembourger. Patient had minimal pain, still mild diffuse ERYTHEMA. Flow rate of 34 with a PVR of 0. Patient states that over the last 4 days she began spraying her urine. She wishes to maintain a 6 week regimen.     07/27/16, successful CYSTOSCOPY with URETHRAL DILATATION to 30 Luxembourger with pain. The patient states that she has been STRAINING to urinate. Flow rate of 9 with a PVR of 0. She will return in 6 weeks and also have her yearly laboratory testing.     09/12/16, all urine studies are normal. Successful CYSTOSCOPY with URETHRAL DILATATION at 30 Luxembourger. The patient states that she is had a spraying stream and disuria. She is 1 week late for her dilation due to scheduling. Flow rate of 11 with a PVR of 0. We will maintain a 6 week regimen.     10/18/16, CYSTOSCOPY with URETHRAL DILATATION to 30 Luxembourger. Significantly more pain today than routine. However, virtually no erythema. Flow rate of 13 with a PVR of 0. We will maintain the 6 week regimen. She will continue on Flomax.     11/29/16, CYSTOSCOPY with URETHRAL DILATATION to 30 Luxembourger. Somewhat painful. The patient states that over the last 48 hours she has been straining severely to urinate. She will continue on Flomax. PVR 56. She will return in 6 weeks.     01/11/17, patient states that over the last 4 days she has had bladder discomfort, straining to urinate, and bladder spasms during the nighttime hours. She does not wish to increase her time. Wants to say at 6 weeks interval. Successful DILATION to 30 Luxembourger with minimal pain and bleeding. Flow rate of 24 with a PVR of 136.     02/21/17,  patient states she is been straining to urinate over the past 3 days. She is also complaining of intermittent vaginal pain. She had successful CYSTOSCOPY WITH URETHRAL DILATATION to 30 Austrian with discomfort. She wants to maintain the 6 week schedule. Flow rate of 9 with a PVR of 0. I will refer her to Dr. Barkley for evaluation of her GYN issues. Today on exam, there was significant discomfort along the posterior vaginal wall.     04/04/17, patient saw Dr. Barkley in consultation and recommended PELVIC PHYSICAL THERAPY and Botox. Patient will consider that alternative treatment and let him know of her decision. She was DILATED FOR A URETHRAL STRICTURE today with a CYSTOSCOPY WITH URETHRAL DILATATION to 30 Austrian. Moderate discomfort. The bladder was only mildly erythematous. Flow rate of 9 with a PVR of 12. She wishes to keep a 6 week schedule. She will continue on Flomax.     05/19/17, successful CYSTOSCOPY WITH URETHRAL DILATATION to 30 Austrian. Flow rate of 20 with a PVR of 86. Patient states that over the last 3 days she has been straining to urinate. Continue Flomax. She will return in 6 weeks.     06/28/17, successful CYSTOSCOPY WITH URETHRAL DILATATION at 30 Austrian with minimal pain. Flow rate of 24 with a PVR 66. Patient states that over the last 3 days she has been straining to urinate with discomfort. She will continue Flomax. She will return in 6 weeks and also obtain her yearly labs.     08/15/17, successful CYSTOSCOPY WITH URETHRAL DILATATION to 30 Austrian moderate pain. Flow rate of 7 with a PVR of 80. Patient has been straining for the last week to urinate. She will continue on Flomax. Urinalysis shows 50 red blood cells, urine cultures negative, urine cytology is negative. We will continue Flomax and urethral dilatations every 6 weeks. When she returns for her next visit we will order a renal ultrasound with respect to her hematuria     09/26/17, successful CYSTOSCOPY WITH URETHRAL DILATATION to 30  Venezuelan with moderate pain. Flow rate of 3 with a PVR 14. Patient continues to strain just one week prior to dilatation. She continues on Flomax. Renal ultrasound was completely negative. We will see her again in 6 weeks.     11/08/17, successful CYSTOSCOPY WITH URETHRAL DILATATION to 30 Venezuelan with mild to moderate pain. Flow rate of 17 with a PVR of 0. She has been straining over the last 3 days to urinate. She continues on Flomax. She strongly wants to continue on a 6 week regimen. She notified us that her insurance is changing and I given her recommendations for new urologist that we will be on her planned for next year.     12/16/17, successful CYSTOSCOPY WITH URETHRAL DILATATION to 30 Venezuelan with mild to moderate pain. Flow rate of 6 with a PVR of 23. Again, she has been straining over the last 4-5 days with urination. She continues on Flomax. She is scheduled for major back surgery with Dr. Maldonado at the main campus [facility] immediately following [place]. Due to change in insurance, she will be continuing her urologic care with Dr. Schroeder. We will send all pertinent information to him.     05/14/18, in office urethral dilation to 30 Venezuelan. Patient returns complaining of significant urgency and frequency. Patient complains of incomplete emptying. Office visit with urethral dilatation performed with minimal pain. She wishes to return in 3 months.     08/22/18, in office urethral dilation to 30 Venezuelan. Patient returns complaining of straining on urination for the last week.. Patient complains of incomplete emptying. Office visit with urethral dilatation performed with minimal pain. She wishes to return in 3 months.     02/12/19, successful cystoscopy with urethral dilatation to 30 Venezuelan of a very dense stricture, with pain and bleeding. Trigone and posterior bladder quite erythematous. Patient has changed her insurance and is able to return in wishes to initiate a 2 month schedule at this time. She will  discontinue her Flomax to due to side effects.     04/15/19, successful cystoscopy with urethral dilatation to 30 Mozambican of a very dense stricture, with pain. The bladder has patchy erythema.. Patient wishes to initiate a 2 month schedule at this time.     06/14/19, successful cystoscopy with urethral dilatation to 30 Mozambican of a urethral stricture. Minimal pain. The erythema within the bladder has improved. Patient will continue on a 2 month schedule.     09/03/19, successful cystoscopy with urethral dilatation at 30 Mozambican of a urethral stricture. Minimal pain. Mild erythema within the trigone and posterior bladder. Patient states she has been straining over the last 10 days and wishes to maintain her 2 month schedule for another year. Urine culture was positive for Klebsiella sensitive to Bactrim. Cytology is negative.     01/21/20, successful cystoscopy with urethral dilatation to 30 Mozambican of a urethral stricture. Minimal pain. Posterior bladder has moderate erythema. Patient complains of suprapubic pain however she is late for her schedule. She will maintain a 2 month schedule.     03/16/20, successful cystoscopy with urethral dilatation to 30 Mozambican of a urethral stricture. Minimal pain with no bleeding. Trigone and bladder base has minimal erythema. Patient now complains of bilateral lower quadrant pain. Vaginal exam did not reproduce the pain. I recommended that she speak to her primary care physician or gastro-and neurologist. She does not have any reproductive organs remaining. We will see her again in 2 months.     05/18/20, successful cystoscopy with urethral dilatation at 30 Mozambican of a tight urethral stricture. There was minimal pain and no bleeding. Once again the trigone and bladder base has minimal erythema. She was complaining of suprapubic pain which could not be reproduced by vaginal exam however abdominal exam along her Pfannenstiel incision did reproduce the discomfort. She will return in 2  months.     09/25/20, successful cystoscopy with urethral dilatation to 30 Citizen of [place] of a tight urethral stricture. Minimal pain with no bleeding. Erythema in the trigone and posterior bladder with a clear line of demarcation. No further suprapubic pain. She wishes to expand to a 3 month program.     11/23/20, successful cystoscopy with urethral dilatation to 30 Citizen of [place] of a urethral stricture. Minimal pain and no bleeding. Once again, erythema in the trigone and bladder base with a clear line of demarcation. She states she has been straining the last 2 weeks to urinate. She wishes to maintain a 3 month schedule.     February 26, 2021, successful cystoscopy with urethral dilatation at 30 Citizen of [place] of a dense urethral stricture. Moderate pain and no bleeding. Bladder based erythema has resolved however the trigone is still inflamed. Once again, she has been straining the last 2 to 3 weeks with urination, has frequency every 35 to 45 minutes, and wishes to maintain a 3-month schedule.     May 25, 2021, successful cystoscopy with urethral dilatation to 30 Citizen of [place] of a dense urethral stricture. Once again, moderate pain and no bleeding. Erythema in the trigone and posterior bladder. She states that she feels that she has been straining with obstruction to urinate recently. She will return in 3 months.     August 20, 2021, successful cystoscopy with urethral dilatation of a dense stricture to 30 Citizen of [place]. Moderate pain. Mild erythema posteriorly and moderate erythema at the trigone. Urinalysis shows small blood, urine culture no growth, urine cytology is lacking atypia. I was suggesting increasing her regimen to 4 months, however the patient states that over the last 2 weeks she has been double voiding and now for the first time in years, she is experiencing nocturia x2 with urinary straining. She wishes to maintain her 3-month regimen.     November 23, 2021, successful cystoscopy with urethral dilatation of a dense urethral  stricture to 30 Botswanan. Moderate pain. The only erythema is posteriorly which is moderate. Patient has been straining to urinate for the past week and double voiding. She wishes to maintain her 3-month schedule.     February 23, 2022, successful cystoscopy with dilatation of a dense upward sloping urethral stricture to 30 Botswanan. Moderate pain. Again, posterior erythema is identified. She wishes to maintain her 3-month schedule.     May 23, 2022, successful cystoscopy with dilatation of a dense upward sloping urethral stricture to 30 Botswanan. Minimal pain. Erythema in the trigone and bladder base. Patient is complaining of recurrent right inguinal hernia tender to palpation. I will refer her to Dr. Baldwin. She will return to me in 3 months.     7/8/2022, office visit, Dr. Gricelda Baldwin, general surgery. Evaluation of recurrent inguinal hernias. CAT scan does not reveal any recurrence of hernias and the ilioinguinal nerves are intact.     August 5, 2022, successful cystoscopy with dilatation of an upward sloping urethral stricture to 30 Botswanan. Minimal discomfort. Once again a patch of erythema just lateral to the left ureteral orifice where previous resected bladder tumor site was seen. No evidence of recurrent tumor or stones. Urine cytology is negative for malignant cells. Urine culture no growth. Urinalysis shows 6 red blood cells. Patient complained of double voiding, incomplete emptying and the need to press her suprapubic region to initiate her stream. This is over the last 4 days. She does not want to expand to a 4-month regimen.     November 21, 2022, successful cystoscopy with dilatation of an upward sloping urethral stricture to 30 Botswanan with minimal pain. The well-healed previous bladder tumor site just lateral to the left ureteral orifice was seen once again. No new tumors or stones. She wishes to maintain a 3-month regimen.     May 5, 2023, successful cystoscopy with dilatation of an upward sloping  urethral stricture to 30 Grenadian with moderate discomfort. Erythema is concentrated posteriorly with a clear line of demarcation. Previous bladder tumors sites well-healed seen just lateral to the left ureteral orifice. She has been caring for her  who has been suffering from pulmonary issues leading to septic shock. She will return in 3 months.     August 7, 2023, successful cystoscopy with dilatation of an upward sloping urethral stricture to 30 Grenadian with minimal discomfort and no bleeding. Once again erythema is identified posteriorly. Urinalysis shows 20 red blood cells. Urine culture no growth. Urine cytology is negative for malignant cells. We will order a renal and bladder ultrasound before her next appointment. We will also repeat her urinalysis and urine culture. She is no longer straining and we agreed to expand to 4 months regimen.     FLOMAX allergy     December 20, 2023, successful cystoscopy with dilatation of an upward sloping urethral stricture to 30 Grenadian with minimal pain, no bleeding.  Erythema is again confined posteriorly.  Renal ultrasound shows a 3 mm nonobstructing right renal calculus.  Patient is complaining of right flank and right groin pain.  We will order a renal colic CAT scan now as well as her urine studies which she did not complete.  She wishes to maintain her 4-month schedule.    January 4, 2024, renal colic CAT scan does not identify any stones or tumors    April 23, 2024, successful cystoscopy with dilatation of 30 Grenadian of an upward sloping dense urethral stricture with minimal pain and no bleeding.  Erythema once again identified in the base.  No further right groin pain.  She will return in 4 months.     PLAN:     #1 patient will return in 4 months for cystoscopy with urethral dilatation at 30 Grenadian. She will receive MACRODANTIN 50 mg PROPHYLAXIS 4 doses.      #2 in August 2024 with urine studies. Consider expanding to 5 months at that time.     Physical  Exam  Vitals and nursing note reviewed. Exam conducted with a chaperone present.   Constitutional:       Appearance: Normal appearance.   HENT:      Head: Normocephalic and atraumatic.   Pulmonary:      Effort: Pulmonary effort is normal.   Abdominal:      Palpations: Abdomen is soft.      Tenderness: There is right CVA tenderness.   Genitourinary:     General: Normal vulva.      Vagina: No vaginal discharge.   Musculoskeletal:         General: Normal range of motion.      Cervical back: Normal range of motion and neck supple.   Neurological:      General: No focal deficit present.      Mental Status: She is alert and oriented to person, place, and time.   Psychiatric:         Mood and Affect: Mood normal.         Behavior: Behavior normal.        This note was created with voice-recognition software and was not corrected for typographical or grammatical errors.

## 2024-04-23 NOTE — PROGRESS NOTES
"Patient ID: Gunnar Gonzalez is a 63 y.o. female.    Procedures  Pt took macrodantin 50mg po as prescribed  Anesthesia: Local 2% Lidocaine  Instruments: 6F flexible disposable cystoscope    Pt brought to procedure room and placed in dorsal lithotomy position. Pt draped and prepped in normal sterile fashion. 5ml lidocaine instilled into urethral meatus and 5ml instilled into vagina. Pt tolerated well.    I was present as chaperone for the entirety of the procedure   Irasema Ulloa  Cystoscopy performed by Dr. Saeid Larson  Bedside \"Time Out\" Verification   Today's Date: 4/23/2024 . I attest that this time out verification took place prior to the procedure.   Procedure: cysto   RN/LPN/MA:RADHA   Provider: [facility].   Verified By: RN/LPN/MA, RADHA and Provider.   Prior to the start of the procedure a time out was taken and the following were verified: the identity of the patient using two patient identifiers, the correct procedure, the correct site marked as indicated, the correct positioning for the patient and the correct equipment was obtained.   Cystoscopy - female  GUNNAR GONZALEZ  identified using two (2) forms of identification.   Procedure: diagnostic cystourethroscopy.  Procedure Note: Time Started: 10:30AM Time Completed: 11:01 AM  Indications for procedure: irritable voiding symptoms.   Discussed with patient: Risks, benefits, and alternative were discussed in detail. Patient appears to understand and agrees to proceed. Patient has signed the procedure consent form.    CYSTOSCOPY:    Cystoscopy today reveals a dense upward sloping urethral stricture dilated to 30 Turkish with minimal pain and no bleeding.  Once inside the bladder, both ureteral orifices were identified.  A full examination of the bladder only reveals erythema in the base.  No evidence of stones or tumors.      "

## 2024-04-23 NOTE — PATIENT INSTRUCTIONS
Patient Discussion/Summary     Good to see you again. You were successfully dilated to 30 German.  Your bladder does show improvement.  Will see you again in 4 months.      This note was created with voice-recognition software and was not corrected for typographical or grammatical errors.

## 2024-04-24 DIAGNOSIS — M19.90 OSTEOARTHRITIS, UNSPECIFIED OSTEOARTHRITIS TYPE, UNSPECIFIED SITE: ICD-10-CM

## 2024-04-24 DIAGNOSIS — G89.29 NECK PAIN, CHRONIC: ICD-10-CM

## 2024-04-24 DIAGNOSIS — M54.2 NECK PAIN, CHRONIC: ICD-10-CM

## 2024-04-24 RX ORDER — HYDROCODONE BITARTRATE AND IBUPROFEN 7.5; 2 MG/1; MG/1
1 TABLET, FILM COATED ORAL EVERY 4 HOURS PRN
Qty: 150 TABLET | Refills: 0 | Status: SHIPPED | OUTPATIENT
Start: 2024-04-24 | End: 2024-05-23 | Stop reason: SDUPTHER

## 2024-04-24 NOTE — TELEPHONE ENCOUNTER
----- Message from Jennifer Gonzalez sent at 4/23/2024  6:55 PM EDT -----  Regarding: Rx refill  Contact: [phone]  May I have a refill on my Vicoprofen 7.5/200 please.   Send it to [facility].   Thank you

## 2024-05-09 ENCOUNTER — TELEPHONE (OUTPATIENT)
Dept: PRIMARY CARE | Facility: CLINIC | Age: 64
End: 2024-05-09
Payer: MEDICARE

## 2024-05-09 NOTE — TELEPHONE ENCOUNTER
----- Message from Jennifer Gonzalez sent at 5/9/2024  6:19 PM EDT -----  Regarding: Not a question. A FYI  Contact: [phone]  I had a tick that attached itself to the back of my ear. Was not aware of it until the lymph node under my ear got enlarged and I had extreme itching. I pulled it out and it took some of my ear with it. Over a couple of days my ear got red and extremely swollen so I went to [facility] today and because the tick was engorged I’m on 14 days of doxycycline and was told if no better I need to see infectious disease doctor.   Just a FYI

## 2024-05-22 DIAGNOSIS — G20.A1 PARKINSON DISEASE (MULTI): ICD-10-CM

## 2024-05-22 RX ORDER — ROPINIROLE 1 MG/1
1-2 TABLET, FILM COATED ORAL NIGHTLY
Qty: 180 TABLET | Refills: 0 | Status: SHIPPED | OUTPATIENT
Start: 2024-05-22

## 2024-05-22 NOTE — TELEPHONE ENCOUNTER
----- Message from Jennifer Gonzalez sent at 5/22/2024  1:07 PM EDT -----  Regarding: Rx refill  Contact: [phone]  Could you please refill my requip 1mg?  You can send it to [facility]. Thank you

## 2024-05-23 DIAGNOSIS — G89.29 NECK PAIN, CHRONIC: ICD-10-CM

## 2024-05-23 DIAGNOSIS — M19.90 OSTEOARTHRITIS, UNSPECIFIED OSTEOARTHRITIS TYPE, UNSPECIFIED SITE: ICD-10-CM

## 2024-05-23 DIAGNOSIS — M54.2 NECK PAIN, CHRONIC: ICD-10-CM

## 2024-05-23 RX ORDER — HYDROCODONE BITARTRATE AND IBUPROFEN 7.5; 2 MG/1; MG/1
1 TABLET, FILM COATED ORAL EVERY 4 HOURS PRN
Qty: 30 TABLET | Refills: 0 | Status: SHIPPED | OUTPATIENT
Start: 2024-05-23 | End: 2024-05-28 | Stop reason: SDUPTHER

## 2024-05-23 NOTE — PROGRESS NOTES
"Subjective   Patient ID: Jennifer Gonzalez is a 63 y.o. female who presents for Follow-up.    Back Pain  This is a chronic problem. The current episode started more than 1 year ago. The problem occurs daily. The problem has been gradually worsening since onset. The pain is present in the sacro-iliac. The quality of the pain is described as aching. The pain is at a severity of 6/10. The pain is The same all the time. The symptoms are aggravated by bending, lying down, sitting, standing and twisting. Stiffness is present All day. Associated symptoms include paresthesias and pelvic pain.       Patient presents today for pain follow up. Rates the pain a 6/10 over the past 7 days. Reports that the medication gives 90% pain control/relief. [place]AR[place] today. Controlled substance agreement signed. Last took medication thismorning.    Patient presents today for insomnia follow up. Takes Ambien. Reports that the medication gives % relief. OAR[place] reviewed today. Controlled substance agreement signed. Last took medication last night.    Taking current medications which were reviewed.  Problem list discussed.    Overall doing well.  Eating okay.  Staying active.    Wants to see if she can get a xray before seeing orthopedics   Does mention being stressed out being the cause of her high bp reading      ROS  Constitutional- No activity change. No appetite change.  Eyes- Denies vision changes.  Respiratory- No shortness of breath.  Cardiovascular- No palpitations. No chest pain.  GI- No nausea or vomiting. No diarrhea or constipation. Denies abdominal pain.  Musculoskeletal- Denies joint swelling.  Extremities- No edema.  Neurological- Denies headaches. Denies dizziness.  Skin- No rashes.  Psychiatric/Behavioral- Denies significant anxiety, or depressed mood.     Objective     /80 (BP Location: Left arm)   Pulse 63   Temp 36.3 °C (97.3 °F)   Resp 16   Ht 1.727 m (5' 8\")   Wt 58.5 kg (129 lb)   SpO2 99%   BMI 19.61 " kg/m²     Allergies   Allergen Reactions    Tamsulosin Dizziness    Levofloxacin Hives, Other and Swelling     JOINT SWELLING    Morphine Itching    Pentazocine Other    Cephalexin Hives and Rash    Diphenhydramine Anxiety and Unknown    Penicillins Hives and Rash       Constitutional-- Well-nourished.  No distress  Head- unremarkable.  Eyes- PERRL.  Conjunctiva normal.  Nose- Normal.  No rhinorrhea noted.  Throat- Oropharynx is clear and moist.  Neck- Supple with no thyromegaly.  No significant cervical adenopathy noted.  Pulmonary/Chest- Breath sounds normal with normal effort.  No wheezing.  Heart- Regular rate and rhythm.  No murmur.  Abdomen- Soft and non-tender.  No masses noted.  Musculoskeletal-chronic neck and low back pain exacerbated with range of motion.  OA changes hands noted.  Extremities- No edema.   Neurological- Alert.  No noted deficits.  Skin- Warm.  No rashes.  Psychiatric/Behavioral- Mood and affect normal.  Behavior normal.     Assessment/Plan   1. Bipolar 2 disorder (Multi)        2. Multiple sclerosis (Multi)  XR lumbar spine complete 4+ views    XR thoracic spine 3 views      3. Primary insomnia  zolpidem (Ambien) 10 mg tablet      4. Osteoarthritis, unspecified osteoarthritis type, unspecified site  XR lumbar spine complete 4+ views    XR thoracic spine 3 views    HYDROcodone-ibuprofen (Vicoprofen) 7.5-200 mg tablet      5. Neck pain, chronic  HYDROcodone-ibuprofen (Vicoprofen) 7.5-200 mg tablet      6. Parkinson's disease, unspecified whether dyskinesia present, unspecified whether manifestations fluctuate (Multi)        7. SCC (spinal cord compression) (Multi)  XR lumbar spine complete 4+ views    XR thoracic spine 3 views      8. Hashimoto's disease        9. Arthralgia, unspecified joint        10. Anxiety        11. Chronic low back pain without sciatica, unspecified back pain laterality  XR lumbar spine complete 4+ views    XR thoracic spine 3 views             Long talk. Treatment  options reviewed.    Osteoarthritis controlled. Educated the patient on osteoarthritis care and management. Educated on muscle strength and exercise.  Understands to use least amount of medication to control her chronic pain.    Discussed back pain given her worsening symptoms and past surgeries will obtain x-rays to assess for any issues especially related to her back hardware.    Thyroid stable  Continue and take your medications as prescribed.    Health Maintenance issues discussed.    Importance of healthy diet and regular exercise regimen discussed.    We will contact you with any test results ordered. If you do not hear from us, please contact.    Follow-up as instructed or sooner if any problems or symptoms do not resolve as expected.          Scribe Attestation  By signing my name below, ITanja Scribe   attest that this documentation has been prepared under the direction and in the presence of Miguel Magaña MD.

## 2024-05-23 NOTE — TELEPHONE ENCOUNTER
Medication pended    ----- Message from Jennifer Gonzalez sent at 5/23/2024  1:36 PM EDT -----  Regarding: Rx refill   Contact: [phone]  I have an appointment next week but I will need a refill for my vicoprofen 7.5/200 before that   Please send it to [facility], [address].   I understand that it may be a partial amount  until my appointment and that’s okay   Thank you

## 2024-05-27 ASSESSMENT — ENCOUNTER SYMPTOMS
BACK PAIN: 1
PARESTHESIAS: 1

## 2024-05-28 ENCOUNTER — OFFICE VISIT (OUTPATIENT)
Dept: PRIMARY CARE | Facility: CLINIC | Age: 64
End: 2024-05-28
Payer: MEDICARE

## 2024-05-28 VITALS
WEIGHT: 129 LBS | SYSTOLIC BLOOD PRESSURE: 160 MMHG | DIASTOLIC BLOOD PRESSURE: 80 MMHG | TEMPERATURE: 97.3 F | OXYGEN SATURATION: 99 % | BODY MASS INDEX: 19.55 KG/M2 | HEART RATE: 63 BPM | HEIGHT: 68 IN | RESPIRATION RATE: 16 BRPM

## 2024-05-28 DIAGNOSIS — M19.90 OSTEOARTHRITIS, UNSPECIFIED OSTEOARTHRITIS TYPE, UNSPECIFIED SITE: ICD-10-CM

## 2024-05-28 DIAGNOSIS — F41.9 ANXIETY: ICD-10-CM

## 2024-05-28 DIAGNOSIS — F31.81 BIPOLAR 2 DISORDER (MULTI): Primary | ICD-10-CM

## 2024-05-28 DIAGNOSIS — G89.29 NECK PAIN, CHRONIC: ICD-10-CM

## 2024-05-28 DIAGNOSIS — G89.29 CHRONIC LOW BACK PAIN WITHOUT SCIATICA, UNSPECIFIED BACK PAIN LATERALITY: ICD-10-CM

## 2024-05-28 DIAGNOSIS — M25.50 ARTHRALGIA, UNSPECIFIED JOINT: ICD-10-CM

## 2024-05-28 DIAGNOSIS — G20.A1 PARKINSON'S DISEASE, UNSPECIFIED WHETHER DYSKINESIA PRESENT, UNSPECIFIED WHETHER MANIFESTATIONS FLUCTUATE (MULTI): ICD-10-CM

## 2024-05-28 DIAGNOSIS — E06.3 HASHIMOTO'S DISEASE: ICD-10-CM

## 2024-05-28 DIAGNOSIS — F51.01 PRIMARY INSOMNIA: ICD-10-CM

## 2024-05-28 DIAGNOSIS — M54.50 CHRONIC LOW BACK PAIN WITHOUT SCIATICA, UNSPECIFIED BACK PAIN LATERALITY: ICD-10-CM

## 2024-05-28 DIAGNOSIS — G35 MULTIPLE SCLEROSIS (MULTI): ICD-10-CM

## 2024-05-28 DIAGNOSIS — M54.2 NECK PAIN, CHRONIC: ICD-10-CM

## 2024-05-28 DIAGNOSIS — G95.20 SCC (SPINAL CORD COMPRESSION) (MULTI): ICD-10-CM

## 2024-05-28 PROCEDURE — 99213 OFFICE O/P EST LOW 20 MIN: CPT | Performed by: FAMILY MEDICINE

## 2024-05-28 RX ORDER — HYDROCODONE BITARTRATE AND IBUPROFEN 7.5; 2 MG/1; MG/1
1 TABLET, FILM COATED ORAL EVERY 4 HOURS PRN
Qty: 150 TABLET | Refills: 0 | Status: SHIPPED | OUTPATIENT
Start: 2024-05-28 | End: 2024-06-22

## 2024-05-28 RX ORDER — ZOLPIDEM TARTRATE 10 MG/1
10 TABLET ORAL NIGHTLY
Qty: 30 TABLET | Refills: 3 | Status: SHIPPED | OUTPATIENT
Start: 2024-05-28

## 2024-05-28 ASSESSMENT — PATIENT HEALTH QUESTIONNAIRE - PHQ9
2. FEELING DOWN, DEPRESSED OR HOPELESS: NOT AT ALL
SUM OF ALL RESPONSES TO PHQ9 QUESTIONS 1 AND 2: 0
1. LITTLE INTEREST OR PLEASURE IN DOING THINGS: NOT AT ALL

## 2024-05-28 ASSESSMENT — ENCOUNTER SYMPTOMS
BACK PAIN: 1
PARESTHESIAS: 1

## 2024-05-29 ENCOUNTER — HOSPITAL ENCOUNTER (OUTPATIENT)
Dept: RADIOLOGY | Facility: HOSPITAL | Age: 64
Discharge: HOME | End: 2024-05-29
Payer: MEDICARE

## 2024-05-29 DIAGNOSIS — G95.20 SCC (SPINAL CORD COMPRESSION) (MULTI): ICD-10-CM

## 2024-05-29 DIAGNOSIS — G35 MULTIPLE SCLEROSIS (MULTI): ICD-10-CM

## 2024-05-29 DIAGNOSIS — M19.90 OSTEOARTHRITIS, UNSPECIFIED OSTEOARTHRITIS TYPE, UNSPECIFIED SITE: ICD-10-CM

## 2024-05-29 DIAGNOSIS — G89.29 CHRONIC LOW BACK PAIN WITHOUT SCIATICA, UNSPECIFIED BACK PAIN LATERALITY: ICD-10-CM

## 2024-05-29 DIAGNOSIS — M54.50 CHRONIC LOW BACK PAIN WITHOUT SCIATICA, UNSPECIFIED BACK PAIN LATERALITY: ICD-10-CM

## 2024-05-29 PROCEDURE — 72110 X-RAY EXAM L-2 SPINE 4/>VWS: CPT

## 2024-05-29 PROCEDURE — 72110 X-RAY EXAM L-2 SPINE 4/>VWS: CPT | Performed by: RADIOLOGY

## 2024-05-29 PROCEDURE — 72072 X-RAY EXAM THORAC SPINE 3VWS: CPT | Performed by: RADIOLOGY

## 2024-05-29 PROCEDURE — 72072 X-RAY EXAM THORAC SPINE 3VWS: CPT

## 2024-05-31 ENCOUNTER — TELEPHONE (OUTPATIENT)
Dept: PRIMARY CARE | Facility: CLINIC | Age: 64
End: 2024-05-31
Payer: MEDICARE

## 2024-05-31 NOTE — TELEPHONE ENCOUNTER
MELYI:    Pt calling, she states that she is aware of her x-ray results. She wanted you to know that she is in the process of trying to schedule with her Orthopedic Doctor.     Pt aware you are out of the office today

## 2024-06-10 ENCOUNTER — OFFICE VISIT (OUTPATIENT)
Dept: ORTHOPEDIC SURGERY | Facility: CLINIC | Age: 64
End: 2024-06-10
Payer: MEDICARE

## 2024-06-10 DIAGNOSIS — M54.50 ACUTE LOW BACK PAIN WITHOUT SCIATICA, UNSPECIFIED BACK PAIN LATERALITY: Primary | ICD-10-CM

## 2024-06-10 PROCEDURE — 99213 OFFICE O/P EST LOW 20 MIN: CPT | Performed by: ORTHOPAEDIC SURGERY

## 2024-06-10 NOTE — PROGRESS NOTES
Patient returns for follow-up.  In January of this year she had kidney stones, had a CT scan of the abdomen and pelvis.  This demonstrated some fractures in the rods, she was having some back pain and x-rays were obtained which confirmed the marry fractures.  The back pain started around the time of lifting bags of mulch.  She denies any radicular pain or numbness in her legs.    On exam she does not have any focal deficits.  She walks with a normal gait.    I reviewed the CT scan and x-rays.  These do show a left-sided marry fracture at the lower part of the marry with fracture of the accessory marry on the right side.  The main marry on the right is completely intact.  CT scan demonstrates complete bony fusion through the anterior and posterior elements throughout the thoracolumbar spine.    I reassured her that although these rods are fractured they likely did so at some point while the fusion is still healing, at this point there is complete bony fusion and she has nothing to worry about.  She does not require any further surgery.  She was relieved to hear this today.    I encouraged ongoing aerobic exercise and stretching.  She can follow-up with me on an as-needed basis.    *This note was dictated using speech recognition software and was not corrected for spelling or grammatical errors*

## 2024-06-24 ENCOUNTER — APPOINTMENT (OUTPATIENT)
Dept: ORTHOPEDIC SURGERY | Facility: CLINIC | Age: 64
End: 2024-06-24
Payer: MEDICARE

## 2024-06-26 DIAGNOSIS — G89.29 NECK PAIN, CHRONIC: ICD-10-CM

## 2024-06-26 DIAGNOSIS — M54.2 NECK PAIN, CHRONIC: ICD-10-CM

## 2024-06-26 DIAGNOSIS — M19.90 OSTEOARTHRITIS, UNSPECIFIED OSTEOARTHRITIS TYPE, UNSPECIFIED SITE: ICD-10-CM

## 2024-06-26 RX ORDER — HYDROCODONE BITARTRATE AND IBUPROFEN 7.5; 2 MG/1; MG/1
1 TABLET, FILM COATED ORAL EVERY 4 HOURS PRN
Qty: 150 TABLET | Refills: 0 | Status: SHIPPED | OUTPATIENT
Start: 2024-06-26

## 2024-06-26 NOTE — TELEPHONE ENCOUNTER
----- Message from Jennifer Gonzalez sent at 6/26/2024  2:19 PM EDT -----  Regarding: Refill  Contact: [phone]  Would you please refill my Vicoprofen 7.5/200?  Please send it to [facility] thank you

## 2024-07-12 ENCOUNTER — TELEPHONE (OUTPATIENT)
Dept: PRIMARY CARE | Facility: CLINIC | Age: 64
End: 2024-07-12
Payer: MEDICARE

## 2024-07-12 NOTE — TELEPHONE ENCOUNTER
Per Mark... Please advise.    Jennifer DIAZ [id number] [place] [address] Clinical Support Staff (supporting Miguel Magaña MD)37 minutes ago (2:02 PM)       As you may know my mother fractured her femur and is now bedridden.   I’m having horrid pains in my thoracic spine and I’m scared to death the areas above my fusion (above T10)may have issues. I’m mom’s only caregiver.   Would it be unreasonable to maybe request an xray of my thoracic spine that maybe I can get next week?  The broken rods in my lumbar spine the surgeon said the bones are fused to them so right now I don’t need anything done in that area. I just don’t want to have a problem in my thoracic spine and really screw myself up moving mom etc         Thank you     Jennifer DIAZ [id number] [place] [address] Clinical Support Staff (supporting Miguel Magaña MD)5 minutes ago (2:34 PM)       Never mind. I had a thoracic xray with the lumbar. lol. Maybe it’s just muscle spasm ?

## 2024-07-12 NOTE — TELEPHONE ENCOUNTER
Miguel Magaña MD  [id number] Clerical1 minute ago (4:35 PM)       Please let her know it looks like she had an x-ray at the end of May.  I believe she realized that.  Hopefully she will feel better with time and current medications.  Please let her know

## 2024-07-19 DIAGNOSIS — F51.01 PRIMARY INSOMNIA: ICD-10-CM

## 2024-07-19 RX ORDER — ZOLPIDEM TARTRATE 10 MG/1
10 TABLET ORAL NIGHTLY
Qty: 30 TABLET | Refills: 1 | Status: SHIPPED | OUTPATIENT
Start: 2024-07-19

## 2024-07-19 NOTE — TELEPHONE ENCOUNTER
Jennifer Gonzalez [id number] [place] [address] Clinical Support Staff  Phone Number: [phone]     Could you please refill my Ambien 10mg  Send it to [facility].  Thank you

## 2024-07-24 DIAGNOSIS — G89.29 NECK PAIN, CHRONIC: ICD-10-CM

## 2024-07-24 DIAGNOSIS — M54.2 NECK PAIN, CHRONIC: ICD-10-CM

## 2024-07-24 DIAGNOSIS — M19.90 OSTEOARTHRITIS, UNSPECIFIED OSTEOARTHRITIS TYPE, UNSPECIFIED SITE: ICD-10-CM

## 2024-07-24 RX ORDER — HYDROCODONE BITARTRATE AND IBUPROFEN 7.5; 2 MG/1; MG/1
1 TABLET, FILM COATED ORAL EVERY 4 HOURS PRN
Qty: 150 TABLET | Refills: 0 | Status: SHIPPED | OUTPATIENT
Start: 2024-07-24

## 2024-07-24 NOTE — TELEPHONE ENCOUNTER
Last seen 5/28/24  Medication pended  [facility] CONTRACT 2/26/24      Jennifer DIAZ Do [id number] [facility] Clinical Support Staff (supporting Miguel Magaña MD)8 minutes ago (3:53 PM)       Could you please refill my Vicoprofen 7.5/200   And send to [facility]   Thank you

## 2024-07-30 ENCOUNTER — APPOINTMENT (OUTPATIENT)
Dept: CARDIOLOGY | Facility: CLINIC | Age: 64
End: 2024-07-30
Payer: MEDICARE

## 2024-08-19 ENCOUNTER — LAB (OUTPATIENT)
Dept: LAB | Facility: LAB | Age: 64
End: 2024-08-19
Payer: MEDICARE

## 2024-08-19 DIAGNOSIS — R33.9 RETENTION OF URINE: ICD-10-CM

## 2024-08-19 LAB
APPEARANCE UR: CLEAR
BILIRUB UR STRIP.AUTO-MCNC: NEGATIVE MG/DL
COLOR UR: YELLOW
GLUCOSE UR STRIP.AUTO-MCNC: NORMAL MG/DL
KETONES UR STRIP.AUTO-MCNC: NEGATIVE MG/DL
LEUKOCYTE ESTERASE UR QL STRIP.AUTO: NEGATIVE
MUCOUS THREADS #/AREA URNS AUTO: ABNORMAL /LPF
NITRITE UR QL STRIP.AUTO: NEGATIVE
PH UR STRIP.AUTO: 5.5 [PH]
PROT UR STRIP.AUTO-MCNC: NEGATIVE MG/DL
RBC # UR STRIP.AUTO: ABNORMAL /UL
RBC #/AREA URNS AUTO: >20 /HPF
SP GR UR STRIP.AUTO: 1.02
UROBILINOGEN UR STRIP.AUTO-MCNC: NORMAL MG/DL
WBC #/AREA URNS AUTO: ABNORMAL /HPF

## 2024-08-19 PROCEDURE — 81001 URINALYSIS AUTO W/SCOPE: CPT

## 2024-08-19 PROCEDURE — 87086 URINE CULTURE/COLONY COUNT: CPT

## 2024-08-19 RX ORDER — NITROFURANTOIN MACROCRYSTALS 50 MG/1
50 CAPSULE ORAL EVERY 12 HOURS
Qty: 4 CAPSULE | Refills: 0 | Status: SHIPPED | OUTPATIENT
Start: 2024-08-19 | End: 2024-08-21

## 2024-08-20 LAB — BACTERIA UR CULT: NO GROWTH

## 2024-08-22 LAB
LABORATORY COMMENT REPORT: NORMAL
LABORATORY COMMENT REPORT: NORMAL
PATH REPORT.FINAL DX SPEC: NORMAL
PATH REPORT.GROSS SPEC: NORMAL
PATH REPORT.RELEVANT HX SPEC: NORMAL
PATH REPORT.TOTAL CANCER: NORMAL

## 2024-08-26 ENCOUNTER — APPOINTMENT (OUTPATIENT)
Dept: UROLOGY | Facility: CLINIC | Age: 64
End: 2024-08-26
Payer: MEDICARE

## 2024-08-26 VITALS
SYSTOLIC BLOOD PRESSURE: 135 MMHG | WEIGHT: 115 LBS | BODY MASS INDEX: 17.43 KG/M2 | DIASTOLIC BLOOD PRESSURE: 78 MMHG | HEIGHT: 68 IN | RESPIRATION RATE: 16 BRPM

## 2024-08-26 DIAGNOSIS — R33.9 URINARY RETENTION: Primary | ICD-10-CM

## 2024-08-26 DIAGNOSIS — R31.9 HEMATURIA, UNSPECIFIED TYPE: ICD-10-CM

## 2024-08-26 DIAGNOSIS — N35.12 POSTINFECTIVE URETHRAL STRICTURE IN FEMALE: ICD-10-CM

## 2024-08-26 PROCEDURE — 99213 OFFICE O/P EST LOW 20 MIN: CPT | Performed by: UROLOGY

## 2024-08-26 PROCEDURE — 52281 CYSTOSCOPY AND TREATMENT: CPT | Performed by: UROLOGY

## 2024-08-26 NOTE — PROGRESS NOTES
Provider Impressions     63 year-old white female originally seen on 08/17/04. Patient had a long history of URINARY TRACT INFECTIONS, DYSURIA, URETHRAL STRICTURES. No family history of breast or prostate cancer. The patient does smoke cigarettes on a social basis.     In 2002, patient underwent a total abdominal hysterectomy with bilateral salpingo-oophorectomy (GLORIA/ BSO) for UTERINE CANCER. She also has developed a CYSTOCELE grade 2 and a RECTOCELE grade 2. She was receiving CYSTOSCOPIES with URETHRAL DILATATIONS UNDER ANESTHESIA every 3-6 months.     Then, the patient DISAPPEARED.     She returned in 2014 with RHEUMATOID ARTHRITIS, THYROID DISEASE, and other medical problems.      On 06/05/14 we performed a CYSTOSCOPY with URETHRAL DILATATION under anesthesia. She strictured and created scar tissue within 3 weeks. We are attempting to maintain her in the office setting.She is also titrating her Flomax doses.     10/01/14 CYSTOSCOPY with URETHRAL DILATATION to 30 Australian. Patient had been noticing a decreased flow stream for the past 2 days prior.     11/04/14 CYSTOSCOPY with URETHRAL DILATATION to 30 Australian. No bleeding. Patient states that she was straining to urinate over the past 4 days     12/03/14, patient recently diagnosed with MEDITERRANEAN FEVER. She states now for the past 2 days she said significant DYSURIA and straining to urinate. She was successfully DILATED to 30 Australian with minimal bleeding. No family history of breast or prostate cancer. Presently an occasional cigarette smoker.     01/07/14, patient is now being treated with colchicine. She states that for the first time, she has not had straining towards the end of her stream. 12 Australian STRICTURE successfully DILATED to 30 Australian. However, the lower half of the bladder was erythematous. This is surprising in light of the fact that the patient had no symptoms. Fortunately, her postvoid residual is only 16 cc, a dramatic decrease from the 2-300  cc PVRs.     03/27/15, May 8 2015, 06/08/15, successful DILATION of to 30 Citizen of [place], normal-appearing bladder, some difficulty,      03/27/15, May 8 2015, 06/08/15, 08/31/15 successful DILATION to 30 Citizen of [place], normal-appearing bladder, some difficulty, flowrate 29 cc/s, PVR 0 cc.      COMPUTERS DOWN  COMPUTERS DOWN  COMPUTERS DOWN      10/05/15, patient states that she is noticed a decreased flow of stream over the past 5 days. This is accompanied with significant NOCTURIA. Successfully DILATED to 30 Citizen of [place] today. Good flow rate of 16 cc/s. She will continue on the five-week regimen.     01/04/16 successful DILATATION to 30 Citizen of [place] with pain today. She noticed that she is spraying when she urinates. We will increase to 6 weeks at this time. Flow of 7 with a PVR 0.     02/15/16, successful DILATATION to 30 Citizen of [place]. Patient has been having some FREQUENCY over the last week and NOCTURIA. There was minimal pain. Some ERYTHEMA along the base of the bladder. We will continue at a 6 week regimen. Flow rate of 32 with a PVR of 90     03/28/16, successful DILATATION to 30 Citizen of [place]. Less FREQUENCY. Only 3 days of SPRAYING during urination prior to today's DILATATION. Minimal pain. Trigone and base are ERYTHEMATOUS. Patient states that she has discomfort with the first void of the day. We will continue at a 6 week regimen. Flow rate of 14 with a PVR of 47. She will be maintaining Flomax.     05/03/16, successful CYSTOSCOPY with URETHRAL DILATATION to 30 Citizen of [place]. Once again, the patient states she was experiencing SUPRAPUBIC PAIN and spraying with urination over the past 3 days prior to this procedure. She wishes to stay at a 6 week regimen. Flow rate of 13 with a PVR 13     06/13/16, successful CYSTOSCOPY with URETHRAL DILATATION of to 30 Citizen of [place]. Patient had minimal pain, still mild diffuse ERYTHEMA. Flow rate of 34 with a PVR of 0. Patient states that over the last 4 days she began spraying her urine. She wishes to maintain a 6 week  regimen.     07/27/16, successful CYSTOSCOPY with URETHRAL DILATATION to 30 [place]ian with pain. The patient states that she has been STRAINING to urinate. Flow rate of 9 with a PVR of 0. She will return in 6 weeks and also have her yearly laboratory testing.     09/12/16, all urine studies are normal. Successful CYSTOSCOPY with URETHRAL DILATATION at 30 [place]ian. The patient states that she is had a spraying stream and disuria. She is 1 week late for her dilation due to scheduling. Flow rate of 11 with a PVR of 0. We will maintain a 6 week regimen.     10/18/16, CYSTOSCOPY with URETHRAL DILATATION to 30 Gambian. Significantly more pain today than routine. However, virtually no erythema. Flow rate of 13 with a PVR of 0. We will maintain the 6 week regimen. She will continue on Flomax.     11/29/16, CYSTOSCOPY with URETHRAL DILATATION to 30 [place]ian. Somewhat painful. The patient states that over the last 48 hours she has been straining severely to urinate. She will continue on Flomax. PVR 56. She will return in 6 weeks.     01/11/17, patient states that over the last 4 days she has had bladder discomfort, straining to urinate, and bladder spasms during the nighttime hours. She does not wish to increase her time. Wants to say at 6 weeks interval. Successful DILATION to 30 Gambian with minimal pain and bleeding. Flow rate of 24 with a PVR of 136.     02/21/17, patient states she is been straining to urinate over the past 3 days. She is also complaining of intermittent vaginal pain. She had successful CYSTOSCOPY WITH URETHRAL DILATATION to 30 Gambian with discomfort. She wants to maintain the 6 week schedule. Flow rate of 9 with a PVR of 0. I will refer her to Dr. Barkley for evaluation of her GYN issues. Today on exam, there was significant discomfort along the posterior vaginal wall.     04/04/17, patient saw Dr. Barkley in consultation and recommended PELVIC PHYSICAL THERAPY and Botox. Patient will consider that alternative  treatment and let him know of her decision. She was DILATED FOR A URETHRAL STRICTURE today with a CYSTOSCOPY WITH URETHRAL DILATATION to 30 Tristan[place]. Moderate discomfort. The bladder was only mildly erythematous. Flow rate of 9 with a PVR of 12. She wishes to keep a 6 week schedule. She will continue on Flomax.     05/19/17, successful CYSTOSCOPY WITH URETHRAL DILATATION to 30 [place]istan[place]. Flow rate of 20 with a PVR of 86. Patient states that over the last 3 days she has been straining to urinate. Continue Flomax. She will return in 6 weeks.     06/28/17, successful CYSTOSCOPY WITH URETHRAL DILATATION at 30 Tristanian with minimal pain. Flow rate of 24 with a PVR 66. Patient states that over the last 3 days she has been straining to urinate with discomfort. She will continue Flomax. She will return in 6 weeks and also obtain her yearly labs.     08/15/17, successful CYSTOSCOPY WITH URETHRAL DILATATION to 30 Tristanian moderate pain. Flow rate of 7 with a PVR of 80. Patient has been straining for the last week to urinate. She will continue on Flomax. Urinalysis shows 50 red blood cells, urine cultures negative, urine cytology is negative. We will continue Flomax and urethral dilatations every 6 weeks. When she returns for her next visit we will order a renal ultrasound with respect to her hematuria     09/26/17, successful CYSTOSCOPY WITH URETHRAL DILATATION to 30 [place]istan[place] with moderate pain. Flow rate of 3 with a PVR 14. Patient continues to strain just one week prior to dilatation. She continues on Flomax. Renal ultrasound was completely negative. We will see her again in 6 weeks.     11/08/17, successful CYSTOSCOPY WITH URETHRAL DILATATION to 30 Tristan[place] with mild to moderate pain. Flow rate of 17 with a PVR of 0. She has been straining over the last 3 days to urinate. She continues on Flomax. She strongly wants to continue on a 6 week regimen. She notified us that her insurance is changing and I given her recommendations for new  urologist that we will be on her planned for next year.     12/16/17, successful CYSTOSCOPY WITH URETHRAL DILATATION to 30 [place] with mild to moderate pain. Flow rate of 6 with a PVR of 23. Again, she has been straining over the last 4-5 days with urination. She continues on Flomax. She is scheduled for major back surgery with Dr. Maldonado at the main campus [facility] immediately following [place]. Due to change in insurance, she will be continuing her urologic care with Dr. Schroeder. We will send all pertinent information to him.     05/14/18, in office urethral dilation to 30 Liberian. Patient returns complaining of significant urgency and frequency. Patient complains of incomplete emptying. Office visit with urethral dilatation performed with minimal pain. She wishes to return in 3 months.     08/22/18, in office urethral dilation to 30 Liberian. Patient returns complaining of straining on urination for the last week.. Patient complains of incomplete emptying. Office visit with urethral dilatation performed with minimal pain. She wishes to return in 3 months.     02/12/19, successful cystoscopy with urethral dilatation to 30 [place]ian of a very dense stricture, with pain and bleeding. Trigone and posterior bladder quite erythematous. Patient has changed her insurance and is able to return in wishes to initiate a 2 month schedule at this time. She will discontinue her Flomax to due to side effects.     04/15/19, successful cystoscopy with urethral dilatation to 30 [place] of a very dense stricture, with pain. The bladder has patchy erythema.. Patient wishes to initiate a 2 month schedule at this time.     06/14/19, successful cystoscopy with urethral dilatation to 30 Liberian of a urethral stricture. Minimal pain. The erythema within the bladder has improved. Patient will continue on a 2 month schedule.     09/03/19, successful cystoscopy with urethral dilatation at 30 [place] of a urethral stricture. Minimal pain. Mild erythema  within the trigone and posterior bladder. Patient states she has been straining over the last 10 days and wishes to maintain her 2 month schedule for another year. Urine culture was positive for Klebsiella sensitive to Bactrim. Cytology is negative.     01/21/20, successful cystoscopy with urethral dilatation to 30 Danish of a urethral stricture. Minimal pain. Posterior bladder has moderate erythema. Patient complains of suprapubic pain however she is late for her schedule. She will maintain a 2 month schedule.     03/16/20, successful cystoscopy with urethral dilatation to 30 Danish of a urethral stricture. Minimal pain with no bleeding. Trigone and bladder base has minimal erythema. Patient now complains of bilateral lower quadrant pain. Vaginal exam did not reproduce the pain. I recommended that she speak to her primary care physician or gastro-and neurologist. She does not have any reproductive organs remaining. We will see her again in 2 months.     05/18/20, successful cystoscopy with urethral dilatation at 30 Danish of a tight urethral stricture. There was minimal pain and no bleeding. Once again the trigone and bladder base has minimal erythema. She was complaining of suprapubic pain which could not be reproduced by vaginal exam however abdominal exam along her Pfannenstiel incision did reproduce the discomfort. She will return in 2 months.     09/25/20, successful cystoscopy with urethral dilatation to 30 Danish of a tight urethral stricture. Minimal pain with no bleeding. Erythema in the trigone and posterior bladder with a clear line of demarcation. No further suprapubic pain. She wishes to expand to a 3 month program.     11/23/20, successful cystoscopy with urethral dilatation to 30 Danish of a urethral stricture. Minimal pain and no bleeding. Once again, erythema in the trigone and bladder base with a clear line of demarcation. She states she has been straining the last 2 weeks to urinate. She wishes  to maintain a 3 month schedule.     February 26, 2021, successful cystoscopy with urethral dilatation at 30 [place] of a dense urethral stricture. Moderate pain and no bleeding. Bladder based erythema has resolved however the trigone is still inflamed. Once again, she has been straining the last 2 to 3 weeks with urination, has frequency every 35 to 45 minutes, and wishes to maintain a 3-month schedule.     May 25, 2021, successful cystoscopy with urethral dilatation to 30 [place] of a dense urethral stricture. Once again, moderate pain and no bleeding. Erythema in the trigone and posterior bladder. She states that she feels that she has been straining with obstruction to urinate recently. She will return in 3 months.     August 20, 2021, successful cystoscopy with urethral dilatation of a dense stricture to 30 [place]. Moderate pain. Mild erythema posteriorly and moderate erythema at the trigone. Urinalysis shows small blood, urine culture no growth, urine cytology is lacking atypia. I was suggesting increasing her regimen to 4 months, however the patient states that over the last 2 weeks she has been double voiding and now for the first time in years, she is experiencing nocturia x2 with urinary straining. She wishes to maintain her 3-month regimen.     November 23, 2021, successful cystoscopy with urethral dilatation of a dense urethral stricture to 30 [place]. Moderate pain. The only erythema is posteriorly which is moderate. Patient has been straining to urinate for the past week and double voiding. She wishes to maintain her 3-month schedule.     February 23, 2022, successful cystoscopy with dilatation of a dense upward sloping urethral stricture to 30 [place]. Moderate pain. Again, posterior erythema is identified. She wishes to maintain her 3-month schedule.     May 23, 2022, successful cystoscopy with dilatation of a dense upward sloping urethral stricture to 30 [place]. Minimal pain. Erythema in the trigone and  bladder base. Patient is complaining of recurrent right inguinal hernia tender to palpation. I will refer her to Dr. Baldwin. She will return to me in 3 months.     7/8/2022, office visit, Dr. Gricelda Baldwin, general surgery. Evaluation of recurrent inguinal hernias. CAT scan does not reveal any recurrence of hernias and the ilioinguinal nerves are intact.     August 5, 2022, successful cystoscopy with dilatation of an upward sloping urethral stricture to 30 [place]ian. Minimal discomfort. Once again a patch of erythema just lateral to the left ureteral orifice where previous resected bladder tumor site was seen. No evidence of recurrent tumor or stones. Urine cytology is negative for malignant cells. Urine culture no growth. Urinalysis shows 6 red blood cells. Patient complained of double voiding, incomplete emptying and the need to press her suprapubic region to initiate her stream. This is over the last 4 days. She does not want to expand to a 4-month regimen.     November 21, 2022, successful cystoscopy with dilatation of an upward sloping urethral stricture to 30 [place]ian with minimal pain. The well-healed previous bladder tumor site just lateral to the left ureteral orifice was seen once again. No new tumors or stones. She wishes to maintain a 3-month regimen.     May 5, 2023, successful cystoscopy with dilatation of an upward sloping urethral stricture to 30 [place]ian with moderate discomfort. Erythema is concentrated posteriorly with a clear line of demarcation. Previous bladder tumors sites well-healed seen just lateral to the left ureteral orifice. She has been caring for her  who has been suffering from pulmonary issues leading to septic shock. She will return in 3 months.     August 7, 2023, successful cystoscopy with dilatation of an upward sloping urethral stricture to 30 Croatian with minimal discomfort and no bleeding. Once again erythema is identified posteriorly. Urinalysis shows 20 red blood cells. Urine  culture no growth. Urine cytology is negative for malignant cells. We will order a renal and bladder ultrasound before her next appointment. We will also repeat her urinalysis and urine culture. She is no longer straining and we agreed to expand to 4 months regimen.     FLOMAX allergy     2023, successful cystoscopy with dilatation of an upward sloping urethral stricture to 30 Indian with minimal pain, no bleeding.  Erythema is again confined posteriorly.  Renal ultrasound shows a 3 mm nonobstructing right renal calculus.  Patient is complaining of right flank and right groin pain.  We will order a renal colic CAT scan now as well as her urine studies which she did not complete.  She wishes to maintain her 4-month schedule.     2024, renal colic CAT scan does not identify any stones or tumors     2024, successful cystoscopy with dilatation to 30 Indian of an upward sloping dense urethral stricture with minimal pain and no bleeding.  Erythema once again identified in the base.  No further right groin pain.  She will return in 4 months.    2024, patient's mother  at the age of 89    2024, successful cystoscopy with dilatation and 30 Indian of an upward sloping dense urethral stricture with minimal discomfort.  No bleeding.  Erythema posteriorly.  She is complaining again of right flank and inguinal pain and we will order a renal colic CAT scan again.  Urinalysis does show 20 red blood cells.  Urine culture no growth.  Urine cytology was lacking atypia.  She wishes to maintain her 4-month schedule as she was straining to urinate over the last 15 days.     PLAN:     #1 patient will return in 4 months for cystoscopy with urethral dilatation at 30 Indian. She will receive MACRODANTIN 50 mg PROPHYLAXIS 4 doses.      #2 in 2025 with urine studies. Consider expanding to 5 months at that time.    3.  Renal colic CAT scan now for right flank and inguinal pain      Physical Exam  Vitals and nursing note reviewed. Exam conducted with a chaperone present.   Constitutional:       Appearance: Normal appearance.   HENT:      Head: Normocephalic and atraumatic.   Pulmonary:      Effort: Pulmonary effort is normal.   Abdominal:      Palpations: Abdomen is soft.      Tenderness: There is right CVA tenderness.   Genitourinary:     General: Normal vulva.      Vagina: No vaginal discharge.   Musculoskeletal:         General: Normal range of motion.      Cervical back: Normal range of motion and neck supple.   Neurological:      General: No focal deficit present.      Mental Status: She is alert and oriented to person, place, and time.   Psychiatric:         Mood and Affect: Mood normal.         Behavior: Behavior normal.        This note was created with voice-recognition software and was not corrected for typographical or grammatical errors.

## 2024-08-26 NOTE — PROGRESS NOTES
"Pt took macrodantin 50mg po as prescribed  Anesthesia: Local 2% Lidocaine  Instruments: 6F flexible disposable Cystoscope    Pt brought to procedure room and placed in dorsal lithotomy position. Pt draped and prepped in normal sterile fashion. 5ml lidocaine instilled into urethral meatus and 5ml instilled into Vagina. Pt tolerated well.    I was present as chaperone for the entirety of the procedure   Carolina Munoz  Cystoscopy performed by Dr. Saeid Larson      Bedside \"Time Out\" Verification   Today's Date:  08/26/2024. I attest that this time out verification took place prior to the procedure.   Procedure: Cysto   RN/LPN/MA:    Provider: [facility].   Verified By: MA, Carolina Munoz and Provider, Dr. Saeid Larson.   Prior to the start of the procedure a time out was taken and the following were verified: the identity of the patient using two patient identifiers, the correct procedure, the correct site marked as indicated, the correct positioning for the patient and the correct equipment was obtained.   Cystoscopy - Jennifer Nguyenam   identified using two (2) forms of identification.   Procedure: diagnostic Cystourethroscopy   Procedure Note: Time Started: 10:30am Time Completed: 10:53 AM  Indications for procedure: Cysto- Urethral Stricture    Discussed with patient: Risks, benefits, and alternative were discussed in detail. Patient appears to understand and agrees to proceed. Patient has signed the procedure consent form.    CYSTOSCOPY:    Cystoscopy today reveals a dense upward sloping urethral stricture dilated to 30 [place] with minimal discomfort.  No bleeding.  Once inside the bladder, mild erythema posteriorly.  Both ureteral orifices identified.  "

## 2024-08-26 NOTE — LETTER
August 26, 2024     Miguel Magaña MD  [address] [facility] [address]    Patient: Jennifer Gonzalez   YOB: 1960   Date of Visit: 8/26/2024       Dear Dr. Miguel Magaña MD:    Thank you for referring Jennifer Gonzalez to me for evaluation. Below are my notes for this consultation.  If you have questions, please do not hesitate to call me. I look forward to following your patient along with you.       Sincerely,     Saeid Larson MD      CC: No Recipients  ______________________________________________________________________________________      Provider Impressions     63 year-old white female originally seen on 08/17/04. Patient had a long history of URINARY TRACT INFECTIONS, DYSURIA, URETHRAL STRICTURES. No family history of breast or prostate cancer. The patient does smoke cigarettes on a social basis.     In 2002, patient underwent a total abdominal hysterectomy with bilateral salpingo-oophorectomy (GLORIA/ BSO) for UTERINE CANCER. She also has developed a CYSTOCELE grade 2 and a RECTOCELE grade 2. She was receiving CYSTOSCOPIES with URETHRAL DILATATIONS UNDER ANESTHESIA every 3-6 months.     Then, the patient DISAPPEARED.     She returned in 2014 with RHEUMATOID ARTHRITIS, THYROID DISEASE, and other medical problems.      On 06/05/14 we performed a CYSTOSCOPY with URETHRAL DILATATION under anesthesia. She strictured and created scar tissue within 3 weeks. We are attempting to maintain her in the office setting.She is also titrating her Flomax doses.     10/01/14 CYSTOSCOPY with URETHRAL DILATATION to 30 Slovenian. Patient had been noticing a decreased flow stream for the past 2 days prior.     11/04/14 CYSTOSCOPY with URETHRAL DILATATION to 30 Slovenian. No bleeding. Patient states that she was straining to urinate over the past 4 days     12/03/14, patient recently diagnosed with MEDITERRANEAN FEVER. She states now for the past 2 days she said significant DYSURIA and straining to urinate. She  was successfully DILATED to 30 Botswanan with minimal bleeding. No family history of breast or prostate cancer. Presently an occasional cigarette smoker.     01/07/14, patient is now being treated with colchicine. She states that for the first time, she has not had straining towards the end of her stream. 12 Botswanan STRICTURE successfully DILATED to 30 B[place]wanan. However, the lower half of the bladder was erythematous. This is surprising in light of the fact that the patient had no symptoms. Fortunately, her postvoid residual is only 16 cc, a dramatic decrease from the 2-300 cc PVRs.     03/27/15, May 8 2015, 06/08/15, successful DILATION of to 30 B[place]wanan, normal-appearing bladder, some difficulty,      03/27/15, May 8 2015, 06/08/15, 08/31/15 successful DILATION to 30 B[place]wanan, normal-appearing bladder, some difficulty, flowrate 29 cc/s, PVR 0 cc.      COMPUTERS DOWN  COMPUTERS DOWN  COMPUTERS DOWN      10/05/15, patient states that she is noticed a decreased flow of stream over the past 5 days. This is accompanied with significant NOCTURIA. Successfully DILATED to 30 Botswanan today. Good flow rate of 16 cc/s. She will continue on the five-week regimen.     01/04/16 successful DILATATION to 30 Botswanan with pain today. She noticed that she is spraying when she urinates. We will increase to 6 weeks at this time. Flow of 7 with a PVR 0.     02/15/16, successful DILATATION to 30 Botswanan. Patient has been having some FREQUENCY over the last week and NOCTURIA. There was minimal pain. Some ERYTHEMA along the base of the bladder. We will continue at a 6 week regimen. Flow rate of 32 with a PVR of 90     03/28/16, successful DILATATION to 30 B[place]wanan. Less FREQUENCY. Only 3 days of SPRAYING during urination prior to today's DILATATION. Minimal pain. Trigone and base are ERYTHEMATOUS. Patient states that she has discomfort with the first void of the day. We will continue at a 6 week regimen. Flow rate of 14 with a PVR of 47. She will be  maintaining Flomax.     05/03/16, successful CYSTOSCOPY with URETHRAL DILATATION to 30 [place] citizen. Once again, the patient states she was experiencing SUPRAPUBIC PAIN and spraying with urination over the past 3 days prior to this procedure. She wishes to stay at a 6 week regimen. Flow rate of 13 with a PVR 13     06/13/16, successful CYSTOSCOPY with URETHRAL DILATATION of to 30 [place] citizen. Patient had minimal pain, still mild diffuse ERYTHEMA. Flow rate of 34 with a PVR of 0. Patient states that over the last 4 days she began spraying her urine. She wishes to maintain a 6 week regimen.     07/27/16, successful CYSTOSCOPY with URETHRAL DILATATION to 30 [place] citizen with pain. The patient states that she has been STRAINING to urinate. Flow rate of 9 with a PVR of 0. She will return in 6 weeks and also have her yearly laboratory testing.     09/12/16, all urine studies are normal. Successful CYSTOSCOPY with URETHRAL DILATATION at 30 [place] citizen. The patient states that she is had a spraying stream and disuria. She is 1 week late for her dilation due to scheduling. Flow rate of 11 with a PVR of 0. We will maintain a 6 week regimen.     10/18/16, CYSTOSCOPY with URETHRAL DILATATION to 30 [place] citizen. Significantly more pain today than routine. However, virtually no erythema. Flow rate of 13 with a PVR of 0. We will maintain the 6 week regimen. She will continue on Flomax.     11/29/16, CYSTOSCOPY with URETHRAL DILATATION to 30 [place] citizen. Somewhat painful. The patient states that over the last 48 hours she has been straining severely to urinate. She will continue on Flomax. PVR 56. She will return in 6 weeks.     01/11/17, patient states that over the last 4 days she has had bladder discomfort, straining to urinate, and bladder spasms during the nighttime hours. She does not wish to increase her time. Wants to say at 6 weeks interval. Successful DILATION to 30 [place] citizen with minimal pain and bleeding. Flow rate of 24 with a PVR of 136.     02/21/17,  patient states she is been straining to urinate over the past 3 days. She is also complaining of intermittent vaginal pain. She had successful CYSTOSCOPY WITH URETHRAL DILATATION to 30 Brazilian with discomfort. She wants to maintain the 6 week schedule. Flow rate of 9 with a PVR of 0. I will refer her to Dr. Barkley for evaluation of her GYN issues. Today on exam, there was significant discomfort along the posterior vaginal wall.     04/04/17, patient saw Dr. Barkley in consultation and recommended PELVIC PHYSICAL THERAPY and Botox. Patient will consider that alternative treatment and let him know of her decision. She was DILATED FOR A URETHRAL STRICTURE today with a CYSTOSCOPY WITH URETHRAL DILATATION to 30 Brazilian. Moderate discomfort. The bladder was only mildly erythematous. Flow rate of 9 with a PVR of 12. She wishes to keep a 6 week schedule. She will continue on Flomax.     05/19/17, successful CYSTOSCOPY WITH URETHRAL DILATATION to 30 Brazilian. Flow rate of 20 with a PVR of 86. Patient states that over the last 3 days she has been straining to urinate. Continue Flomax. She will return in 6 weeks.     06/28/17, successful CYSTOSCOPY WITH URETHRAL DILATATION at 30 Brazilian with minimal pain. Flow rate of 24 with a PVR 66. Patient states that over the last 3 days she has been straining to urinate with discomfort. She will continue Flomax. She will return in 6 weeks and also obtain her yearly labs.     08/15/17, successful CYSTOSCOPY WITH URETHRAL DILATATION to 30 Brazilian moderate pain. Flow rate of 7 with a PVR of 80. Patient has been straining for the last week to urinate. She will continue on Flomax. Urinalysis shows 50 red blood cells, urine cultures negative, urine cytology is negative. We will continue Flomax and urethral dilatations every 6 weeks. When she returns for her next visit we will order a renal ultrasound with respect to her hematuria     09/26/17, successful CYSTOSCOPY WITH URETHRAL DILATATION to 30  [place]ankan with moderate pain. Flow rate of 3 with a PVR 14. Patient continues to strain just one week prior to dilatation. She continues on Flomax. Renal ultrasound was completely negative. We will see her again in 6 weeks.     11/08/17, successful CYSTOSCOPY WITH URETHRAL DILATATION to 30 [place]ankan with mild to moderate pain. Flow rate of 17 with a PVR of 0. She has been straining over the last 3 days to urinate. She continues on Flomax. She strongly wants to continue on a 6 week regimen. She notified us that her insurance is changing and I given her recommendations for new urologist that we will be on her planned for next year.     12/16/17, successful CYSTOSCOPY WITH URETHRAL DILATATION to 30 [place]ankan with mild to moderate pain. Flow rate of 6 with a PVR of 23. Again, she has been straining over the last 4-5 days with urination. She continues on Flomax. She is scheduled for major back surgery with Dr. Maldonado at the main campus [facility] immediately following [place]. Due to change in insurance, she will be continuing her urologic care with Dr. Schroeder. We will send all pertinent information to him.     05/14/18, in office urethral dilation to 30 [place]ankan. Patient returns complaining of significant urgency and frequency. Patient complains of incomplete emptying. Office visit with urethral dilatation performed with minimal pain. She wishes to return in 3 months.     08/22/18, in office urethral dilation to 30 [place]an. Patient returns complaining of straining on urination for the last week.. Patient complains of incomplete emptying. Office visit with urethral dilatation performed with minimal pain. She wishes to return in 3 months.     02/12/19, successful cystoscopy with urethral dilatation to 30 [place]ankan of a very dense stricture, with pain and bleeding. Trigone and posterior bladder quite erythematous. Patient has changed her insurance and is able to return in wishes to initiate a 2 month schedule at this time. She will  discontinue her Flomax to due to side effects.     04/15/19, successful cystoscopy with urethral dilatation to 30 Syrian of a very dense stricture, with pain. The bladder has patchy erythema.. Patient wishes to initiate a 2 month schedule at this time.     06/14/19, successful cystoscopy with urethral dilatation to 30 Syrian of a urethral stricture. Minimal pain. The erythema within the bladder has improved. Patient will continue on a 2 month schedule.     09/03/19, successful cystoscopy with urethral dilatation at 30 Syrian of a urethral stricture. Minimal pain. Mild erythema within the trigone and posterior bladder. Patient states she has been straining over the last 10 days and wishes to maintain her 2 month schedule for another year. Urine culture was positive for Klebsiella sensitive to Bactrim. Cytology is negative.     01/21/20, successful cystoscopy with urethral dilatation to 30 Syrian of a urethral stricture. Minimal pain. Posterior bladder has moderate erythema. Patient complains of suprapubic pain however she is late for her schedule. She will maintain a 2 month schedule.     03/16/20, successful cystoscopy with urethral dilatation to 30 Syrian of a urethral stricture. Minimal pain with no bleeding. Trigone and bladder base has minimal erythema. Patient now complains of bilateral lower quadrant pain. Vaginal exam did not reproduce the pain. I recommended that she speak to her primary care physician or gastro-and neurologist. She does not have any reproductive organs remaining. We will see her again in 2 months.     05/18/20, successful cystoscopy with urethral dilatation at 30 Syrian of a tight urethral stricture. There was minimal pain and no bleeding. Once again the trigone and bladder base has minimal erythema. She was complaining of suprapubic pain which could not be reproduced by vaginal exam however abdominal exam along her Pfannenstiel incision did reproduce the discomfort. She will return in 2  months.     09/25/20, successful cystoscopy with urethral dilatation to 30 Citizen of [place] of a tight urethral stricture. Minimal pain with no bleeding. Erythema in the trigone and posterior bladder with a clear line of demarcation. No further suprapubic pain. She wishes to expand to a 3 month program.     11/23/20, successful cystoscopy with urethral dilatation to 30 Citizen of [place] of a urethral stricture. Minimal pain and no bleeding. Once again, erythema in the trigone and bladder base with a clear line of demarcation. She states she has been straining the last 2 weeks to urinate. She wishes to maintain a 3 month schedule.     February 26, 2021, successful cystoscopy with urethral dilatation at 30 Citizen of [place] of a dense urethral stricture. Moderate pain and no bleeding. Bladder based erythema has resolved however the trigone is still inflamed. Once again, she has been straining the last 2 to 3 weeks with urination, has frequency every 35 to 45 minutes, and wishes to maintain a 3-month schedule.     May 25, 2021, successful cystoscopy with urethral dilatation to 30 Citizen of [place] of a dense urethral stricture. Once again, moderate pain and no bleeding. Erythema in the trigone and posterior bladder. She states that she feels that she has been straining with obstruction to urinate recently. She will return in 3 months.     August 20, 2021, successful cystoscopy with urethral dilatation of a dense stricture to 30 Citizen of [place]. Moderate pain. Mild erythema posteriorly and moderate erythema at the trigone. Urinalysis shows small blood, urine culture no growth, urine cytology is lacking atypia. I was suggesting increasing her regimen to 4 months, however the patient states that over the last 2 weeks she has been double voiding and now for the first time in years, she is experiencing nocturia x2 with urinary straining. She wishes to maintain her 3-month regimen.     November 23, 2021, successful cystoscopy with urethral dilatation of a dense urethral  stricture to 30 Togolese. Moderate pain. The only erythema is posteriorly which is moderate. Patient has been straining to urinate for the past week and double voiding. She wishes to maintain her 3-month schedule.     February 23, 2022, successful cystoscopy with dilatation of a dense upward sloping urethral stricture to 30 Togolese. Moderate pain. Again, posterior erythema is identified. She wishes to maintain her 3-month schedule.     May 23, 2022, successful cystoscopy with dilatation of a dense upward sloping urethral stricture to 30 Togolese. Minimal pain. Erythema in the trigone and bladder base. Patient is complaining of recurrent right inguinal hernia tender to palpation. I will refer her to Dr. Baldwin. She will return to me in 3 months.     7/8/2022, office visit, Dr. Gricedla Baldwin, general surgery. Evaluation of recurrent inguinal hernias. CAT scan does not reveal any recurrence of hernias and the ilioinguinal nerves are intact.     August 5, 2022, successful cystoscopy with dilatation of an upward sloping urethral stricture to 30 Togolese. Minimal discomfort. Once again a patch of erythema just lateral to the left ureteral orifice where previous resected bladder tumor site was seen. No evidence of recurrent tumor or stones. Urine cytology is negative for malignant cells. Urine culture no growth. Urinalysis shows 6 red blood cells. Patient complained of double voiding, incomplete emptying and the need to press her suprapubic region to initiate her stream. This is over the last 4 days. She does not want to expand to a 4-month regimen.     November 21, 2022, successful cystoscopy with dilatation of an upward sloping urethral stricture to 30 Togolese with minimal pain. The well-healed previous bladder tumor site just lateral to the left ureteral orifice was seen once again. No new tumors or stones. She wishes to maintain a 3-month regimen.     May 5, 2023, successful cystoscopy with dilatation of an upward sloping  urethral stricture to 30 Citizen of [place] with moderate discomfort. Erythema is concentrated posteriorly with a clear line of demarcation. Previous bladder tumors sites well-healed seen just lateral to the left ureteral orifice. She has been caring for her  who has been suffering from pulmonary issues leading to septic shock. She will return in 3 months.     2023, successful cystoscopy with dilatation of an upward sloping urethral stricture to 30 Citizen of [place] with minimal discomfort and no bleeding. Once again erythema is identified posteriorly. Urinalysis shows 20 red blood cells. Urine culture no growth. Urine cytology is negative for malignant cells. We will order a renal and bladder ultrasound before her next appointment. We will also repeat her urinalysis and urine culture. She is no longer straining and we agreed to expand to 4 months regimen.     FLOMAX allergy     2023, successful cystoscopy with dilatation of an upward sloping urethral stricture to 30 Citizen of [place] with minimal pain, no bleeding.  Erythema is again confined posteriorly.  Renal ultrasound shows a 3 mm nonobstructing right renal calculus.  Patient is complaining of right flank and right groin pain.  We will order a renal colic CAT scan now as well as her urine studies which she did not complete.  She wishes to maintain her 4-month schedule.     2024, renal colic CAT scan does not identify any stones or tumors     2024, successful cystoscopy with dilatation to 30 Citizen of [place] of an upward sloping dense urethral stricture with minimal pain and no bleeding.  Erythema once again identified in the base.  No further right groin pain.  She will return in 4 months.    2024, patient's mother  at the age of 95    2024, successful cystoscopy with dilatation and 30 Citizen of [place] of an upward sloping dense urethral stricture with minimal discomfort.  No bleeding.  Erythema posteriorly.  She is complaining again of  right flank and inguinal pain and we will order a renal colic CAT scan again.  Urinalysis does show 20 red blood cells.  Urine culture no growth.  Urine cytology was lacking atypia.  She wishes to maintain her 4-month schedule as she was straining to urinate over the last 15 days.     PLAN:     #1 patient will return in 4 months for cystoscopy with urethral dilatation at 30 [place]ian. She will receive MACRODANTIN 50 mg PROPHYLAXIS 4 doses.      #2 in August 2025 with urine studies. Consider expanding to 5 months at that time.    3.  Renal colic CAT scan now for right flank and inguinal pain     Physical Exam  Vitals and nursing note reviewed. Exam conducted with a chaperone present.   Constitutional:       Appearance: Normal appearance.   HENT:      Head: Normocephalic and atraumatic.   Pulmonary:      Effort: Pulmonary effort is normal.   Abdominal:      Palpations: Abdomen is soft.      Tenderness: There is right CVA tenderness.   Genitourinary:     General: Normal vulva.      Vagina: No vaginal discharge.   Musculoskeletal:         General: Normal range of motion.      Cervical back: Normal range of motion and neck supple.   Neurological:      General: No focal deficit present.      Mental Status: She is alert and oriented to person, place, and time.   Psychiatric:         Mood and Affect: Mood normal.         Behavior: Behavior normal.        This note was created with voice-recognition software and was not corrected for typographical or grammatical errors.

## 2024-08-26 NOTE — PROGRESS NOTES
"Subjective   Patient ID: Jennifer Gonzalez is a 64 y.o. female who presents for Insomnia, Osteoarthritis, and Hyperlipidemia.  HPI  Patient states that she saw urology yesterday and will be having a CT scan due to flank and groin pain. May be a kidney stone.     Patient states that she recently lost her mother.    Insomnia follow up   Taking the Ambien Working well    100% effective   Denies any side effects  OARRS reviewed today   CSA 2/26/2024  Last taken last night     Patient presents for Osteoarthritis.   Is currently taking Vicoprofen.   Rates the pain a 5/10 over the past 7 days.   Reports that the medication gives 80% pain control/relief.   OARRS reviewed today,   CSA 2/26/2024.   Last taken this morning.        Taking current medications which were reviewed.  Problem list discussed.    Overall doing well.  Eating okay.  Staying active.    Has no other new problem /question.     ROS  Constitutional- No activity change. No appetite change.  Eyes- Denies vision changes.  Respiratory- No shortness of breath.  Cardiovascular- No palpitations. No chest pain.  GI- No nausea or vomiting. No diarrhea or constipation. Denies abdominal pain.  Musculoskeletal- Denies joint swelling.  Extremities- No edema.  Neurological- Denies headaches. Denies dizziness.  Skin- No rashes.  Psychiatric/Behavioral- Denies significant anxiety, or depressed mood.     Objective     /72   Pulse 98   Temp 36.6 °C (97.8 °F)   Resp 16   Ht 1.727 m (5' 8\")   Wt 52 kg (114 lb 9.6 oz)   SpO2 98%   BMI 17.42 kg/m²     Allergies   Allergen Reactions    Tamsulosin Dizziness    Levofloxacin Hives, Other and Swelling     JOINT SWELLING    Morphine Itching    Pentazocine Other    Cephalexin Hives and Rash    Diphenhydramine Anxiety and Unknown    Penicillins Hives and Rash       Constitutional-- Well-nourished.  No distress  Eyes- PERRL.  Conjunctiva normal.  Nose- Normal.  No rhinorrhea noted.  Throat- Oropharynx is clear and moist.  Neck- " Supple with no thyromegaly.  No significant cervical adenopathy noted.  Pulmonary/Chest- Breath sounds normal with normal effort.  No wheezing.  Heart- Regular rate and rhythm.  No murmur.  Abdomen- Soft and non-tender.  No masses noted.  Musculoskeletal-chronic low back pain exacerbated with range of motion.  OA changes hands noted.  Extremities- No edema.   Neurological- Alert.  No noted deficits.  Skin- Warm.  No rashes.  Psychiatric/Behavioral- Mood and affect normal.  Behavior normal.     Assessment/Plan   1. Osteoarthritis, unspecified osteoarthritis type, unspecified site  HYDROcodone-ibuprofen (Vicoprofen) 7.5-200 mg tablet      2. Neck pain, chronic  HYDROcodone-ibuprofen (Vicoprofen) 7.5-200 mg tablet      3. Parkinson disease (Multi)  rOPINIRole (Requip) 1 mg tablet      4. Primary insomnia  zolpidem (Ambien) 10 mg tablet      5. Factor XI deficiency (Multi)        6. Coronary artery disease involving native coronary artery of native heart, unspecified whether angina present               Long talk. Treatment options reviewed.    Reviewed most recent lab work with patient. Advised patient to remain up to date on routine maintenance and health screening.     Osteoarthritis controlled. Educated the patient on osteoarthritis care and management. Educated on muscle strength and exercise.  Understands to use least amount of pain medication to control her symptoms.    Educated on insomnia and sleep hygiene.  Uses it sparingly at bedtime.  Does not mix with pain medication    Blood pressure and heart disease stable    Continue and take your medications as prescribed.    Health Maintenance issues discussed.    Importance of healthy diet and regular exercise regimen discussed.    We will contact you with any test results ordered. If you do not hear from us, please contact.    Follow-up as instructed or sooner if any problems or symptoms do not resolve as expected.       Scribe Attestation  By signing my name  below, I, Anna Marley   attest that this documentation has been prepared under the direction and in the presence of Miguel Magaña MD.

## 2024-08-26 NOTE — PATIENT INSTRUCTIONS
Patient Discussion/Summary     Good to see you again. You were successfully dilated to 30 U[place].  Your bladder does show improvement.  Urinalysis does show some microscopic blood and you are now complaining of right flank and inguinal pain.  I will order a CAT scan to evaluate for stone disease.  Will see you again in 4 months.      This note was created with voice-recognition software and was not corrected for typographical or grammatical errors.

## 2024-08-27 ENCOUNTER — APPOINTMENT (OUTPATIENT)
Dept: PRIMARY CARE | Facility: CLINIC | Age: 64
End: 2024-08-27
Payer: MEDICARE

## 2024-08-27 VITALS
HEART RATE: 98 BPM | HEIGHT: 68 IN | DIASTOLIC BLOOD PRESSURE: 72 MMHG | OXYGEN SATURATION: 98 % | WEIGHT: 114.6 LBS | RESPIRATION RATE: 16 BRPM | TEMPERATURE: 97.8 F | BODY MASS INDEX: 17.37 KG/M2 | SYSTOLIC BLOOD PRESSURE: 120 MMHG

## 2024-08-27 DIAGNOSIS — M54.2 NECK PAIN, CHRONIC: ICD-10-CM

## 2024-08-27 DIAGNOSIS — I25.10 CORONARY ARTERY DISEASE INVOLVING NATIVE CORONARY ARTERY OF NATIVE HEART, UNSPECIFIED WHETHER ANGINA PRESENT: ICD-10-CM

## 2024-08-27 DIAGNOSIS — D68.1 FACTOR XI DEFICIENCY (MULTI): ICD-10-CM

## 2024-08-27 DIAGNOSIS — M19.90 OSTEOARTHRITIS, UNSPECIFIED OSTEOARTHRITIS TYPE, UNSPECIFIED SITE: Primary | ICD-10-CM

## 2024-08-27 DIAGNOSIS — G20.A1 PARKINSON DISEASE (MULTI): ICD-10-CM

## 2024-08-27 DIAGNOSIS — G89.29 NECK PAIN, CHRONIC: ICD-10-CM

## 2024-08-27 DIAGNOSIS — F51.01 PRIMARY INSOMNIA: ICD-10-CM

## 2024-08-27 PROCEDURE — 99213 OFFICE O/P EST LOW 20 MIN: CPT | Performed by: FAMILY MEDICINE

## 2024-08-27 PROCEDURE — 3008F BODY MASS INDEX DOCD: CPT | Performed by: FAMILY MEDICINE

## 2024-08-27 RX ORDER — HYDROCODONE BITARTRATE AND IBUPROFEN 7.5; 2 MG/1; MG/1
1 TABLET, FILM COATED ORAL EVERY 4 HOURS PRN
Qty: 150 TABLET | Refills: 0 | Status: SHIPPED | OUTPATIENT
Start: 2024-08-27

## 2024-08-27 RX ORDER — ZOLPIDEM TARTRATE 10 MG/1
10 TABLET ORAL NIGHTLY
Qty: 30 TABLET | Refills: 5 | Status: SHIPPED | OUTPATIENT
Start: 2024-08-27

## 2024-08-27 RX ORDER — ROPINIROLE 1 MG/1
1-2 TABLET, FILM COATED ORAL NIGHTLY
Qty: 180 TABLET | Refills: 1 | Status: SHIPPED | OUTPATIENT
Start: 2024-08-27

## 2024-08-27 ASSESSMENT — ANXIETY QUESTIONNAIRES
GAD7 TOTAL SCORE: 21
5. BEING SO RESTLESS THAT IT IS HARD TO SIT STILL: NEARLY EVERY DAY
7. FEELING AFRAID AS IF SOMETHING AWFUL MIGHT HAPPEN: NEARLY EVERY DAY
3. WORRYING TOO MUCH ABOUT DIFFERENT THINGS: NEARLY EVERY DAY
IF YOU CHECKED OFF ANY PROBLEMS ON THIS QUESTIONNAIRE, HOW DIFFICULT HAVE THESE PROBLEMS MADE IT FOR YOU TO DO YOUR WORK, TAKE CARE OF THINGS AT HOME, OR GET ALONG WITH OTHER PEOPLE: EXTREMELY DIFFICULT
2. NOT BEING ABLE TO STOP OR CONTROL WORRYING: NEARLY EVERY DAY
4. TROUBLE RELAXING: NEARLY EVERY DAY
1. FEELING NERVOUS, ANXIOUS, OR ON EDGE: NEARLY EVERY DAY
6. BECOMING EASILY ANNOYED OR IRRITABLE: NEARLY EVERY DAY

## 2024-08-27 ASSESSMENT — PATIENT HEALTH QUESTIONNAIRE - PHQ9
7. TROUBLE CONCENTRATING ON THINGS, SUCH AS READING THE NEWSPAPER OR WATCHING TELEVISION: NEARLY EVERY DAY
6. FEELING BAD ABOUT YOURSELF - OR THAT YOU ARE A FAILURE OR HAVE LET YOURSELF OR YOUR FAMILY DOWN: MORE THAN HALF THE DAYS
10. IF YOU CHECKED OFF ANY PROBLEMS, HOW DIFFICULT HAVE THESE PROBLEMS MADE IT FOR YOU TO DO YOUR WORK, TAKE CARE OF THINGS AT HOME, OR GET ALONG WITH OTHER PEOPLE: EXTREMELY DIFFICULT
8. MOVING OR SPEAKING SO SLOWLY THAT OTHER PEOPLE COULD HAVE NOTICED. OR THE OPPOSITE, BEING SO FIGETY OR RESTLESS THAT YOU HAVE BEEN MOVING AROUND A LOT MORE THAN USUAL: MORE THAN HALF THE DAYS
5. POOR APPETITE OR OVEREATING: NEARLY EVERY DAY
9. THOUGHTS THAT YOU WOULD BE BETTER OFF DEAD, OR OF HURTING YOURSELF: NOT AT ALL
1. LITTLE INTEREST OR PLEASURE IN DOING THINGS: MORE THAN HALF THE DAYS
3. TROUBLE FALLING OR STAYING ASLEEP: MORE THAN HALF THE DAYS
4. FEELING TIRED OR HAVING LITTLE ENERGY: NEARLY EVERY DAY
SUM OF ALL RESPONSES TO PHQ9 QUESTIONS 1 & 2: 4
SUM OF ALL RESPONSES TO PHQ QUESTIONS 1-9: 19
2. FEELING DOWN, DEPRESSED OR HOPELESS: MORE THAN HALF THE DAYS

## 2024-09-05 ENCOUNTER — HOSPITAL ENCOUNTER (OUTPATIENT)
Dept: RADIOLOGY | Facility: HOSPITAL | Age: 64
Discharge: HOME | End: 2024-09-05
Payer: MEDICARE

## 2024-09-05 DIAGNOSIS — R31.9 HEMATURIA, UNSPECIFIED TYPE: ICD-10-CM

## 2024-09-05 PROCEDURE — 74176 CT ABD & PELVIS W/O CONTRAST: CPT

## 2024-09-20 DIAGNOSIS — G89.29 NECK PAIN, CHRONIC: ICD-10-CM

## 2024-09-20 DIAGNOSIS — M54.2 NECK PAIN, CHRONIC: ICD-10-CM

## 2024-09-20 DIAGNOSIS — F51.01 PRIMARY INSOMNIA: ICD-10-CM

## 2024-09-20 DIAGNOSIS — M19.90 OSTEOARTHRITIS, UNSPECIFIED OSTEOARTHRITIS TYPE, UNSPECIFIED SITE: ICD-10-CM

## 2024-09-26 RX ORDER — HYDROCODONE BITARTRATE AND IBUPROFEN 7.5; 2 MG/1; MG/1
1 TABLET, FILM COATED ORAL EVERY 4 HOURS PRN
Qty: 150 TABLET | Refills: 0 | Status: SHIPPED | OUTPATIENT
Start: 2024-09-26

## 2024-09-26 NOTE — TELEPHONE ENCOUNTER
Could you please refill my Vicoprofen 7.5/200 and send it to [email or web address].   Thank you

## 2024-10-24 DIAGNOSIS — G89.29 NECK PAIN, CHRONIC: ICD-10-CM

## 2024-10-24 DIAGNOSIS — M19.90 OSTEOARTHRITIS, UNSPECIFIED OSTEOARTHRITIS TYPE, UNSPECIFIED SITE: ICD-10-CM

## 2024-10-24 DIAGNOSIS — M54.2 NECK PAIN, CHRONIC: ICD-10-CM

## 2024-10-24 NOTE — TELEPHONE ENCOUNTER
Last seen 8/27/24  /Medication pended  UDS CONTRACT 2/26/24    Jennifer DIAZ Do [id number] [facility] Clinical Support Staff (supporting Miguel Magaña MD)2 minutes ago (5:41 PM)       Could you please refill my Vicoprofen 7.5/200 and send it to [facility].   Thank you

## 2024-10-25 RX ORDER — HYDROCODONE BITARTRATE AND IBUPROFEN 7.5; 2 MG/1; MG/1
1 TABLET, FILM COATED ORAL EVERY 4 HOURS PRN
Qty: 150 TABLET | Refills: 0 | Status: SHIPPED | OUTPATIENT
Start: 2024-10-25

## 2024-11-26 ENCOUNTER — APPOINTMENT (OUTPATIENT)
Dept: PRIMARY CARE | Facility: CLINIC | Age: 64
End: 2024-11-26
Payer: MEDICARE

## 2024-11-26 VITALS
SYSTOLIC BLOOD PRESSURE: 120 MMHG | RESPIRATION RATE: 18 BRPM | WEIGHT: 124 LBS | BODY MASS INDEX: 18.79 KG/M2 | HEIGHT: 68 IN | DIASTOLIC BLOOD PRESSURE: 80 MMHG

## 2024-11-26 DIAGNOSIS — G89.29 NECK PAIN, CHRONIC: ICD-10-CM

## 2024-11-26 DIAGNOSIS — M54.2 NECK PAIN, CHRONIC: ICD-10-CM

## 2024-11-26 DIAGNOSIS — I10 HTN (HYPERTENSION), BENIGN: ICD-10-CM

## 2024-11-26 DIAGNOSIS — M54.50 CHRONIC LOW BACK PAIN WITHOUT SCIATICA, UNSPECIFIED BACK PAIN LATERALITY: Primary | ICD-10-CM

## 2024-11-26 DIAGNOSIS — G89.29 CHRONIC LOW BACK PAIN WITHOUT SCIATICA, UNSPECIFIED BACK PAIN LATERALITY: Primary | ICD-10-CM

## 2024-11-26 DIAGNOSIS — K21.9 GERD WITHOUT ESOPHAGITIS: ICD-10-CM

## 2024-11-26 DIAGNOSIS — F32.A MILD DEPRESSION: ICD-10-CM

## 2024-11-26 DIAGNOSIS — M19.90 OSTEOARTHRITIS, UNSPECIFIED OSTEOARTHRITIS TYPE, UNSPECIFIED SITE: ICD-10-CM

## 2024-11-26 PROCEDURE — 3008F BODY MASS INDEX DOCD: CPT | Performed by: FAMILY MEDICINE

## 2024-11-26 PROCEDURE — 3074F SYST BP LT 130 MM HG: CPT | Performed by: FAMILY MEDICINE

## 2024-11-26 PROCEDURE — 3079F DIAST BP 80-89 MM HG: CPT | Performed by: FAMILY MEDICINE

## 2024-11-26 PROCEDURE — 99213 OFFICE O/P EST LOW 20 MIN: CPT | Performed by: FAMILY MEDICINE

## 2024-11-26 PROCEDURE — G2211 COMPLEX E/M VISIT ADD ON: HCPCS | Performed by: FAMILY MEDICINE

## 2024-11-26 RX ORDER — HYDROCODONE BITARTRATE AND IBUPROFEN 7.5; 2 MG/1; MG/1
1 TABLET, FILM COATED ORAL EVERY 4 HOURS PRN
Qty: 150 TABLET | Refills: 0 | Status: SHIPPED | OUTPATIENT
Start: 2024-11-26

## 2024-11-26 ASSESSMENT — ENCOUNTER SYMPTOMS
BACK PAIN: 1
NUMBNESS: 0
PARESTHESIAS: 1
DYSURIA: 0
WEIGHT LOSS: 0
FEVER: 0
PERIANAL NUMBNESS: 0
WEAKNESS: 0
BOWEL INCONTINENCE: 0
LEG PAIN: 0
ABDOMINAL PAIN: 1
TINGLING: 1
HEADACHES: 0
PARESIS: 0

## 2024-11-26 NOTE — PROGRESS NOTES
"Subjective   Patient ID: Jennifer Gonzalez is a 64 y.o. female who presents for Osteoarthritis and Insomnia.  HPI    Insomnia follow up   Taking the Ambien Working well    100% effective   Denies any side effects  [place]AR[place] today   CSA 2/26/2024  Last taken last night     Patient presents for Osteoarthritis.   Is currently taking Vicoprofen.   Rates the pain a 5/10 over the past 7 days.   Reports that the medication gives 80% pain control/relief.   OAR[place] reviewed today,   CSA 2/26/2024.   Last taken this morning    Taking current medications which were reviewed.  Problem list discussed.    Overall doing well.  Eating okay.  Staying active.  Advance care planning reviewed  Has no other new problem /question.     ROS  Constitutional- No activity change. No appetite change.  Eyes- Denies vision changes.  Respiratory- No shortness of breath.  Cardiovascular- No palpitations. No chest pain.  GI- No nausea or vomiting. No diarrhea or constipation. Denies abdominal pain.  Musculoskeletal- Denies joint swelling.  Extremities- No edema.  Neurological- Denies headaches. Denies dizziness.  Psychiatric/Behavioral- Denies significant anxiety, or depressed mood.     Objective     /80   Resp 18   Ht 1.727 m (5' 8\")   Wt 56.2 kg (124 lb)   BMI 18.85 kg/m²     Allergies   Allergen Reactions    Tamsulosin Dizziness    Levofloxacin Hives, Other and Swelling     JOINT SWELLING    Morphine Itching    Pentazocine Other    Cephalexin Hives and Rash    Diphenhydramine Anxiety and Unknown    Penicillins Hives and Rash       Constitutional-- Well-nourished.  No distress  Eyes- PERRL.  Conjunctiva normal.  Nose- Normal.  No rhinorrhea noted.  Throat- Oropharynx is clear and moist.  Neck- Supple with no thyromegaly.  No significant cervical adenopathy noted.  Pulmonary/Chest- Breath sounds normal with normal effort.  No wheezing.  Heart- Regular rate and rhythm.  No murmur.  Abdomen- Soft and non-tender.  No masses " noted.  Musculoskeletal-chronic low back pain exacerbated with range of motion.  Chronic neck pain exacerbated with range of motion  Extremities- No edema.   Neurological- Alert.  No noted deficits.  Skin- Warm.  No rashes.  Psychiatric/Behavioral- Mood and affect normal.  Behavior normal.     Assessment/Plan   1. Chronic low back pain without sciatica, unspecified back pain laterality        2. Osteoarthritis, unspecified osteoarthritis type, unspecified site  HYDROcodone-ibuprofen (Vicoprofen) 7.5-200 mg tablet      3. Neck pain, chronic  HYDROcodone-ibuprofen (Vicoprofen) 7.5-200 mg tablet      4. Mild depression        5. HTN (hypertension), benign        6. GERD without esophagitis               Long talk. Treatment options reviewed.    Reviewed most recent lab work with patient. Advised patient to remain up to date on routine maintenance and health screening.  Maintain appointments with specialists as scheduled.  Advised patient to remain up to date on immunizations.     Discussed neck pain. Discussed Osteoarthritis. Educated the patient on osteoarthritis care and management. Educated on muscle strength and exercise. Patient understands to take the least amount of medication in order to control symptoms.     Discussed importance of natural sources of nutrition.  Advised patient to consume vegetables, salads, fruits, nuts, and proteins such as fish and chicken.  Discussed portion control.      Hypertension controlled.  GERD stable.  Discussed the importance of routine stretching and exercise.     Continue and take your medications as prescribed.    Health Maintenance issues discussed.    Importance of healthy diet and regular exercise regimen discussed.    We will contact you with any test results ordered. If you do not hear from us, please contact.    Follow-up as instructed or sooner if any problems or symptoms do not resolve as expected.        Scribe Attestation  By signing my name below, ITanja ,  Scribe   attest that this documentation has been prepared under the direction and in the presence of Miguel Magaña MD.

## 2024-12-26 DIAGNOSIS — M19.90 OSTEOARTHRITIS, UNSPECIFIED OSTEOARTHRITIS TYPE, UNSPECIFIED SITE: ICD-10-CM

## 2024-12-26 DIAGNOSIS — M54.2 NECK PAIN, CHRONIC: ICD-10-CM

## 2024-12-26 DIAGNOSIS — G89.29 NECK PAIN, CHRONIC: ICD-10-CM

## 2024-12-26 RX ORDER — HYDROCODONE BITARTRATE AND IBUPROFEN 7.5; 2 MG/1; MG/1
1 TABLET, FILM COATED ORAL EVERY 4 HOURS PRN
Qty: 150 TABLET | Refills: 0 | Status: SHIPPED | OUTPATIENT
Start: 2024-12-26

## 2025-01-03 ENCOUNTER — APPOINTMENT (OUTPATIENT)
Dept: UROLOGY | Facility: CLINIC | Age: 65
End: 2025-01-03
Payer: MEDICARE

## 2025-01-03 VITALS
WEIGHT: 124.34 LBS | RESPIRATION RATE: 16 BRPM | DIASTOLIC BLOOD PRESSURE: 76 MMHG | BODY MASS INDEX: 18.91 KG/M2 | HEART RATE: 68 BPM | SYSTOLIC BLOOD PRESSURE: 141 MMHG

## 2025-01-03 DIAGNOSIS — R33.9 URINARY RETENTION: ICD-10-CM

## 2025-01-03 DIAGNOSIS — N35.12 POSTINFECTIVE URETHRAL STRICTURE IN FEMALE: Primary | ICD-10-CM

## 2025-01-03 DIAGNOSIS — D49.4 NEOPLASM OF BLADDER: ICD-10-CM

## 2025-01-03 PROCEDURE — 52281 CYSTOSCOPY AND TREATMENT: CPT | Performed by: UROLOGY

## 2025-01-03 ASSESSMENT — ENCOUNTER SYMPTOMS: DEPRESSION: 0

## 2025-01-03 NOTE — PATIENT INSTRUCTIONS
Patient Discussion/Summary     Good to see you again. You were successfully dilated to 30 Sami.  Your bladder does show improvement.  Your CAT scan does not show any urologic source for your flank pain on the right side.  We will see you again in 4 months.      This note was created with voice-recognition software and was not corrected for typographical or grammatical errors.

## 2025-01-03 NOTE — LETTER
January 3, 2025     Miguel Magaña MD  [address] [facility] [address]    Patient: Jennifer Gonzalez   YOB: 1960   Date of Visit: 1/3/2025       Dear Dr. Miguel Magaña MD:    Thank you for referring Jennifer Gonzalez to me for evaluation. Below are my notes for this consultation.  If you have questions, please do not hesitate to call me. I look forward to following your patient along with you.       Sincerely,     Saeid Larson MD      CC: No Recipients  ______________________________________________________________________________________      Provider Impressions     64 year-old white female originally seen on 08/17/04. Patient had a long history of URINARY TRACT INFECTIONS, DYSURIA, URETHRAL STRICTURES. No family history of breast or prostate cancer. The patient does smoke cigarettes on a social basis.     In 2002, patient underwent a total abdominal hysterectomy with bilateral salpingo-oophorectomy (GLORIA/ BSO) for UTERINE CANCER. She also has developed a CYSTOCELE grade 2 and a RECTOCELE grade 2. She was receiving CYSTOSCOPIES with URETHRAL DILATATIONS UNDER ANESTHESIA every 3-6 months.     Then, the patient DISAPPEARED.     She returned in 2014 with RHEUMATOID ARTHRITIS, THYROID DISEASE, and other medical problems.      On 06/05/14 we performed a CYSTOSCOPY with URETHRAL DILATATION under anesthesia. She strictured and created scar tissue within 3 weeks. We are attempting to maintain her in the office setting.She is also titrating her Flomax doses.     10/01/14 CYSTOSCOPY with URETHRAL DILATATION to 30 Omani. Patient had been noticing a decreased flow stream for the past 2 days prior.     11/04/14 CYSTOSCOPY with URETHRAL DILATATION to 30 Omani. No bleeding. Patient states that she was straining to urinate over the past 4 days     12/03/14, patient recently diagnosed with MEDITERRANEAN FEVER. She states now for the past 2 days she said significant DYSURIA and straining to urinate. She was  successfully DILATED to 30 Ethiopian with minimal bleeding. No family history of breast or prostate cancer. Presently an occasional cigarette smoker.     01/07/14, patient is now being treated with colchicine. She states that for the first time, she has not had straining towards the end of her stream. 12 Ethiopian STRICTURE successfully DILATED to 30 Ethiopian. However, the lower half of the bladder was erythematous. This is surprising in light of the fact that the patient had no symptoms. Fortunately, her postvoid residual is only 16 cc, a dramatic decrease from the 2-300 cc PVRs.     03/27/15, May 8 2015, 06/08/15, successful DILATION of to 30 Ethiopian, normal-appearing bladder, some difficulty,      03/27/15, May 8 2015, 06/08/15, 08/31/15 successful DILATION to 30 Ethiopian, normal-appearing bladder, some difficulty, flowrate 29 cc/s, PVR 0 cc.      COMPUTERS DOWN  COMPUTERS DOWN  COMPUTERS DOWN      10/05/15, patient states that she is noticed a decreased flow of stream over the past 5 days. This is accompanied with significant NOCTURIA. Successfully DILATED to 30 Ethiopian today. Good flow rate of 16 cc/s. She will continue on the five-week regimen.     01/04/16 successful DILATATION to 30 Ethiopian with pain today. She noticed that she is spraying when she urinates. We will increase to 6 weeks at this time. Flow of 7 with a PVR 0.     02/15/16, successful DILATATION to 30 Ethiopian. Patient has been having some FREQUENCY over the last week and NOCTURIA. There was minimal pain. Some ERYTHEMA along the base of the bladder. We will continue at a 6 week regimen. Flow rate of 32 with a PVR of 90     03/28/16, successful DILATATION to 30 Ethiopian. Less FREQUENCY. Only 3 days of SPRAYING during urination prior to today's DILATATION. Minimal pain. Trigone and base are ERYTHEMATOUS. Patient states that she has discomfort with the first void of the day. We will continue at a 6 week regimen. Flow rate of 14 with a PVR of 47. She will be  maintaining Flomax.     05/03/16, successful CYSTOSCOPY with URETHRAL DILATATION to 30 Jordanian. Once again, the patient states she was experiencing SUPRAPUBIC PAIN and spraying with urination over the past 3 days prior to this procedure. She wishes to stay at a 6 week regimen. Flow rate of 13 with a PVR 13     06/13/16, successful CYSTOSCOPY with URETHRAL DILATATION of to 30 Jordanian. Patient had minimal pain, still mild diffuse ERYTHEMA. Flow rate of 34 with a PVR of 0. Patient states that over the last 4 days she began spraying her urine. She wishes to maintain a 6 week regimen.     07/27/16, successful CYSTOSCOPY with URETHRAL DILATATION to 30 Jordanian with pain. The patient states that she has been STRAINING to urinate. Flow rate of 9 with a PVR of 0. She will return in 6 weeks and also have her yearly laboratory testing.     09/12/16, all urine studies are normal. Successful CYSTOSCOPY with URETHRAL DILATATION at 30 Jordanian. The patient states that she is had a spraying stream and disuria. She is 1 week late for her dilation due to scheduling. Flow rate of 11 with a PVR of 0. We will maintain a 6 week regimen.     10/18/16, CYSTOSCOPY with URETHRAL DILATATION to 30 Jordanian. Significantly more pain today than routine. However, virtually no erythema. Flow rate of 13 with a PVR of 0. We will maintain the 6 week regimen. She will continue on Flomax.     11/29/16, CYSTOSCOPY with URETHRAL DILATATION to 30 Jordanian. Somewhat painful. The patient states that over the last 48 hours she has been straining severely to urinate. She will continue on Flomax. PVR 56. She will return in 6 weeks.     01/11/17, patient states that over the last 4 days she has had bladder discomfort, straining to urinate, and bladder spasms during the nighttime hours. She does not wish to increase her time. Wants to say at 6 weeks interval. Successful DILATION to 30 Jordanian with minimal pain and bleeding. Flow rate of 24 with a PVR of 136.     02/21/17,  patient states she is been straining to urinate over the past 3 days. She is also complaining of intermittent vaginal pain. She had successful CYSTOSCOPY WITH URETHRAL DILATATION to 30 Citizen of the [place] with discomfort. She wants to maintain the 6 week schedule. Flow rate of 9 with a PVR of 0. I will refer her to Dr. Barkley for evaluation of her GYN issues. Today on exam, there was significant discomfort along the posterior vaginal wall.     04/04/17, patient saw Dr. Barkley in consultation and recommended PELVIC PHYSICAL THERAPY and Botox. Patient will consider that alternative treatment and let him know of her decision. She was DILATED FOR A URETHRAL STRICTURE today with a CYSTOSCOPY WITH URETHRAL DILATATION to 30 Citizen of the [place]. Moderate discomfort. The bladder was only mildly erythematous. Flow rate of 9 with a PVR of 12. She wishes to keep a 6 week schedule. She will continue on Flomax.     05/19/17, successful CYSTOSCOPY WITH URETHRAL DILATATION to 30 Citizen of the [place]. Flow rate of 20 with a PVR of 86. Patient states that over the last 3 days she has been straining to urinate. Continue Flomax. She will return in 6 weeks.     06/28/17, successful CYSTOSCOPY WITH URETHRAL DILATATION at 30 Citizen of the [place] with minimal pain. Flow rate of 24 with a PVR 66. Patient states that over the last 3 days she has been straining to urinate with discomfort. She will continue Flomax. She will return in 6 weeks and also obtain her yearly labs.     08/15/17, successful CYSTOSCOPY WITH URETHRAL DILATATION to 30 Citizen of the [place] moderate pain. Flow rate of 7 with a PVR of 80. Patient has been straining for the last week to urinate. She will continue on Flomax. Urinalysis shows 50 red blood cells, urine cultures negative, urine cytology is negative. We will continue Flomax and urethral dilatations every 6 weeks. When she returns for her next visit we will order a renal ultrasound with respect to her hematuria     09/26/17, successful CYSTOSCOPY WITH URETHRAL DILATATION to 30  Andorran with moderate pain. Flow rate of 3 with a PVR 14. Patient continues to strain just one week prior to dilatation. She continues on Flomax. Renal ultrasound was completely negative. We will see her again in 6 weeks.     11/08/17, successful CYSTOSCOPY WITH URETHRAL DILATATION to 30 Andorran with mild to moderate pain. Flow rate of 17 with a PVR of 0. She has been straining over the last 3 days to urinate. She continues on Flomax. She strongly wants to continue on a 6 week regimen. She notified us that her insurance is changing and I given her recommendations for new urologist that we will be on her planned for next year.     12/16/17, successful CYSTOSCOPY WITH URETHRAL DILATATION to 30 Andorran with mild to moderate pain. Flow rate of 6 with a PVR of 23. Again, she has been straining over the last 4-5 days with urination. She continues on Flomax. She is scheduled for major back surgery with Dr. Maldonado at the main campus [facility] immediately following [place]. Due to change in insurance, she will be continuing her urologic care with Dr. Schroeder. We will send all pertinent information to him.     05/14/18, in office urethral dilation to 30 Andorran. Patient returns complaining of significant urgency and frequency. Patient complains of incomplete emptying. Office visit with urethral dilatation performed with minimal pain. She wishes to return in 3 months.     08/22/18, in office urethral dilation to 30 Andorran. Patient returns complaining of straining on urination for the last week.. Patient complains of incomplete emptying. Office visit with urethral dilatation performed with minimal pain. She wishes to return in 3 months.     02/12/19, successful cystoscopy with urethral dilatation to 30 Andorran of a very dense stricture, with pain and bleeding. Trigone and posterior bladder quite erythematous. Patient has changed her insurance and is able to return in wishes to initiate a 2 month schedule at this time. She will  discontinue her Flomax to due to side effects.     04/15/19, successful cystoscopy with urethral dilatation to 30 Angolan of a very dense stricture, with pain. The bladder has patchy erythema.. Patient wishes to initiate a 2 month schedule at this time.     06/14/19, successful cystoscopy with urethral dilatation to 30 Angolan of a urethral stricture. Minimal pain. The erythema within the bladder has improved. Patient will continue on a 2 month schedule.     09/03/19, successful cystoscopy with urethral dilatation at 30 Angolan of a urethral stricture. Minimal pain. Mild erythema within the trigone and posterior bladder. Patient states she has been straining over the last 10 days and wishes to maintain her 2 month schedule for another year. Urine culture was positive for Klebsiella sensitive to Bactrim. Cytology is negative.     01/21/20, successful cystoscopy with urethral dilatation to 30 Angolan of a urethral stricture. Minimal pain. Posterior bladder has moderate erythema. Patient complains of suprapubic pain however she is late for her schedule. She will maintain a 2 month schedule.     03/16/20, successful cystoscopy with urethral dilatation to 30 Angolan of a urethral stricture. Minimal pain with no bleeding. Trigone and bladder base has minimal erythema. Patient now complains of bilateral lower quadrant pain. Vaginal exam did not reproduce the pain. I recommended that she speak to her primary care physician or gastro-and neurologist. She does not have any reproductive organs remaining. We will see her again in 2 months.     05/18/20, successful cystoscopy with urethral dilatation at 30 Angolan of a tight urethral stricture. There was minimal pain and no bleeding. Once again the trigone and bladder base has minimal erythema. She was complaining of suprapubic pain which could not be reproduced by vaginal exam however abdominal exam along her Pfannenstiel incision did reproduce the discomfort. She will return in 2  months.     09/25/20, successful cystoscopy with urethral dilatation to 30 Panamanian of a tight urethral stricture. Minimal pain with no bleeding. Erythema in the trigone and posterior bladder with a clear line of demarcation. No further suprapubic pain. She wishes to expand to a 3 month program.     11/23/20, successful cystoscopy with urethral dilatation to 30 Panamanian of a urethral stricture. Minimal pain and no bleeding. Once again, erythema in the trigone and bladder base with a clear line of demarcation. She states she has been straining the last 2 weeks to urinate. She wishes to maintain a 3 month schedule.     February 26, 2021, successful cystoscopy with urethral dilatation at 30 Panamanian of a dense urethral stricture. Moderate pain and no bleeding. Bladder based erythema has resolved however the trigone is still inflamed. Once again, she has been straining the last 2 to 3 weeks with urination, has frequency every 35 to 45 minutes, and wishes to maintain a 3-month schedule.     May 25, 2021, successful cystoscopy with urethral dilatation to 30 Panamanian of a dense urethral stricture. Once again, moderate pain and no bleeding. Erythema in the trigone and posterior bladder. She states that she feels that she has been straining with obstruction to urinate recently. She will return in 3 months.     August 20, 2021, successful cystoscopy with urethral dilatation of a dense stricture to 30 Panamanian. Moderate pain. Mild erythema posteriorly and moderate erythema at the trigone. Urinalysis shows small blood, urine culture no growth, urine cytology is lacking atypia. I was suggesting increasing her regimen to 4 months, however the patient states that over the last 2 weeks she has been double voiding and now for the first time in years, she is experiencing nocturia x2 with urinary straining. She wishes to maintain her 3-month regimen.     November 23, 2021, successful cystoscopy with urethral dilatation of a dense urethral  stricture to 30 Israeli. Moderate pain. The only erythema is posteriorly which is moderate. Patient has been straining to urinate for the past week and double voiding. She wishes to maintain her 3-month schedule.     February 23, 2022, successful cystoscopy with dilatation of a dense upward sloping urethral stricture to 30 Israeli. Moderate pain. Again, posterior erythema is identified. She wishes to maintain her 3-month schedule.     May 23, 2022, successful cystoscopy with dilatation of a dense upward sloping urethral stricture to 30 Israeli. Minimal pain. Erythema in the trigone and bladder base. Patient is complaining of recurrent right inguinal hernia tender to palpation. I will refer her to Dr. Baldwin. She will return to me in 3 months.     7/8/2022, office visit, Dr. Gricelda Baldwin, general surgery. Evaluation of recurrent inguinal hernias. CAT scan does not reveal any recurrence of hernias and the ilioinguinal nerves are intact.     August 5, 2022, successful cystoscopy with dilatation of an upward sloping urethral stricture to 30 Israeli. Minimal discomfort. Once again a patch of erythema just lateral to the left ureteral orifice where previous resected bladder tumor site was seen. No evidence of recurrent tumor or stones. Urine cytology is negative for malignant cells. Urine culture no growth. Urinalysis shows 6 red blood cells. Patient complained of double voiding, incomplete emptying and the need to press her suprapubic region to initiate her stream. This is over the last 4 days. She does not want to expand to a 4-month regimen.     November 21, 2022, successful cystoscopy with dilatation of an upward sloping urethral stricture to 30 Israeli with minimal pain. The well-healed previous bladder tumor site just lateral to the left ureteral orifice was seen once again. No new tumors or stones. She wishes to maintain a 3-month regimen.     May 5, 2023, successful cystoscopy with dilatation of an upward sloping  urethral stricture to 30 Hungarian with moderate discomfort. Erythema is concentrated posteriorly with a clear line of demarcation. Previous bladder tumors sites well-healed seen just lateral to the left ureteral orifice. She has been caring for her  who has been suffering from pulmonary issues leading to septic shock. She will return in 3 months.     2023, successful cystoscopy with dilatation of an upward sloping urethral stricture to 30 Hungarian with minimal discomfort and no bleeding. Once again erythema is identified posteriorly. Urinalysis shows 20 red blood cells. Urine culture no growth. Urine cytology is negative for malignant cells. We will order a renal and bladder ultrasound before her next appointment. We will also repeat her urinalysis and urine culture. She is no longer straining and we agreed to expand to 4 months regimen.     FLOMAX allergy     2023, successful cystoscopy with dilatation of an upward sloping urethral stricture to 30 Hungarian with minimal pain, no bleeding.  Erythema is again confined posteriorly.  Renal ultrasound shows a 3 mm nonobstructing right renal calculus.  Patient is complaining of right flank and right groin pain.  We will order a renal colic CAT scan now as well as her urine studies which she did not complete.  She wishes to maintain her 4-month schedule.     2024, renal colic CAT scan does not identify any stones or tumors     2024, successful cystoscopy with dilatation to 30 Hungarian of an upward sloping dense urethral stricture with minimal pain and no bleeding.  Erythema once again identified in the base.  No further right groin pain.  She will return in 4 months.     2024, patient's mother  at the age of 89     2024, successful cystoscopy with dilatation and 30 Hungarian of an upward sloping dense urethral stricture with minimal discomfort.  No bleeding.  Erythema posteriorly.  She is complaining again  of right flank and inguinal pain and we will order a renal colic CAT scan again.  Urinalysis does show 20 red blood cells.  Urine culture no growth.  Urine cytology was lacking atypia.  She wishes to maintain her 4-month schedule as she was straining to urinate over the last 15 days.    September 5, 2024, telephone call, renal colic CAT scan does not identify any urinary tract calculi or hydroureteronephrosis    January 3, 2025, successful cystoscopy with dilatation to 30 Macanese of an upward sloping dense urethral stricture.  Minimal discomfort and no bleeding.  Posterior erythema.  She does continue to to experience intermittent right flank pain although no urologic source identified on CAT scan.  She wishes to maintain her 4-month schedule.     PLAN:     #1 patient will return in 4 months for cystoscopy with urethral dilatation at 30 Macanese. She will receive MACRODANTIN 50 mg PROPHYLAXIS 4 doses.      #2 in August 2025 with urine studies. Consider expanding to 5 months at that time.     3.  Renal colic CAT scan now for right flank and inguinal pain     Physical Exam  Vitals and nursing note reviewed. Exam conducted with a chaperone present.   Constitutional:       Appearance: Normal appearance.   HENT:      Head: Normocephalic and atraumatic.   Pulmonary:      Effort: Pulmonary effort is normal.   Abdominal:      Palpations: Abdomen is soft.      Tenderness: There is right CVA tenderness.   Genitourinary:     General: Normal vulva.      Vagina: No vaginal discharge.   Musculoskeletal:         General: Normal range of motion.      Cervical back: Normal range of motion and neck supple.   Neurological:      General: No focal deficit present.      Mental Status: She is alert and oriented to person, place, and time.   Psychiatric:         Mood and Affect: Mood normal.         Behavior: Behavior normal.        This note was created with voice-recognition software and was not corrected for typographical or grammatical  errors.

## 2025-01-03 NOTE — PROGRESS NOTES
Provider Impressions     64 year-old white female originally seen on 08/17/04. Patient had a long history of URINARY TRACT INFECTIONS, DYSURIA, URETHRAL STRICTURES. No family history of breast or prostate cancer. The patient does smoke cigarettes on a social basis.     In 2002, patient underwent a total abdominal hysterectomy with bilateral salpingo-oophorectomy (GLORIA/ BSO) for UTERINE CANCER. She also has developed a CYSTOCELE grade 2 and a RECTOCELE grade 2. She was receiving CYSTOSCOPIES with URETHRAL DILATATIONS UNDER ANESTHESIA every 3-6 months.     Then, the patient DISAPPEARED.     She returned in 2014 with RHEUMATOID ARTHRITIS, THYROID DISEASE, and other medical problems.      On 06/05/14 we performed a CYSTOSCOPY with URETHRAL DILATATION under anesthesia. She strictured and created scar tissue within 3 weeks. We are attempting to maintain her in the office setting.She is also titrating her Flomax doses.     10/01/14 CYSTOSCOPY with URETHRAL DILATATION to 30 Macedonian. Patient had been noticing a decreased flow stream for the past 2 days prior.     11/04/14 CYSTOSCOPY with URETHRAL DILATATION to 30 Macedonian. No bleeding. Patient states that she was straining to urinate over the past 4 days     12/03/14, patient recently diagnosed with MEDITERRANEAN FEVER. She states now for the past 2 days she said significant DYSURIA and straining to urinate. She was successfully DILATED to 30 Macedonian with minimal bleeding. No family history of breast or prostate cancer. Presently an occasional cigarette smoker.     01/07/14, patient is now being treated with colchicine. She states that for the first time, she has not had straining towards the end of her stream. 12 Macedonian STRICTURE successfully DILATED to 30 Macedonian. However, the lower half of the bladder was erythematous. This is surprising in light of the fact that the patient had no symptoms. Fortunately, her postvoid residual is only 16 cc, a dramatic decrease from the 2-300  cc PVRs.     03/27/15, May 8 2015, 06/08/15, successful DILATION of to 30 Swedish, normal-appearing bladder, some difficulty,      03/27/15, May 8 2015, 06/08/15, 08/31/15 successful DILATION to 30 Swedish, normal-appearing bladder, some difficulty, flowrate 29 cc/s, PVR 0 cc.      COMPUTERS DOWN  COMPUTERS DOWN  COMPUTERS DOWN      10/05/15, patient states that she is noticed a decreased flow of stream over the past 5 days. This is accompanied with significant NOCTURIA. Successfully DILATED to 30 Swedish today. Good flow rate of 16 cc/s. She will continue on the five-week regimen.     01/04/16 successful DILATATION to 30 Swedish with pain today. She noticed that she is spraying when she urinates. We will increase to 6 weeks at this time. Flow of 7 with a PVR 0.     02/15/16, successful DILATATION to 30 Swedish. Patient has been having some FREQUENCY over the last week and NOCTURIA. There was minimal pain. Some ERYTHEMA along the base of the bladder. We will continue at a 6 week regimen. Flow rate of 32 with a PVR of 90     03/28/16, successful DILATATION to 30 Swedish. Less FREQUENCY. Only 3 days of SPRAYING during urination prior to today's DILATATION. Minimal pain. Trigone and base are ERYTHEMATOUS. Patient states that she has discomfort with the first void of the day. We will continue at a 6 week regimen. Flow rate of 14 with a PVR of 47. She will be maintaining Flomax.     05/03/16, successful CYSTOSCOPY with URETHRAL DILATATION to 30 Swedish. Once again, the patient states she was experiencing SUPRAPUBIC PAIN and spraying with urination over the past 3 days prior to this procedure. She wishes to stay at a 6 week regimen. Flow rate of 13 with a PVR 13     06/13/16, successful CYSTOSCOPY with URETHRAL DILATATION of to 30 Swedish. Patient had minimal pain, still mild diffuse ERYTHEMA. Flow rate of 34 with a PVR of 0. Patient states that over the last 4 days she began spraying her urine. She wishes to maintain a 6 week  regimen.     07/27/16, successful CYSTOSCOPY with URETHRAL DILATATION to 30 Romanian with pain. The patient states that she has been STRAINING to urinate. Flow rate of 9 with a PVR of 0. She will return in 6 weeks and also have her yearly laboratory testing.     09/12/16, all urine studies are normal. Successful CYSTOSCOPY with URETHRAL DILATATION at 30 Romanian. The patient states that she is had a spraying stream and disuria. She is 1 week late for her dilation due to scheduling. Flow rate of 11 with a PVR of 0. We will maintain a 6 week regimen.     10/18/16, CYSTOSCOPY with URETHRAL DILATATION to 30 Romanian. Significantly more pain today than routine. However, virtually no erythema. Flow rate of 13 with a PVR of 0. We will maintain the 6 week regimen. She will continue on Flomax.     11/29/16, CYSTOSCOPY with URETHRAL DILATATION to 30 Romanian. Somewhat painful. The patient states that over the last 48 hours she has been straining severely to urinate. She will continue on Flomax. PVR 56. She will return in 6 weeks.     01/11/17, patient states that over the last 4 days she has had bladder discomfort, straining to urinate, and bladder spasms during the nighttime hours. She does not wish to increase her time. Wants to say at 6 weeks interval. Successful DILATION to 30 Romanian with minimal pain and bleeding. Flow rate of 24 with a PVR of 136.     02/21/17, patient states she is been straining to urinate over the past 3 days. She is also complaining of intermittent vaginal pain. She had successful CYSTOSCOPY WITH URETHRAL DILATATION to 30 Romanian with discomfort. She wants to maintain the 6 week schedule. Flow rate of 9 with a PVR of 0. I will refer her to Dr. Barkley for evaluation of her GYN issues. Today on exam, there was significant discomfort along the posterior vaginal wall.     04/04/17, patient saw Dr. Barkley in consultation and recommended PELVIC PHYSICAL THERAPY and Botox. Patient will consider that alternative  treatment and let him know of her decision. She was DILATED FOR A URETHRAL STRICTURE today with a CYSTOSCOPY WITH URETHRAL DILATATION to 30 Libyan. Moderate discomfort. The bladder was only mildly erythematous. Flow rate of 9 with a PVR of 12. She wishes to keep a 6 week schedule. She will continue on Flomax.     05/19/17, successful CYSTOSCOPY WITH URETHRAL DILATATION to 30 Libyan. Flow rate of 20 with a PVR of 86. Patient states that over the last 3 days she has been straining to urinate. Continue Flomax. She will return in 6 weeks.     06/28/17, successful CYSTOSCOPY WITH URETHRAL DILATATION at 30 Libyan with minimal pain. Flow rate of 24 with a PVR 66. Patient states that over the last 3 days she has been straining to urinate with discomfort. She will continue Flomax. She will return in 6 weeks and also obtain her yearly labs.     08/15/17, successful CYSTOSCOPY WITH URETHRAL DILATATION to 30 Libyan moderate pain. Flow rate of 7 with a PVR of 80. Patient has been straining for the last week to urinate. She will continue on Flomax. Urinalysis shows 50 red blood cells, urine cultures negative, urine cytology is negative. We will continue Flomax and urethral dilatations every 6 weeks. When she returns for her next visit we will order a renal ultrasound with respect to her hematuria     09/26/17, successful CYSTOSCOPY WITH URETHRAL DILATATION to 30 Libyan with moderate pain. Flow rate of 3 with a PVR 14. Patient continues to strain just one week prior to dilatation. She continues on Flomax. Renal ultrasound was completely negative. We will see her again in 6 weeks.     11/08/17, successful CYSTOSCOPY WITH URETHRAL DILATATION to 30 Libyan with mild to moderate pain. Flow rate of 17 with a PVR of 0. She has been straining over the last 3 days to urinate. She continues on Flomax. She strongly wants to continue on a 6 week regimen. She notified us that her insurance is changing and I given her recommendations for new  urologist that we will be on her planned for next year.     12/16/17, successful CYSTOSCOPY WITH URETHRAL DILATATION to 30 Kenyan with mild to moderate pain. Flow rate of 6 with a PVR of 23. Again, she has been straining over the last 4-5 days with urination. She continues on Flomax. She is scheduled for major back surgery with Dr. Maldonado at the main campus [facility] immediately following [place]. Due to change in insurance, she will be continuing her urologic care with Dr. Schroeder. We will send all pertinent information to him.     05/14/18, in office urethral dilation to 30 Kenyan. Patient returns complaining of significant urgency and frequency. Patient complains of incomplete emptying. Office visit with urethral dilatation performed with minimal pain. She wishes to return in 3 months.     08/22/18, in office urethral dilation to 30 Kenyan. Patient returns complaining of straining on urination for the last week.. Patient complains of incomplete emptying. Office visit with urethral dilatation performed with minimal pain. She wishes to return in 3 months.     02/12/19, successful cystoscopy with urethral dilatation to 30 Kenyan of a very dense stricture, with pain and bleeding. Trigone and posterior bladder quite erythematous. Patient has changed her insurance and is able to return in wishes to initiate a 2 month schedule at this time. She will discontinue her Flomax to due to side effects.     04/15/19, successful cystoscopy with urethral dilatation to 30 Kenyan of a very dense stricture, with pain. The bladder has patchy erythema.. Patient wishes to initiate a 2 month schedule at this time.     06/14/19, successful cystoscopy with urethral dilatation to 30 Kenyan of a urethral stricture. Minimal pain. The erythema within the bladder has improved. Patient will continue on a 2 month schedule.     09/03/19, successful cystoscopy with urethral dilatation at 30 Kenyan of a urethral stricture. Minimal pain. Mild erythema  within the trigone and posterior bladder. Patient states she has been straining over the last 10 days and wishes to maintain her 2 month schedule for another year. Urine culture was positive for Klebsiella sensitive to Bactrim. Cytology is negative.     01/21/20, successful cystoscopy with urethral dilatation to 30 Libyan of a urethral stricture. Minimal pain. Posterior bladder has moderate erythema. Patient complains of suprapubic pain however she is late for her schedule. She will maintain a 2 month schedule.     03/16/20, successful cystoscopy with urethral dilatation to 30 Libyan of a urethral stricture. Minimal pain with no bleeding. Trigone and bladder base has minimal erythema. Patient now complains of bilateral lower quadrant pain. Vaginal exam did not reproduce the pain. I recommended that she speak to her primary care physician or gastro-and neurologist. She does not have any reproductive organs remaining. We will see her again in 2 months.     05/18/20, successful cystoscopy with urethral dilatation at 30 Libyan of a tight urethral stricture. There was minimal pain and no bleeding. Once again the trigone and bladder base has minimal erythema. She was complaining of suprapubic pain which could not be reproduced by vaginal exam however abdominal exam along her Pfannenstiel incision did reproduce the discomfort. She will return in 2 months.     09/25/20, successful cystoscopy with urethral dilatation to 30 Libyan of a tight urethral stricture. Minimal pain with no bleeding. Erythema in the trigone and posterior bladder with a clear line of demarcation. No further suprapubic pain. She wishes to expand to a 3 month program.     11/23/20, successful cystoscopy with urethral dilatation to 30 Libyan of a urethral stricture. Minimal pain and no bleeding. Once again, erythema in the trigone and bladder base with a clear line of demarcation. She states she has been straining the last 2 weeks to urinate. She wishes  to maintain a 3 month schedule.     February 26, 2021, successful cystoscopy with urethral dilatation at 30 Bangladeshi of a dense urethral stricture. Moderate pain and no bleeding. Bladder based erythema has resolved however the trigone is still inflamed. Once again, she has been straining the last 2 to 3 weeks with urination, has frequency every 35 to 45 minutes, and wishes to maintain a 3-month schedule.     May 25, 2021, successful cystoscopy with urethral dilatation to 30 Bangladeshi of a dense urethral stricture. Once again, moderate pain and no bleeding. Erythema in the trigone and posterior bladder. She states that she feels that she has been straining with obstruction to urinate recently. She will return in 3 months.     August 20, 2021, successful cystoscopy with urethral dilatation of a dense stricture to 30 Bangladeshi. Moderate pain. Mild erythema posteriorly and moderate erythema at the trigone. Urinalysis shows small blood, urine culture no growth, urine cytology is lacking atypia. I was suggesting increasing her regimen to 4 months, however the patient states that over the last 2 weeks she has been double voiding and now for the first time in years, she is experiencing nocturia x2 with urinary straining. She wishes to maintain her 3-month regimen.     November 23, 2021, successful cystoscopy with urethral dilatation of a dense urethral stricture to 30 Bangladeshi. Moderate pain. The only erythema is posteriorly which is moderate. Patient has been straining to urinate for the past week and double voiding. She wishes to maintain her 3-month schedule.     February 23, 2022, successful cystoscopy with dilatation of a dense upward sloping urethral stricture to 30 Bangladeshi. Moderate pain. Again, posterior erythema is identified. She wishes to maintain her 3-month schedule.     May 23, 2022, successful cystoscopy with dilatation of a dense upward sloping urethral stricture to 30 Bangladeshi. Minimal pain. Erythema in the trigone and  bladder base. Patient is complaining of recurrent right inguinal hernia tender to palpation. I will refer her to Dr. Baldwin. She will return to me in 3 months.     7/8/2022, office visit, Dr. Gricelda Baldwin, general surgery. Evaluation of recurrent inguinal hernias. CAT scan does not reveal any recurrence of hernias and the ilioinguinal nerves are intact.     August 5, 2022, successful cystoscopy with dilatation of an upward sloping urethral stricture to 30 Lebanese. Minimal discomfort. Once again a patch of erythema just lateral to the left ureteral orifice where previous resected bladder tumor site was seen. No evidence of recurrent tumor or stones. Urine cytology is negative for malignant cells. Urine culture no growth. Urinalysis shows 6 red blood cells. Patient complained of double voiding, incomplete emptying and the need to press her suprapubic region to initiate her stream. This is over the last 4 days. She does not want to expand to a 4-month regimen.     November 21, 2022, successful cystoscopy with dilatation of an upward sloping urethral stricture to 30 Lebanese with minimal pain. The well-healed previous bladder tumor site just lateral to the left ureteral orifice was seen once again. No new tumors or stones. She wishes to maintain a 3-month regimen.     May 5, 2023, successful cystoscopy with dilatation of an upward sloping urethral stricture to 30 Lebanese with moderate discomfort. Erythema is concentrated posteriorly with a clear line of demarcation. Previous bladder tumors sites well-healed seen just lateral to the left ureteral orifice. She has been caring for her  who has been suffering from pulmonary issues leading to septic shock. She will return in 3 months.     August 7, 2023, successful cystoscopy with dilatation of an upward sloping urethral stricture to 30 Lebanese with minimal discomfort and no bleeding. Once again erythema is identified posteriorly. Urinalysis shows 20 red blood cells. Urine  culture no growth. Urine cytology is negative for malignant cells. We will order a renal and bladder ultrasound before her next appointment. We will also repeat her urinalysis and urine culture. She is no longer straining and we agreed to expand to 4 months regimen.     FLOMAX allergy     2023, successful cystoscopy with dilatation of an upward sloping urethral stricture to 30 Peruvian with minimal pain, no bleeding.  Erythema is again confined posteriorly.  Renal ultrasound shows a 3 mm nonobstructing right renal calculus.  Patient is complaining of right flank and right groin pain.  We will order a renal colic CAT scan now as well as her urine studies which she did not complete.  She wishes to maintain her 4-month schedule.     2024, renal colic CAT scan does not identify any stones or tumors     2024, successful cystoscopy with dilatation to 30 Peruvian of an upward sloping dense urethral stricture with minimal pain and no bleeding.  Erythema once again identified in the base.  No further right groin pain.  She will return in 4 months.     2024, patient's mother  at the age of 89     2024, successful cystoscopy with dilatation and 30 Peruvian of an upward sloping dense urethral stricture with minimal discomfort.  No bleeding.  Erythema posteriorly.  She is complaining again of right flank and inguinal pain and we will order a renal colic CAT scan again.  Urinalysis does show 20 red blood cells.  Urine culture no growth.  Urine cytology was lacking atypia.  She wishes to maintain her 4-month schedule as she was straining to urinate over the last 15 days.    2024, telephone call, renal colic CAT scan does not identify any urinary tract calculi or hydroureteronephrosis    January 3, 2025, successful cystoscopy with dilatation to 30 Peruvian of an upward sloping dense urethral stricture.  Minimal discomfort and no bleeding.  Posterior erythema.  She  does continue to to experience intermittent right flank pain although no urologic source identified on CAT scan.  She wishes to maintain her 4-month schedule.     PLAN:     #1 patient will return in 4 months for cystoscopy with urethral dilatation at 30 Belarusian. She will receive MACRODANTIN 50 mg PROPHYLAXIS 4 doses.      #2 in August 2025 with urine studies. Consider expanding to 5 months at that time.     3.  Renal colic CAT scan now for right flank and inguinal pain     Physical Exam  Vitals and nursing note reviewed. Exam conducted with a chaperone present.   Constitutional:       Appearance: Normal appearance.   HENT:      Head: Normocephalic and atraumatic.   Pulmonary:      Effort: Pulmonary effort is normal.   Abdominal:      Palpations: Abdomen is soft.      Tenderness: There is right CVA tenderness.   Genitourinary:     General: Normal vulva.      Vagina: No vaginal discharge.   Musculoskeletal:         General: Normal range of motion.      Cervical back: Normal range of motion and neck supple.   Neurological:      General: No focal deficit present.      Mental Status: She is alert and oriented to person, place, and time.   Psychiatric:         Mood and Affect: Mood normal.         Behavior: Behavior normal.        This note was created with voice-recognition software and was not corrected for typographical or grammatical errors.

## 2025-01-03 NOTE — PROGRESS NOTES
"Patient ID: Jennifer Gonzalez is a 64 y.o. female.    Procedures  Pt took macrodantin 50mg po as prescribed  Anesthesia: Local 2% Lidocaine  Instruments: 6F flexible disposable Cystoscope, female sounds     Pt brought to procedure room and placed in dorsal lithotomy position. Pt draped and prepped in normal sterile fashion. 5ml lidocaine instilled into urethral meatus and 5ml instilled into Vagina. Pt tolerated well.     I was present as chaperone for the entirety of the procedure   Carolina Munoz  Cystoscopy performed by Dr. Saeid Larson        Bedside \"Time Out\" Verification   Today's Date: 01/03/2025 I attest that this time out verification took place prior to the procedure.   Procedure: Cysto/dil  RN/LPN/MA: RADHA   Provider: [facility].   Verified By: MYLES GARCIA and Provider, Dr. Saeid Larson.   Prior to the start of the procedure a time out was taken and the following were verified: the identity of the patient using two patient identifiers, the correct procedure, the correct site marked as indicated, the correct positioning for the patient and the correct equipment was obtained.   Cystoscopy - Jennifer Gonzalez   identified using two (2) forms of identification.   Procedure: diagnostic Cystourethroscopy   Procedure Note: Time Started: 9:30am Time Completed: 10:10 AM  Indications for procedure: Cysto- Urethral Stricture    Discussed with patient: Risks, benefits, and alternative were discussed in detail. Patient appears to understand and agrees to proceed. Patient has signed the procedure consent form.    CYSTOSCOPY:    Cystoscopy today reveals a dense upward sloping urethral stricture dilated to 30 Burkinan with minimal discomfort and no bleeding.  Some erythema posteriorly.  No evidence of stones or tumors.  "

## 2025-01-23 DIAGNOSIS — M19.90 OSTEOARTHRITIS, UNSPECIFIED OSTEOARTHRITIS TYPE, UNSPECIFIED SITE: ICD-10-CM

## 2025-01-23 DIAGNOSIS — G89.29 NECK PAIN, CHRONIC: ICD-10-CM

## 2025-01-23 DIAGNOSIS — M54.2 NECK PAIN, CHRONIC: ICD-10-CM

## 2025-01-23 RX ORDER — HYDROCODONE BITARTRATE AND IBUPROFEN 7.5; 2 MG/1; MG/1
1 TABLET, FILM COATED ORAL EVERY 4 HOURS PRN
Qty: 150 TABLET | Refills: 0 | Status: SHIPPED | OUTPATIENT
Start: 2025-01-23

## 2025-01-23 NOTE — TELEPHONE ENCOUNTER
Could you please refill my Vicoprofen 7.5/200 please   PLEASE SEND IT TO [facility]   Thank you

## 2025-02-06 DIAGNOSIS — G20.A1 PARKINSON DISEASE (MULTI): ICD-10-CM

## 2025-02-06 RX ORDER — ROPINIROLE 1 MG/1
1-2 TABLET, FILM COATED ORAL NIGHTLY
Qty: 180 TABLET | Refills: 0 | Status: SHIPPED | OUTPATIENT
Start: 2025-02-06

## 2025-02-24 ENCOUNTER — APPOINTMENT (OUTPATIENT)
Dept: PRIMARY CARE | Facility: CLINIC | Age: 65
End: 2025-02-24
Payer: MEDICARE

## 2025-02-24 VITALS
HEIGHT: 68 IN | DIASTOLIC BLOOD PRESSURE: 80 MMHG | SYSTOLIC BLOOD PRESSURE: 130 MMHG | WEIGHT: 127 LBS | RESPIRATION RATE: 18 BRPM | BODY MASS INDEX: 19.25 KG/M2

## 2025-02-24 DIAGNOSIS — I10 HTN (HYPERTENSION), BENIGN: ICD-10-CM

## 2025-02-24 DIAGNOSIS — G89.29 NECK PAIN, CHRONIC: ICD-10-CM

## 2025-02-24 DIAGNOSIS — Q07.00 ARNOLD-CHIARI MALFORMATION (MULTI): ICD-10-CM

## 2025-02-24 DIAGNOSIS — Z51.81 THERAPEUTIC DRUG MONITORING: ICD-10-CM

## 2025-02-24 DIAGNOSIS — E27.1 ADRENAL INSUFFICIENCY (ADDISON'S DISEASE) (MULTI): ICD-10-CM

## 2025-02-24 DIAGNOSIS — I47.29: ICD-10-CM

## 2025-02-24 DIAGNOSIS — G35 MULTIPLE SCLEROSIS (MULTI): ICD-10-CM

## 2025-02-24 DIAGNOSIS — E78.2 MIXED HYPERLIPIDEMIA: ICD-10-CM

## 2025-02-24 DIAGNOSIS — Z00.00 MEDICARE ANNUAL WELLNESS VISIT, SUBSEQUENT: Primary | ICD-10-CM

## 2025-02-24 DIAGNOSIS — F51.01 PRIMARY INSOMNIA: ICD-10-CM

## 2025-02-24 DIAGNOSIS — G20.A1 PARKINSON'S DISEASE, UNSPECIFIED WHETHER DYSKINESIA PRESENT, UNSPECIFIED WHETHER MANIFESTATIONS FLUCTUATE: ICD-10-CM

## 2025-02-24 DIAGNOSIS — D68.1 FACTOR XI DEFICIENCY (MULTI): ICD-10-CM

## 2025-02-24 DIAGNOSIS — F31.81 BIPOLAR 2 DISORDER (MULTI): ICD-10-CM

## 2025-02-24 DIAGNOSIS — K21.9 GERD WITHOUT ESOPHAGITIS: ICD-10-CM

## 2025-02-24 DIAGNOSIS — M19.90 OSTEOARTHRITIS, UNSPECIFIED OSTEOARTHRITIS TYPE, UNSPECIFIED SITE: ICD-10-CM

## 2025-02-24 DIAGNOSIS — M54.2 NECK PAIN, CHRONIC: ICD-10-CM

## 2025-02-24 PROCEDURE — 3079F DIAST BP 80-89 MM HG: CPT | Performed by: FAMILY MEDICINE

## 2025-02-24 PROCEDURE — G0439 PPPS, SUBSEQ VISIT: HCPCS | Performed by: FAMILY MEDICINE

## 2025-02-24 PROCEDURE — 3075F SYST BP GE 130 - 139MM HG: CPT | Performed by: FAMILY MEDICINE

## 2025-02-24 PROCEDURE — 3008F BODY MASS INDEX DOCD: CPT | Performed by: FAMILY MEDICINE

## 2025-02-24 PROCEDURE — 99213 OFFICE O/P EST LOW 20 MIN: CPT | Performed by: FAMILY MEDICINE

## 2025-02-24 RX ORDER — SUCRALFATE 1 G/1
1 TABLET ORAL
Qty: 60 TABLET | Refills: 5 | Status: SHIPPED | OUTPATIENT
Start: 2025-02-24 | End: 2025-08-23

## 2025-02-24 RX ORDER — ZOLPIDEM TARTRATE 10 MG/1
10 TABLET ORAL NIGHTLY
Qty: 30 TABLET | Refills: 5 | Status: SHIPPED | OUTPATIENT
Start: 2025-02-24

## 2025-02-24 RX ORDER — HYDROCODONE BITARTRATE AND IBUPROFEN 7.5; 2 MG/1; MG/1
1 TABLET, FILM COATED ORAL EVERY 4 HOURS PRN
Qty: 150 TABLET | Refills: 0 | Status: SHIPPED | OUTPATIENT
Start: 2025-02-24

## 2025-02-24 ASSESSMENT — ACTIVITIES OF DAILY LIVING (ADL)
GROCERY_SHOPPING: INDEPENDENT
MANAGING_FINANCES: INDEPENDENT
DRESSING: INDEPENDENT
TAKING_MEDICATION: INDEPENDENT
BATHING: INDEPENDENT
DOING_HOUSEWORK: INDEPENDENT

## 2025-02-24 ASSESSMENT — PATIENT HEALTH QUESTIONNAIRE - PHQ9
2. FEELING DOWN, DEPRESSED OR HOPELESS: NOT AT ALL
1. LITTLE INTEREST OR PLEASURE IN DOING THINGS: NOT AT ALL
SUM OF ALL RESPONSES TO PHQ9 QUESTIONS 1 AND 2: 0

## 2025-02-24 NOTE — PROGRESS NOTES
"Subjective   Patient ID: Jennifer Gonzalez is a 64 y.o. female who presents for Insomnia, Pain, and Medicare Annual Wellness Visit Subsequent.  HPI    Medicare annual wellness visit     Patient presents for chronic pain. Is currently taking VICOPROFEN. Rates the pain a 8/10 over the past 7 days. Reports that the medication gives 80% pain control/relief. OARRS reviewed today, CSA 2/24/25. Last took this morning.    Patient in office being seen for a follow up on Insomnia. Currently taking ambien. Last took last night. Medication agreement signed on 2/24/25. Reports that the medication is working 10/10. 100% managed with the medication. States there are no adverse effects.        Taking current medications which were reviewed.  Problem list discussed.    Overall doing well.  Eating okay.  Staying active.    Today she is accompanied by her .     Has no other new problem /question.     ROS  Constitutional- No activity change. No appetite change.  Eyes- Denies vision changes.  Respiratory- No shortness of breath.  Cardiovascular- No palpitations. No chest pain.  GI- No nausea or vomiting. No diarrhea or constipation. Denies abdominal pain.  Musculoskeletal- Denies joint swelling.  Extremities- No edema.  Neurological- Denies dizziness.  Skin- No rashes.  Psychiatric/Behavioral- Denies significant anxiety, or depressed mood.     Objective     /80   Resp 18   Ht 1.727 m (5' 8\")   Wt 57.6 kg (127 lb)   BMI 19.31 kg/m²     Allergies   Allergen Reactions    Tamsulosin Dizziness    Levofloxacin Hives, Other and Swelling     JOINT SWELLING    Morphine Itching    Pentazocine Other    Cephalexin Hives and Rash    Diphenhydramine Anxiety and Unknown    Penicillins Hives and Rash       Constitutional-- Well-nourished.  No distress  Head- unremarkable.  Eyes- PERRL.  Conjunctiva normal.  Nose- Normal.  No rhinorrhea noted.  Throat- Oropharynx is clear and moist.  Neck- Supple with no thyromegaly.  No significant " cervical adenopathy noted.  Pulmonary/Chest- Breath sounds normal with normal effort.  No wheezing.  Heart- Regular rate and rhythm.  No murmur.  Abdomen- Soft and non-tender.  No masses noted.  Musculoskeletal- Normal ROM.  No significant joint swelling  Extremities- No edema.   Neurological- Alert.  No noted deficits.  Skin- Warm.  No rashes.  Psychiatric/Behavioral- Mood and affect normal.  Behavior normal.     Assessment/Plan   1. Medicare annual wellness visit, subsequent        2. Osteoarthritis, unspecified osteoarthritis type, unspecified site  HYDROcodone-ibuprofen (Vicoprofen) 7.5-200 mg tablet      3. HTN (hypertension), benign        4. Mixed hyperlipidemia        5. GERD without esophagitis  sucralfate (Carafate) 1 gram tablet      6. Neck pain, chronic  HYDROcodone-ibuprofen (Vicoprofen) 7.5-200 mg tablet      7. Primary insomnia  zolpidem (Ambien) 10 mg tablet      8. Parkinson's disease, unspecified whether dyskinesia present, unspecified whether manifestations fluctuate        9. Multiple sclerosis (Multi)        10. Adrenal insufficiency ([place]'s disease) (Multi)        11. 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 Bipolar 2 disorder (Multi)        14. Therapeutic drug monitoring  Opiate/Opioid/Benzo Prescription Compliance      15. Short-coupled ventricular tachycardia (Multi)               Long talk. Treatment options reviewed.    Medicare wellness questionnaire reviewed in detail. Advanced Directives reviewed today, Importance of having Advance care planing discussed. Patient advised to provide the office with a copy if has not already done so. No problems with activities of daily living. Falls risks reviewed.     Reviewed most recent lab work with patient. Advised patient to remain up to date on routine maintenance and health screening.  Maintain appointments with specialists as scheduled.  Advised patient to remain up to date on immunizations.      Osteoarthritis controlled. Educated the patient on osteoarthritis care and management. Educated on muscle strength and exercise. Patient understands to take the least amount of pain medication in order to control symptoms.     Hypertension controlled  Parkinson stable  Insomnia controlled  Chiari malformation stable  Bipolar stable    Discussed importance of natural sources of nutrition.  Advised patient to consume vegetables, salads, fruits, nuts, and proteins such as fish and chicken.  Discussed portion control.      Discussed the importance of routine stretching and exercise.     Continue and take your medications as prescribed.    Health Maintenance issues discussed.    Importance of healthy diet and regular exercise regimen discussed.    We will contact you with any test results ordered. If you do not hear from us, please contact.    Follow-up as instructed or sooner if any problems or symptoms do not resolve as expected.      Scribe Attestation  By signing my name below, Tanja CAMACHO Scribe   attest that this documentation has been prepared under the direction and in the presence of Miguel Magaña MD.

## 2025-02-27 LAB
1OH-MIDAZOLAM UR-MCNC: NEGATIVE NG/ML
7AMINOCLONAZEPAM UR-MCNC: NEGATIVE NG/ML
A-OH ALPRAZ UR-MCNC: NEGATIVE NG/ML
A-OH-TRIAZOLAM UR-MCNC: NEGATIVE NG/ML
AMPHETAMINES UR QL: NEGATIVE NG/ML
BARBITURATES UR QL: NEGATIVE NG/ML
BZE UR QL: NEGATIVE NG/ML
CODEINE UR-MCNC: NEGATIVE NG/ML
CREAT UR-MCNC: 71.7 MG/DL
DRUG SCREEN COMMENT UR-IMP: ABNORMAL
EDDP UR-MCNC: NEGATIVE NG/ML
FENTANYL UR-MCNC: NEGATIVE NG/ML
HYDROCODONE UR-MCNC: 928 NG/ML
HYDROMORPHONE UR-MCNC: 887 NG/ML
LORAZEPAM UR-MCNC: NEGATIVE NG/ML
METHADONE UR-MCNC: NEGATIVE NG/ML
MORPHINE UR-MCNC: NEGATIVE NG/ML
NORDIAZEPAM UR-MCNC: NEGATIVE NG/ML
NORFENTANYL UR-MCNC: NEGATIVE NG/ML
NORHYDROCODONE UR CFM-MCNC: 2340 NG/ML
NOROXYCODONE UR CFM-MCNC: NEGATIVE NG/ML
NORTRAMADOL UR-MCNC: NEGATIVE NG/ML
OH-ETHYLFLURAZ UR-MCNC: NEGATIVE NG/ML
OXAZEPAM UR-MCNC: NEGATIVE NG/ML
OXIDANTS UR QL: NEGATIVE MCG/ML
OXYCODONE UR CFM-MCNC: NEGATIVE NG/ML
OXYMORPHONE UR CFM-MCNC: NEGATIVE NG/ML
PCP UR QL: NEGATIVE NG/ML
PH UR: 5.5 [PH] (ref 4.5–9)
QUEST 6 ACETYLMORPHINE: NEGATIVE NG/ML
QUEST NOTES AND COMMENTS: ABNORMAL
QUEST ZOLPIDEM: 12 NG/ML
TEMAZEPAM UR-MCNC: NEGATIVE NG/ML
THC UR QL: NEGATIVE NG/ML
TRAMADOL UR-MCNC: NEGATIVE NG/ML
ZOLPIDEM PHENYL-4-CARB UR CFM-MCNC: 1230 NG/ML

## 2025-03-03 ENCOUNTER — TELEPHONE (OUTPATIENT)
Dept: PRIMARY CARE | Facility: CLINIC | Age: 65
End: 2025-03-03
Payer: MEDICARE

## 2025-03-03 DIAGNOSIS — I10 HTN (HYPERTENSION), BENIGN: ICD-10-CM

## 2025-03-03 DIAGNOSIS — I25.10 CORONARY ARTERY DISEASE INVOLVING NATIVE CORONARY ARTERY OF NATIVE HEART, UNSPECIFIED WHETHER ANGINA PRESENT: Primary | ICD-10-CM

## 2025-03-03 DIAGNOSIS — E78.00 HYPERCHOLESTEROLEMIA: ICD-10-CM

## 2025-03-03 NOTE — TELEPHONE ENCOUNTER
PLEASE ADVISE WHICH LABS YOU WOULD LIKE COMPLETED.       Jennifer DIAZ Do [id number] [facility] Clinical Support Staff (supporting Miguel Magaña MD)12 hours ago (7:03 PM)       I think it’s been awhile since I’ve had a CBC with diff. Can your order it and I’ll get it done when I take my  Evangelista on 3/11 when he sees the surgeon for a follow up. ? I’m not sure if you want anything else. ?

## 2025-03-10 DIAGNOSIS — G20.A1 PARKINSON DISEASE (MULTI): ICD-10-CM

## 2025-03-10 RX ORDER — ROPINIROLE 1 MG/1
TABLET, FILM COATED ORAL
Qty: 180 TABLET | Refills: 1 | Status: SHIPPED | OUTPATIENT
Start: 2025-03-10 | End: 2025-03-13 | Stop reason: SDUPTHER

## 2025-03-12 LAB
BASOPHILS # BLD AUTO: 42 CELLS/UL (ref 0–200)
BASOPHILS NFR BLD AUTO: 0.8 %
CHOLEST SERPL-MCNC: 201 MG/DL
CHOLEST/HDLC SERPL: 2.6 (CALC)
EOSINOPHIL # BLD AUTO: 90 CELLS/UL (ref 15–500)
EOSINOPHIL NFR BLD AUTO: 1.7 %
ERYTHROCYTE [DISTWIDTH] IN BLOOD BY AUTOMATED COUNT: 12.8 % (ref 11–15)
HCT VFR BLD AUTO: 42.8 % (ref 35–45)
HDLC SERPL-MCNC: 78 MG/DL
HGB BLD-MCNC: 14 G/DL (ref 11.7–15.5)
LDLC SERPL CALC-MCNC: 109 MG/DL (CALC)
LYMPHOCYTES # BLD AUTO: 1935 CELLS/UL (ref 850–3900)
LYMPHOCYTES NFR BLD AUTO: 36.5 %
MCH RBC QN AUTO: 29.2 PG (ref 27–33)
MCHC RBC AUTO-ENTMCNC: 32.7 G/DL (ref 32–36)
MCV RBC AUTO: 89.2 FL (ref 80–100)
MONOCYTES # BLD AUTO: 307 CELLS/UL (ref 200–950)
MONOCYTES NFR BLD AUTO: 5.8 %
NEUTROPHILS # BLD AUTO: 2926 CELLS/UL (ref 1500–7800)
NEUTROPHILS NFR BLD AUTO: 55.2 %
NONHDLC SERPL-MCNC: 123 MG/DL (CALC)
PLATELET # BLD AUTO: 242 THOUSAND/UL (ref 140–400)
PMV BLD REES-ECKER: 10.7 FL (ref 7.5–12.5)
RBC # BLD AUTO: 4.8 MILLION/UL (ref 3.8–5.1)
TRIGL SERPL-MCNC: 62 MG/DL
WBC # BLD AUTO: 5.3 THOUSAND/UL (ref 3.8–10.8)

## 2025-03-13 ENCOUNTER — TELEPHONE (OUTPATIENT)
Dept: PRIMARY CARE | Facility: CLINIC | Age: 65
End: 2025-03-13
Payer: MEDICARE

## 2025-03-13 RX ORDER — ROPINIROLE 1 MG/1
1-2 TABLET, FILM COATED ORAL NIGHTLY
Qty: 180 TABLET | Refills: 1 | Status: SHIPPED | OUTPATIENT
Start: 2025-03-13

## 2025-03-13 NOTE — TELEPHONE ENCOUNTER
----- Message from Miguel Magaña sent at 3/13/2025  7:36 AM EDT -----  Labs are within normal limits or stable except for minimally elevated cholesterol.  You have a lot of good cholesterol which is a good thing.  No medication changes recommended.  Continue healthy diet and follow-up per routine.  Please let her know

## 2025-03-13 NOTE — TELEPHONE ENCOUNTER
RECEIVED A FAX FROM [facility] IN REGARDS TO THIS PRESCRIPTION ROPINIROLE 1 MG - DIRECTIONS ON THIS PRESCRIPTION ARE: TO BE FILLED BY PROVIDER.  NEED THIS CORRECTED AND NEW PRESCRIPTION SENT TO THEM.

## 2025-03-24 DIAGNOSIS — G89.29 NECK PAIN, CHRONIC: ICD-10-CM

## 2025-03-24 DIAGNOSIS — M19.90 OSTEOARTHRITIS, UNSPECIFIED OSTEOARTHRITIS TYPE, UNSPECIFIED SITE: ICD-10-CM

## 2025-03-24 DIAGNOSIS — M54.2 NECK PAIN, CHRONIC: ICD-10-CM

## 2025-03-24 RX ORDER — HYDROCODONE BITARTRATE AND IBUPROFEN 7.5; 2 MG/1; MG/1
1 TABLET, FILM COATED ORAL EVERY 4 HOURS PRN
Qty: 150 TABLET | Refills: 0 | Status: SHIPPED | OUTPATIENT
Start: 2025-03-24

## 2025-03-24 NOTE — TELEPHONE ENCOUNTER
Please advise. Medication pended.   Last office visit 2/24/25  Next office visit 5/23/25    CSA and UDS 2/24/25    Jennifer Gonzalez [id number] [place] [id number] Clinical Support Staff  Phone Number: [phone]     Could you please refill my Vicoprofen 7.5/200  Send it to [facility]. Thank you

## 2025-04-23 DIAGNOSIS — M54.2 NECK PAIN, CHRONIC: ICD-10-CM

## 2025-04-23 DIAGNOSIS — G89.29 NECK PAIN, CHRONIC: ICD-10-CM

## 2025-04-23 DIAGNOSIS — M19.90 OSTEOARTHRITIS, UNSPECIFIED OSTEOARTHRITIS TYPE, UNSPECIFIED SITE: ICD-10-CM

## 2025-04-23 RX ORDER — HYDROCODONE BITARTRATE AND IBUPROFEN 7.5; 2 MG/1; MG/1
1 TABLET, FILM COATED ORAL EVERY 4 HOURS PRN
Qty: 150 TABLET | Refills: 0 | Status: SHIPPED | OUTPATIENT
Start: 2025-04-23

## 2025-04-23 NOTE — TELEPHONE ENCOUNTER
Last seen 2/24/25  Medication pended  UDS CONTRACT 2/24/25    Jennifer Gonzalez to EMILY Louise [id number] [facility] Clinical Support Staff (supporting Miguel Magaña MD) (Selected Message)        4/23/25  6:33 AM  Could you please refill my Vicoprofen 7.5/200 and send it to [facility].   Thank you

## 2025-05-06 ENCOUNTER — TELEPHONE (OUTPATIENT)
Dept: PRIMARY CARE | Facility: CLINIC | Age: 65
End: 2025-05-06
Payer: MEDICARE

## 2025-05-06 DIAGNOSIS — G35 MULTIPLE SCLEROSIS (MULTI): ICD-10-CM

## 2025-05-06 DIAGNOSIS — I10 HTN (HYPERTENSION), BENIGN: Primary | ICD-10-CM

## 2025-05-06 LAB
ALBUMIN SERPL-MCNC: 4 G/DL (ref 3.6–5.1)
ALP SERPL-CCNC: 87 U/L (ref 37–153)
ALT SERPL-CCNC: 17 U/L (ref 6–29)
ANION GAP SERPL CALCULATED.4IONS-SCNC: 8 MMOL/L (CALC) (ref 7–17)
AST SERPL-CCNC: 24 U/L (ref 10–35)
BASOPHILS # BLD AUTO: 38 CELLS/UL (ref 0–200)
BASOPHILS NFR BLD AUTO: 0.6 %
BILIRUB SERPL-MCNC: 0.4 MG/DL (ref 0.2–1.2)
BUN SERPL-MCNC: 21 MG/DL (ref 7–25)
CALCIUM SERPL-MCNC: 8.8 MG/DL (ref 8.6–10.4)
CHLORIDE SERPL-SCNC: 106 MMOL/L (ref 98–110)
CO2 SERPL-SCNC: 26 MMOL/L (ref 20–32)
CREAT SERPL-MCNC: 0.76 MG/DL (ref 0.5–1.05)
EGFRCR SERPLBLD CKD-EPI 2021: 88 ML/MIN/1.73M2
EOSINOPHIL # BLD AUTO: 102 CELLS/UL (ref 15–500)
EOSINOPHIL NFR BLD AUTO: 1.6 %
ERYTHROCYTE [DISTWIDTH] IN BLOOD BY AUTOMATED COUNT: 12.7 % (ref 11–15)
GLUCOSE SERPL-MCNC: 97 MG/DL (ref 65–99)
HCT VFR BLD AUTO: 43.5 % (ref 35–45)
HGB BLD-MCNC: 13.8 G/DL (ref 11.7–15.5)
LYMPHOCYTES # BLD AUTO: 1901 CELLS/UL (ref 850–3900)
LYMPHOCYTES NFR BLD AUTO: 29.7 %
MCH RBC QN AUTO: 28.7 PG (ref 27–33)
MCHC RBC AUTO-ENTMCNC: 31.7 G/DL (ref 32–36)
MCV RBC AUTO: 90.4 FL (ref 80–100)
MONOCYTES # BLD AUTO: 333 CELLS/UL (ref 200–950)
MONOCYTES NFR BLD AUTO: 5.2 %
NEUTROPHILS # BLD AUTO: 4026 CELLS/UL (ref 1500–7800)
NEUTROPHILS NFR BLD AUTO: 62.9 %
PLATELET # BLD AUTO: 238 THOUSAND/UL (ref 140–400)
PMV BLD REES-ECKER: 10.9 FL (ref 7.5–12.5)
POTASSIUM SERPL-SCNC: 4.4 MMOL/L (ref 3.5–5.3)
PROT SERPL-MCNC: 6.4 G/DL (ref 6.1–8.1)
RBC # BLD AUTO: 4.81 MILLION/UL (ref 3.8–5.1)
SODIUM SERPL-SCNC: 140 MMOL/L (ref 135–146)
WBC # BLD AUTO: 6.4 THOUSAND/UL (ref 3.8–10.8)

## 2025-05-06 RX ORDER — NITROFURANTOIN MACROCRYSTALS 50 MG/1
50 CAPSULE ORAL EVERY 12 HOURS
Qty: 4 CAPSULE | Refills: 0 | Status: SHIPPED | OUTPATIENT
Start: 2025-05-06 | End: 2025-05-08

## 2025-05-06 NOTE — TELEPHONE ENCOUNTER
Miguel Magaña MD to [id number] Clerical (Selected Message)        5/6/25 10:23 AM  Please let her know I ordered some lab work that she can get done a day or 2 before she sees me.  See if she can come see me this Friday to go over things and see how she is doing.  Thanks    5/6/25 10:23 AM

## 2025-05-06 NOTE — TELEPHONE ENCOUNTER
Please advise patient message       Bloodwork?  (Newest Message First)             Jenniferyuliya DIAZ Do [id number] [place] [id number] Clinical Support Staff (supporting Miguel Magaña MD) (Selected Message)        5/5/25  3:23 PM  For the past week my eyes have been puffy and my legs are swollen from the knees down (pitting edema) I did have Covid 3 weeks ago  but am fine now. My legs and ankles stay swollen even after sitting with my feet elevated. Should I get bloodwork ? (I checked my BP and it’s 130/80

## 2025-05-09 ENCOUNTER — APPOINTMENT (OUTPATIENT)
Dept: PRIMARY CARE | Facility: CLINIC | Age: 65
End: 2025-05-09
Payer: MEDICARE

## 2025-05-13 ENCOUNTER — APPOINTMENT (OUTPATIENT)
Dept: UROLOGY | Facility: CLINIC | Age: 65
End: 2025-05-13
Payer: MEDICARE

## 2025-05-13 VITALS
BODY MASS INDEX: 19.68 KG/M2 | DIASTOLIC BLOOD PRESSURE: 75 MMHG | WEIGHT: 129.41 LBS | RESPIRATION RATE: 16 BRPM | HEART RATE: 80 BPM | SYSTOLIC BLOOD PRESSURE: 133 MMHG

## 2025-05-13 DIAGNOSIS — R33.9 URINARY RETENTION: ICD-10-CM

## 2025-05-13 DIAGNOSIS — D49.4 NEOPLASM OF BLADDER: ICD-10-CM

## 2025-05-13 DIAGNOSIS — N39.0 URINARY TRACT INFECTION WITHOUT HEMATURIA, SITE UNSPECIFIED: ICD-10-CM

## 2025-05-13 DIAGNOSIS — R32 URINARY INCONTINENCE, UNSPECIFIED TYPE: ICD-10-CM

## 2025-05-13 DIAGNOSIS — N35.12 POSTINFECTIVE URETHRAL STRICTURE IN FEMALE: Primary | ICD-10-CM

## 2025-05-13 PROCEDURE — 52281 CYSTOSCOPY AND TREATMENT: CPT | Performed by: UROLOGY

## 2025-05-13 NOTE — LETTER
May 13, 2025     Miguel Magaña MD  [address] [facility] [address]    Patient: Jennifer Gonzalez   YOB: 1960   Date of Visit: 5/13/2025       Dear Dr. Miguel Magaña MD:    Thank you for referring Jennifer Gonzalez to me for evaluation. Below are my notes for this consultation.  If you have questions, please do not hesitate to call me. I look forward to following your patient along with you.       Sincerely,     Saeid Larson MD      CC: No Recipients  ______________________________________________________________________________________      Provider Impressions     64 year-old white female originally seen on 08/17/04. Patient had a long history of URINARY TRACT INFECTIONS, DYSURIA, URETHRAL STRICTURES. No family history of breast or prostate cancer. The patient does smoke cigarettes on a social basis.     In 2002, patient underwent a total abdominal hysterectomy with bilateral salpingo-oophorectomy (GLORIA/ BSO) for UTERINE CANCER. She also has developed a CYSTOCELE grade 2 and a RECTOCELE grade 2. She was receiving CYSTOSCOPIES with URETHRAL DILATATIONS UNDER ANESTHESIA every 3-6 months.     Then, the patient DISAPPEARED.     She returned in 2014 with RHEUMATOID ARTHRITIS, THYROID DISEASE, and other medical problems.      On 06/05/14 we performed a CYSTOSCOPY with URETHRAL DILATATION under anesthesia. She strictured and created scar tissue within 3 weeks. We are attempting to maintain her in the office setting.She is also titrating her Flomax doses.     10/01/14 CYSTOSCOPY with URETHRAL DILATATION to 30 Tamaz[place]. Patient had been noticing a decreased flow stream for the past 2 days prior.     11/04/14 CYSTOSCOPY with URETHRAL DILATATION to 30 [place]. No bleeding. Patient states that she was straining to urinate over the past 4 days     12/03/14, patient recently diagnosed with MEDITERRANEAN FEVER. She states now for the past 2 days she said significant DYSURIA and straining to urinate. She was  successfully DILATED to 30 Israeli with minimal bleeding. No family history of breast or prostate cancer. Presently an occasional cigarette smoker.     01/07/14, patient is now being treated with colchicine. She states that for the first time, she has not had straining towards the end of her stream. 12 Israeli STRICTURE successfully DILATED to 30 Israeli. However, the lower half of the bladder was erythematous. This is surprising in light of the fact that the patient had no symptoms. Fortunately, her postvoid residual is only 16 cc, a dramatic decrease from the 2-300 cc PVRs.     03/27/15, May 8 2015, 06/08/15, successful DILATION of to 30 Israeli, normal-appearing bladder, some difficulty,      03/27/15, May 8 2015, 06/08/15, 08/31/15 successful DILATION to 30 Israeli, normal-appearing bladder, some difficulty, flowrate 29 cc/s, PVR 0 cc.      COMPUTERS DOWN  COMPUTERS DOWN  COMPUTERS DOWN      10/05/15, patient states that she is noticed a decreased flow of stream over the past 5 days. This is accompanied with significant NOCTURIA. Successfully DILATED to 30 Israeli today. Good flow rate of 16 cc/s. She will continue on the five-week regimen.     01/04/16 successful DILATATION to 30 Israeli with pain today. She noticed that she is spraying when she urinates. We will increase to 6 weeks at this time. Flow of 7 with a PVR 0.     02/15/16, successful DILATATION to 30 Israeli. Patient has been having some FREQUENCY over the last week and NOCTURIA. There was minimal pain. Some ERYTHEMA along the base of the bladder. We will continue at a 6 week regimen. Flow rate of 32 with a PVR of 90     03/28/16, successful DILATATION to 30 Israeli. Less FREQUENCY. Only 3 days of SPRAYING during urination prior to today's DILATATION. Minimal pain. Trigone and base are ERYTHEMATOUS. Patient states that she has discomfort with the first void of the day. We will continue at a 6 week regimen. Flow rate of 14 with a PVR of 47. She will be  maintaining Flomax.     05/03/16, successful CYSTOSCOPY with URETHRAL DILATATION to 30 Dutch. Once again, the patient states she was experiencing SUPRAPUBIC PAIN and spraying with urination over the past 3 days prior to this procedure. She wishes to stay at a 6 week regimen. Flow rate of 13 with a PVR 13     06/13/16, successful CYSTOSCOPY with URETHRAL DILATATION of to 30 Dutch. Patient had minimal pain, still mild diffuse ERYTHEMA. Flow rate of 34 with a PVR of 0. Patient states that over the last 4 days she began spraying her urine. She wishes to maintain a 6 week regimen.     07/27/16, successful CYSTOSCOPY with URETHRAL DILATATION to 30 Dutch with pain. The patient states that she has been STRAINING to urinate. Flow rate of 9 with a PVR of 0. She will return in 6 weeks and also have her yearly laboratory testing.     09/12/16, all urine studies are normal. Successful CYSTOSCOPY with URETHRAL DILATATION at 30 Dutch. The patient states that she is had a spraying stream and disuria. She is 1 week late for her dilation due to scheduling. Flow rate of 11 with a PVR of 0. We will maintain a 6 week regimen.     10/18/16, CYSTOSCOPY with URETHRAL DILATATION to 30 Dutch. Significantly more pain today than routine. However, virtually no erythema. Flow rate of 13 with a PVR of 0. We will maintain the 6 week regimen. She will continue on Flomax.     11/29/16, CYSTOSCOPY with URETHRAL DILATATION to 30 Dutch. Somewhat painful. The patient states that over the last 48 hours she has been straining severely to urinate. She will continue on Flomax. PVR 56. She will return in 6 weeks.     01/11/17, patient states that over the last 4 days she has had bladder discomfort, straining to urinate, and bladder spasms during the nighttime hours. She does not wish to increase her time. Wants to say at 6 weeks interval. Successful DILATION to 30 Dutch with minimal pain and bleeding. Flow rate of 24 with a PVR of 136.     02/21/17,  patient states she is been straining to urinate over the past 3 days. She is also complaining of intermittent vaginal pain. She had successful CYSTOSCOPY WITH URETHRAL DILATATION to 30 British with discomfort. She wants to maintain the 6 week schedule. Flow rate of 9 with a PVR of 0. I will refer her to Dr. Barkley for evaluation of her GYN issues. Today on exam, there was significant discomfort along the posterior vaginal wall.     04/04/17, patient saw Dr. Barkley in consultation and recommended PELVIC PHYSICAL THERAPY and Botox. Patient will consider that alternative treatment and let him know of her decision. She was DILATED FOR A URETHRAL STRICTURE today with a CYSTOSCOPY WITH URETHRAL DILATATION to 30 British. Moderate discomfort. The bladder was only mildly erythematous. Flow rate of 9 with a PVR of 12. She wishes to keep a 6 week schedule. She will continue on Flomax.     05/19/17, successful CYSTOSCOPY WITH URETHRAL DILATATION to 30 British. Flow rate of 20 with a PVR of 86. Patient states that over the last 3 days she has been straining to urinate. Continue Flomax. She will return in 6 weeks.     06/28/17, successful CYSTOSCOPY WITH URETHRAL DILATATION at 30 British with minimal pain. Flow rate of 24 with a PVR 66. Patient states that over the last 3 days she has been straining to urinate with discomfort. She will continue Flomax. She will return in 6 weeks and also obtain her yearly labs.     08/15/17, successful CYSTOSCOPY WITH URETHRAL DILATATION to 30 British moderate pain. Flow rate of 7 with a PVR of 80. Patient has been straining for the last week to urinate. She will continue on Flomax. Urinalysis shows 50 red blood cells, urine cultures negative, urine cytology is negative. We will continue Flomax and urethral dilatations every 6 weeks. When she returns for her next visit we will order a renal ultrasound with respect to her hematuria     09/26/17, successful CYSTOSCOPY WITH URETHRAL DILATATION to 30  Icelandic with moderate pain. Flow rate of 3 with a PVR 14. Patient continues to strain just one week prior to dilatation. She continues on Flomax. Renal ultrasound was completely negative. We will see her again in 6 weeks.     11/08/17, successful CYSTOSCOPY WITH URETHRAL DILATATION to 30 Icelandic with mild to moderate pain. Flow rate of 17 with a PVR of 0. She has been straining over the last 3 days to urinate. She continues on Flomax. She strongly wants to continue on a 6 week regimen. She notified us that her insurance is changing and I given her recommendations for new urologist that we will be on her planned for next year.     12/16/17, successful CYSTOSCOPY WITH URETHRAL DILATATION to 30 Icelandic with mild to moderate pain. Flow rate of 6 with a PVR of 23. Again, she has been straining over the last 4-5 days with urination. She continues on Flomax. She is scheduled for major back surgery with Dr. Maldonado at the main campus [facility] immediately following [place]. Due to change in insurance, she will be continuing her urologic care with Dr. Schroeder. We will send all pertinent information to him.     05/14/18, in office urethral dilation to 30 Icelandic. Patient returns complaining of significant urgency and frequency. Patient complains of incomplete emptying. Office visit with urethral dilatation performed with minimal pain. She wishes to return in 3 months.     08/22/18, in office urethral dilation to 30 Icelandic. Patient returns complaining of straining on urination for the last week.. Patient complains of incomplete emptying. Office visit with urethral dilatation performed with minimal pain. She wishes to return in 3 months.     02/12/19, successful cystoscopy with urethral dilatation to 30 Icelandic of a very dense stricture, with pain and bleeding. Trigone and posterior bladder quite erythematous. Patient has changed her insurance and is able to return in wishes to initiate a 2 month schedule at this time. She will  discontinue her Flomax to due to side effects.     04/15/19, successful cystoscopy with urethral dilatation to 30 Georgian of a very dense stricture, with pain. The bladder has patchy erythema.. Patient wishes to initiate a 2 month schedule at this time.     06/14/19, successful cystoscopy with urethral dilatation to 30 Georgian of a urethral stricture. Minimal pain. The erythema within the bladder has improved. Patient will continue on a 2 month schedule.     09/03/19, successful cystoscopy with urethral dilatation at 30 Georgian of a urethral stricture. Minimal pain. Mild erythema within the trigone and posterior bladder. Patient states she has been straining over the last 10 days and wishes to maintain her 2 month schedule for another year. Urine culture was positive for Klebsiella sensitive to Bactrim. Cytology is negative.     01/21/20, successful cystoscopy with urethral dilatation to 30 Georgian of a urethral stricture. Minimal pain. Posterior bladder has moderate erythema. Patient complains of suprapubic pain however she is late for her schedule. She will maintain a 2 month schedule.     03/16/20, successful cystoscopy with urethral dilatation to 30 Georgian of a urethral stricture. Minimal pain with no bleeding. Trigone and bladder base has minimal erythema. Patient now complains of bilateral lower quadrant pain. Vaginal exam did not reproduce the pain. I recommended that she speak to her primary care physician or gastro-and neurologist. She does not have any reproductive organs remaining. We will see her again in 2 months.     05/18/20, successful cystoscopy with urethral dilatation at 30 Georgian of a tight urethral stricture. There was minimal pain and no bleeding. Once again the trigone and bladder base has minimal erythema. She was complaining of suprapubic pain which could not be reproduced by vaginal exam however abdominal exam along her Pfannenstiel incision did reproduce the discomfort. She will return in 2  months.     09/25/20, successful cystoscopy with urethral dilatation to 30 South Sudanese of a tight urethral stricture. Minimal pain with no bleeding. Erythema in the trigone and posterior bladder with a clear line of demarcation. No further suprapubic pain. She wishes to expand to a 3 month program.     11/23/20, successful cystoscopy with urethral dilatation to 30 [place] Sudanese of a urethral stricture. Minimal pain and no bleeding. Once again, erythema in the trigone and bladder base with a clear line of demarcation. She states she has been straining the last 2 weeks to urinate. She wishes to maintain a 3 month schedule.     February 26, 2021, successful cystoscopy with urethral dilatation at 30 [place] Sudanese of a dense urethral stricture. Moderate pain and no bleeding. Bladder based erythema has resolved however the trigone is still inflamed. Once again, she has been straining the last 2 to 3 weeks with urination, has frequency every 35 to 45 minutes, and wishes to maintain a 3-month schedule.     May 25, 2021, successful cystoscopy with urethral dilatation to 30 [place] Sudanese of a dense urethral stricture. Once again, moderate pain and no bleeding. Erythema in the trigone and posterior bladder. She states that she feels that she has been straining with obstruction to urinate recently. She will return in 3 months.     August 20, 2021, successful cystoscopy with urethral dilatation of a dense stricture to 30 [place] Sudanese. Moderate pain. Mild erythema posteriorly and moderate erythema at the trigone. Urinalysis shows small blood, urine culture no growth, urine cytology is lacking atypia. I was suggesting increasing her regimen to 4 months, however the patient states that over the last 2 weeks she has been double voiding and now for the first time in years, she is experiencing nocturia x2 with urinary straining. She wishes to maintain her 3-month regimen.     November 23, 2021, successful cystoscopy with urethral dilatation of a dense urethral  stricture to 30 German. Moderate pain. The only erythema is posteriorly which is moderate. Patient has been straining to urinate for the past week and double voiding. She wishes to maintain her 3-month schedule.     February 23, 2022, successful cystoscopy with dilatation of a dense upward sloping urethral stricture to 30 German. Moderate pain. Again, posterior erythema is identified. She wishes to maintain her 3-month schedule.     May 23, 2022, successful cystoscopy with dilatation of a dense upward sloping urethral stricture to 30 German. Minimal pain. Erythema in the trigone and bladder base. Patient is complaining of recurrent right inguinal hernia tender to palpation. I will refer her to Dr. Baldwin. She will return to me in 3 months.     7/8/2022, office visit, Dr. Gricelda Baldwin, general surgery. Evaluation of recurrent inguinal hernias. CAT scan does not reveal any recurrence of hernias and the ilioinguinal nerves are intact.     August 5, 2022, successful cystoscopy with dilatation of an upward sloping urethral stricture to 30 German. Minimal discomfort. Once again a patch of erythema just lateral to the left ureteral orifice where previous resected bladder tumor site was seen. No evidence of recurrent tumor or stones. Urine cytology is negative for malignant cells. Urine culture no growth. Urinalysis shows 6 red blood cells. Patient complained of double voiding, incomplete emptying and the need to press her suprapubic region to initiate her stream. This is over the last 4 days. She does not want to expand to a 4-month regimen.     November 21, 2022, successful cystoscopy with dilatation of an upward sloping urethral stricture to 30 German with minimal pain. The well-healed previous bladder tumor site just lateral to the left ureteral orifice was seen once again. No new tumors or stones. She wishes to maintain a 3-month regimen.     May 5, 2023, successful cystoscopy with dilatation of an upward sloping  urethral stricture to 30 Georgian with moderate discomfort. Erythema is concentrated posteriorly with a clear line of demarcation. Previous bladder tumors sites well-healed seen just lateral to the left ureteral orifice. She has been caring for her  who has been suffering from pulmonary issues leading to septic shock. She will return in 3 months.     2023, successful cystoscopy with dilatation of an upward sloping urethral stricture to 30 Georgian with minimal discomfort and no bleeding. Once again erythema is identified posteriorly. Urinalysis shows 20 red blood cells. Urine culture no growth. Urine cytology is negative for malignant cells. We will order a renal and bladder ultrasound before her next appointment. We will also repeat her urinalysis and urine culture. She is no longer straining and we agreed to expand to 4 months regimen.     FLOMAX allergy     2023, successful cystoscopy with dilatation of an upward sloping urethral stricture to 30 Georgian with minimal pain, no bleeding.  Erythema is again confined posteriorly.  Renal ultrasound shows a 3 mm nonobstructing right renal calculus.  Patient is complaining of right flank and right groin pain.  We will order a renal colic CAT scan now as well as her urine studies which she did not complete.  She wishes to maintain her 4-month schedule.     2024, renal colic CAT scan does not identify any stones or tumors     2024, successful cystoscopy with dilatation to 30 Georgian of an upward sloping dense urethral stricture with minimal pain and no bleeding.  Erythema once again identified in the base.  No further right groin pain.  She will return in 4 months.     2024, patient's mother  at the age of 89     2024, successful cystoscopy with dilatation and 30 Georgian of an upward sloping dense urethral stricture with minimal discomfort.  No bleeding.  Erythema posteriorly.  She is complaining again  of right flank and inguinal pain and we will order a renal colic CAT scan again.  Urinalysis does show 20 red blood cells.  Urine culture no growth.  Urine cytology was lacking atypia.  She wishes to maintain her 4-month schedule as she was straining to urinate over the last 15 days.     September 5, 2024, telephone call, renal colic CAT scan does not identify any urinary tract calculi or hydroureteronephrosis     January 3, 2025, successful cystoscopy with dilatation to 30 Estonian of an upward sloping dense urethral stricture.  Minimal discomfort and no bleeding.  Posterior erythema.  She does continue to to experience intermittent right flank pain although no urologic source identified on CAT scan.  She wishes to maintain her 4-month schedule.    May 13, 2025, successful cystoscopy with dilatation of an upward sloping dense urethral stricture to 30 Estonian.  Minimal discomfort and no bleeding.  Posterior erythema persists.  She states that over the past 10 days she has been straining to urinate and also spraying.  She wishes to maintain her 4-month schedule.     PLAN:     #1 patient will return in 4 months for cystoscopy with urethral dilatation at 30 Estonian. She will receive MACRODANTIN 50 mg PROPHYLAXIS 4 doses.      #2 in August 2025 with urine studies. Consider expanding to 5 months at that time.     Physical Exam  Vitals and nursing note reviewed. Exam conducted with a chaperone present.   Constitutional:       Appearance: Normal appearance.   HENT:      Head: Normocephalic and atraumatic.   Pulmonary:      Effort: Pulmonary effort is normal.   Abdominal:      Palpations: Abdomen is soft.      Tenderness: There is right CVA tenderness.   Genitourinary:     General: Normal vulva.      Vagina: No vaginal discharge.   Musculoskeletal:         General: Normal range of motion.      Cervical back: Normal range of motion and neck supple.   Neurological:      General: No focal deficit present.      Mental Status: She  "is alert and oriented to person, place, and time.   Psychiatric:         Mood and Affect: Mood normal.         Behavior: Behavior normal.        This note was created with voice-recognition software and was not corrected for typographical or grammatical errors.     Patient ID: Jennifer Gonzalez is a 64 y.o. female.    Procedures  Pt took macrodantin 50mg po as prescribed  Anesthesia: Local 2% Lidocaine  Instruments: 6F flexible disposable Cystoscope, female sounds     Pt brought to procedure room and placed in dorsal lithotomy position. Pt draped and prepped in normal sterile fashion. 5ml lidocaine instilled into urethral meatus and 5ml instilled into Vagina. Pt tolerated well.     I was present as chaperone for the entirety of the procedure  Irasema Ulloa   Cystoscopy performed by Dr. Saeid Larson        Bedside \"Time Out\" Verification   Today's Date: 05/13/2025 I attest that this time out verification took place prior to the procedure.   Procedure: Cysto/dil  RN/LPN/MA: RADHA   Provider: [facility].   Verified By: MYLES GARCIA and Provider, Dr. Saeid Larson.   Prior to the start of the procedure a time out was taken and the following were verified: the identity of the patient using two patient identifiers, the correct procedure, the correct site marked as indicated, the correct positioning for the patient and the correct equipment was obtained.   Cystoscopy - Jennifer Gonzalez   identified using two (2) forms of identification.   Procedure: diagnostic Cystourethroscopy   Procedure Note: Time Started: 10:30am Time Completed: 10:44 AM  Indications for procedure: Cysto- Urethral Stricture    Discussed with patient: Risks, benefits, and alternative were discussed in detail. Patient appears to understand and agrees to proceed. Patient has signed the procedure consent form.    CYSTOSCOPY:    Cystoscopy today reveals a a dense, upward sloping urethral stricture dilated to 30 Divehi with minimal discomfort and no bleeding.  Once inside " the bladder, both ureteral orifices were identified.  Full examination of the bladder did not reveal any tumors or stones.  Posterior erythema persists.

## 2025-05-13 NOTE — PROGRESS NOTES
Provider Impressions     64 year-old white female originally seen on 08/17/04. Patient had a long history of URINARY TRACT INFECTIONS, DYSURIA, URETHRAL STRICTURES. No family history of breast or prostate cancer. The patient does smoke cigarettes on a social basis.     In 2002, patient underwent a total abdominal hysterectomy with bilateral salpingo-oophorectomy (GLORIA/ BSO) for UTERINE CANCER. She also has developed a CYSTOCELE grade 2 and a RECTOCELE grade 2. She was receiving CYSTOSCOPIES with URETHRAL DILATATIONS UNDER ANESTHESIA every 3-6 months.     Then, the patient DISAPPEARED.     She returned in 2014 with RHEUMATOID ARTHRITIS, THYROID DISEASE, and other medical problems.      On 06/05/14 we performed a CYSTOSCOPY with URETHRAL DILATATION under anesthesia. She strictured and created scar tissue within 3 weeks. We are attempting to maintain her in the office setting.She is also titrating her Flomax doses.     10/01/14 CYSTOSCOPY with URETHRAL DILATATION to 30 Wallisian. Patient had been noticing a decreased flow stream for the past 2 days prior.     11/04/14 CYSTOSCOPY with URETHRAL DILATATION to 30 Wallisian. No bleeding. Patient states that she was straining to urinate over the past 4 days     12/03/14, patient recently diagnosed with MEDITERRANEAN FEVER. She states now for the past 2 days she said significant DYSURIA and straining to urinate. She was successfully DILATED to 30 Wallisian with minimal bleeding. No family history of breast or prostate cancer. Presently an occasional cigarette smoker.     01/07/14, patient is now being treated with colchicine. She states that for the first time, she has not had straining towards the end of her stream. 12 Wallisian STRICTURE successfully DILATED to 30 Wallisian. However, the lower half of the bladder was erythematous. This is surprising in light of the fact that the patient had no symptoms. Fortunately, her postvoid residual is only 16 cc, a dramatic decrease from the 2-300  cc PVRs.     03/27/15, May 8 2015, 06/08/15, successful DILATION of to 30 Venezuelan, normal-appearing bladder, some difficulty,      03/27/15, May 8 2015, 06/08/15, 08/31/15 successful DILATION to 30 Venezuelan, normal-appearing bladder, some difficulty, flowrate 29 cc/s, PVR 0 cc.      COMPUTERS DOWN  COMPUTERS DOWN  COMPUTERS DOWN      10/05/15, patient states that she is noticed a decreased flow of stream over the past 5 days. This is accompanied with significant NOCTURIA. Successfully DILATED to 30 Venezuelan today. Good flow rate of 16 cc/s. She will continue on the five-week regimen.     01/04/16 successful DILATATION to 30 Venezuelan with pain today. She noticed that she is spraying when she urinates. We will increase to 6 weeks at this time. Flow of 7 with a PVR 0.     02/15/16, successful DILATATION to 30 Venezuelan. Patient has been having some FREQUENCY over the last week and NOCTURIA. There was minimal pain. Some ERYTHEMA along the base of the bladder. We will continue at a 6 week regimen. Flow rate of 32 with a PVR of 90     03/28/16, successful DILATATION to 30 Venezuelan. Less FREQUENCY. Only 3 days of SPRAYING during urination prior to today's DILATATION. Minimal pain. Trigone and base are ERYTHEMATOUS. Patient states that she has discomfort with the first void of the day. We will continue at a 6 week regimen. Flow rate of 14 with a PVR of 47. She will be maintaining Flomax.     05/03/16, successful CYSTOSCOPY with URETHRAL DILATATION to 30 Venezuelan. Once again, the patient states she was experiencing SUPRAPUBIC PAIN and spraying with urination over the past 3 days prior to this procedure. She wishes to stay at a 6 week regimen. Flow rate of 13 with a PVR 13     06/13/16, successful CYSTOSCOPY with URETHRAL DILATATION of to 30 Venezuelan. Patient had minimal pain, still mild diffuse ERYTHEMA. Flow rate of 34 with a PVR of 0. Patient states that over the last 4 days she began spraying her urine. She wishes to maintain a 6 week  regimen.     07/27/16, successful CYSTOSCOPY with URETHRAL DILATATION to 30 Equ[place] Guinean with pain. The patient states that she has been STRAINING to urinate. Flow rate of 9 with a PVR of 0. She will return in 6 weeks and also have her yearly laboratory testing.     09/12/16, all urine studies are normal. Successful CYSTOSCOPY with URETHRAL DILATATION at 30 Equ[place] Guinean. The patient states that she is had a spraying stream and disuria. She is 1 week late for her dilation due to scheduling. Flow rate of 11 with a PVR of 0. We will maintain a 6 week regimen.     10/18/16, CYSTOSCOPY with URETHRAL DILATATION to 30 [place] Guinean. Significantly more pain today than routine. However, virtually no erythema. Flow rate of 13 with a PVR of 0. We will maintain the 6 week regimen. She will continue on Flomax.     11/29/16, CYSTOSCOPY with URETHRAL DILATATION to 30 [place] Guinean. Somewhat painful. The patient states that over the last 48 hours she has been straining severely to urinate. She will continue on Flomax. PVR 56. She will return in 6 weeks.     01/11/17, patient states that over the last 4 days she has had bladder discomfort, straining to urinate, and bladder spasms during the nighttime hours. She does not wish to increase her time. Wants to say at 6 weeks interval. Successful DILATION to 30 Equ[place] Guinean with minimal pain and bleeding. Flow rate of 24 with a PVR of 136.     02/21/17, patient states she is been straining to urinate over the past 3 days. She is also complaining of intermittent vaginal pain. She had successful CYSTOSCOPY WITH URETHRAL DILATATION to 30 Equ[place] Guinean with discomfort. She wants to maintain the 6 week schedule. Flow rate of 9 with a PVR of 0. I will refer her to Dr. Barkley for evaluation of her GYN issues. Today on exam, there was significant discomfort along the posterior vaginal wall.     04/04/17, patient saw Dr. Barkley in consultation and recommended PELVIC PHYSICAL THERAPY and Botox. Patient will consider that alternative  treatment and let him know of her decision. She was DILATED FOR A URETHRAL STRICTURE today with a CYSTOSCOPY WITH URETHRAL DILATATION to 30 Eritrean. Moderate discomfort. The bladder was only mildly erythematous. Flow rate of 9 with a PVR of 12. She wishes to keep a 6 week schedule. She will continue on Flomax.     05/19/17, successful CYSTOSCOPY WITH URETHRAL DILATATION to 30 Eritrean. Flow rate of 20 with a PVR of 86. Patient states that over the last 3 days she has been straining to urinate. Continue Flomax. She will return in 6 weeks.     06/28/17, successful CYSTOSCOPY WITH URETHRAL DILATATION at 30 Eritrean with minimal pain. Flow rate of 24 with a PVR 66. Patient states that over the last 3 days she has been straining to urinate with discomfort. She will continue Flomax. She will return in 6 weeks and also obtain her yearly labs.     08/15/17, successful CYSTOSCOPY WITH URETHRAL DILATATION to 30 Eritrean moderate pain. Flow rate of 7 with a PVR of 80. Patient has been straining for the last week to urinate. She will continue on Flomax. Urinalysis shows 50 red blood cells, urine cultures negative, urine cytology is negative. We will continue Flomax and urethral dilatations every 6 weeks. When she returns for her next visit we will order a renal ultrasound with respect to her hematuria     09/26/17, successful CYSTOSCOPY WITH URETHRAL DILATATION to 30 Eritrean with moderate pain. Flow rate of 3 with a PVR 14. Patient continues to strain just one week prior to dilatation. She continues on Flomax. Renal ultrasound was completely negative. We will see her again in 6 weeks.     11/08/17, successful CYSTOSCOPY WITH URETHRAL DILATATION to 30 Eritrean with mild to moderate pain. Flow rate of 17 with a PVR of 0. She has been straining over the last 3 days to urinate. She continues on Flomax. She strongly wants to continue on a 6 week regimen. She notified us that her insurance is changing and I given her recommendations for new  urologist that we will be on her planned for next year.     12/16/17, successful CYSTOSCOPY WITH URETHRAL DILATATION to 30 Gabonese with mild to moderate pain. Flow rate of 6 with a PVR of 23. Again, she has been straining over the last 4-5 days with urination. She continues on Flomax. She is scheduled for major back surgery with Dr. Maldonado at the main campus [facility] immediately following [place]. Due to change in insurance, she will be continuing her urologic care with Dr. Schroeder. We will send all pertinent information to him.     05/14/18, in office urethral dilation to 30 Gabonese. Patient returns complaining of significant urgency and frequency. Patient complains of incomplete emptying. Office visit with urethral dilatation performed with minimal pain. She wishes to return in 3 months.     08/22/18, in office urethral dilation to 30 Gabonese. Patient returns complaining of straining on urination for the last week.. Patient complains of incomplete emptying. Office visit with urethral dilatation performed with minimal pain. She wishes to return in 3 months.     02/12/19, successful cystoscopy with urethral dilatation to 30 Gabonese of a very dense stricture, with pain and bleeding. Trigone and posterior bladder quite erythematous. Patient has changed her insurance and is able to return in wishes to initiate a 2 month schedule at this time. She will discontinue her Flomax to due to side effects.     04/15/19, successful cystoscopy with urethral dilatation to 30 Gabonese of a very dense stricture, with pain. The bladder has patchy erythema.. Patient wishes to initiate a 2 month schedule at this time.     06/14/19, successful cystoscopy with urethral dilatation to 30 Gabonese of a urethral stricture. Minimal pain. The erythema within the bladder has improved. Patient will continue on a 2 month schedule.     09/03/19, successful cystoscopy with urethral dilatation at 30 Gabonese of a urethral stricture. Minimal pain. Mild erythema  within the trigone and posterior bladder. Patient states she has been straining over the last 10 days and wishes to maintain her 2 month schedule for another year. Urine culture was positive for Klebsiella sensitive to Bactrim. Cytology is negative.     01/21/20, successful cystoscopy with urethral dilatation to 30 Libyan of a urethral stricture. Minimal pain. Posterior bladder has moderate erythema. Patient complains of suprapubic pain however she is late for her schedule. She will maintain a 2 month schedule.     03/16/20, successful cystoscopy with urethral dilatation to 30 Libyan of a urethral stricture. Minimal pain with no bleeding. Trigone and bladder base has minimal erythema. Patient now complains of bilateral lower quadrant pain. Vaginal exam did not reproduce the pain. I recommended that she speak to her primary care physician or gastro-and neurologist. She does not have any reproductive organs remaining. We will see her again in 2 months.     05/18/20, successful cystoscopy with urethral dilatation at 30 Libyan of a tight urethral stricture. There was minimal pain and no bleeding. Once again the trigone and bladder base has minimal erythema. She was complaining of suprapubic pain which could not be reproduced by vaginal exam however abdominal exam along her Pfannenstiel incision did reproduce the discomfort. She will return in 2 months.     09/25/20, successful cystoscopy with urethral dilatation to 30 Libyan of a tight urethral stricture. Minimal pain with no bleeding. Erythema in the trigone and posterior bladder with a clear line of demarcation. No further suprapubic pain. She wishes to expand to a 3 month program.     11/23/20, successful cystoscopy with urethral dilatation to 30 Libyan of a urethral stricture. Minimal pain and no bleeding. Once again, erythema in the trigone and bladder base with a clear line of demarcation. She states she has been straining the last 2 weeks to urinate. She wishes  to maintain a 3 month schedule.     February 26, 2021, successful cystoscopy with urethral dilatation at 30 Micronesian of a dense urethral stricture. Moderate pain and no bleeding. Bladder based erythema has resolved however the trigone is still inflamed. Once again, she has been straining the last 2 to 3 weeks with urination, has frequency every 35 to 45 minutes, and wishes to maintain a 3-month schedule.     May 25, 2021, successful cystoscopy with urethral dilatation to 30 Micronesian of a dense urethral stricture. Once again, moderate pain and no bleeding. Erythema in the trigone and posterior bladder. She states that she feels that she has been straining with obstruction to urinate recently. She will return in 3 months.     August 20, 2021, successful cystoscopy with urethral dilatation of a dense stricture to 30 Micronesian. Moderate pain. Mild erythema posteriorly and moderate erythema at the trigone. Urinalysis shows small blood, urine culture no growth, urine cytology is lacking atypia. I was suggesting increasing her regimen to 4 months, however the patient states that over the last 2 weeks she has been double voiding and now for the first time in years, she is experiencing nocturia x2 with urinary straining. She wishes to maintain her 3-month regimen.     November 23, 2021, successful cystoscopy with urethral dilatation of a dense urethral stricture to 30 Micronesian. Moderate pain. The only erythema is posteriorly which is moderate. Patient has been straining to urinate for the past week and double voiding. She wishes to maintain her 3-month schedule.     February 23, 2022, successful cystoscopy with dilatation of a dense upward sloping urethral stricture to 30 Micronesian. Moderate pain. Again, posterior erythema is identified. She wishes to maintain her 3-month schedule.     May 23, 2022, successful cystoscopy with dilatation of a dense upward sloping urethral stricture to 30 Micronesian. Minimal pain. Erythema in the trigone and  bladder base. Patient is complaining of recurrent right inguinal hernia tender to palpation. I will refer her to Dr. Baldwin. She will return to me in 3 months.     7/8/2022, office visit, Dr. Gricelda Baldwin, general surgery. Evaluation of recurrent inguinal hernias. CAT scan does not reveal any recurrence of hernias and the ilioinguinal nerves are intact.     August 5, 2022, successful cystoscopy with dilatation of an upward sloping urethral stricture to 30 Algerian. Minimal discomfort. Once again a patch of erythema just lateral to the left ureteral orifice where previous resected bladder tumor site was seen. No evidence of recurrent tumor or stones. Urine cytology is negative for malignant cells. Urine culture no growth. Urinalysis shows 6 red blood cells. Patient complained of double voiding, incomplete emptying and the need to press her suprapubic region to initiate her stream. This is over the last 4 days. She does not want to expand to a 4-month regimen.     November 21, 2022, successful cystoscopy with dilatation of an upward sloping urethral stricture to 30 Algerian with minimal pain. The well-healed previous bladder tumor site just lateral to the left ureteral orifice was seen once again. No new tumors or stones. She wishes to maintain a 3-month regimen.     May 5, 2023, successful cystoscopy with dilatation of an upward sloping urethral stricture to 30 Algerian with moderate discomfort. Erythema is concentrated posteriorly with a clear line of demarcation. Previous bladder tumors sites well-healed seen just lateral to the left ureteral orifice. She has been caring for her  who has been suffering from pulmonary issues leading to septic shock. She will return in 3 months.     August 7, 2023, successful cystoscopy with dilatation of an upward sloping urethral stricture to 30 Algerian with minimal discomfort and no bleeding. Once again erythema is identified posteriorly. Urinalysis shows 20 red blood cells. Urine  culture no growth. Urine cytology is negative for malignant cells. We will order a renal and bladder ultrasound before her next appointment. We will also repeat her urinalysis and urine culture. She is no longer straining and we agreed to expand to 4 months regimen.     FLOMAX allergy     2023, successful cystoscopy with dilatation of an upward sloping urethral stricture to 30 Tanzanian with minimal pain, no bleeding.  Erythema is again confined posteriorly.  Renal ultrasound shows a 3 mm nonobstructing right renal calculus.  Patient is complaining of right flank and right groin pain.  We will order a renal colic CAT scan now as well as her urine studies which she did not complete.  She wishes to maintain her 4-month schedule.     2024, renal colic CAT scan does not identify any stones or tumors     2024, successful cystoscopy with dilatation to 30 Tanzanian of an upward sloping dense urethral stricture with minimal pain and no bleeding.  Erythema once again identified in the base.  No further right groin pain.  She will return in 4 months.     2024, patient's mother  at the age of 89     2024, successful cystoscopy with dilatation and 30 Tanzanian of an upward sloping dense urethral stricture with minimal discomfort.  No bleeding.  Erythema posteriorly.  She is complaining again of right flank and inguinal pain and we will order a renal colic CAT scan again.  Urinalysis does show 20 red blood cells.  Urine culture no growth.  Urine cytology was lacking atypia.  She wishes to maintain her 4-month schedule as she was straining to urinate over the last 15 days.     2024, telephone call, renal colic CAT scan does not identify any urinary tract calculi or hydroureteronephrosis     January 3, 2025, successful cystoscopy with dilatation to 30 Tanzanian of an upward sloping dense urethral stricture.  Minimal discomfort and no bleeding.  Posterior erythema.  She  does continue to to experience intermittent right flank pain although no urologic source identified on CAT scan.  She wishes to maintain her 4-month schedule.    May 13, 2025, successful cystoscopy with dilatation of an upward sloping dense urethral stricture to 30 Singaporean.  Minimal discomfort and no bleeding.  Posterior erythema persists.  She states that over the past 10 days she has been straining to urinate and also spraying.  She wishes to maintain her 4-month schedule.     PLAN:     #1 patient will return in 4 months for cystoscopy with urethral dilatation at 30 Singaporean. She will receive MACRODANTIN 50 mg PROPHYLAXIS 4 doses.      #2 in August 2025 with urine studies. Consider expanding to 5 months at that time.     Physical Exam  Vitals and nursing note reviewed. Exam conducted with a chaperone present.   Constitutional:       Appearance: Normal appearance.   HENT:      Head: Normocephalic and atraumatic.   Pulmonary:      Effort: Pulmonary effort is normal.   Abdominal:      Palpations: Abdomen is soft.      Tenderness: There is right CVA tenderness.   Genitourinary:     General: Normal vulva.      Vagina: No vaginal discharge.   Musculoskeletal:         General: Normal range of motion.      Cervical back: Normal range of motion and neck supple.   Neurological:      General: No focal deficit present.      Mental Status: She is alert and oriented to person, place, and time.   Psychiatric:         Mood and Affect: Mood normal.         Behavior: Behavior normal.        This note was created with voice-recognition software and was not corrected for typographical or grammatical errors.

## 2025-05-13 NOTE — PROGRESS NOTES
"Patient ID: Jennifer Gonzalez is a 64 y.o. female.    Procedures  Pt took macrodantin 50mg po as prescribed  Anesthesia: Local 2% Lidocaine  Instruments: 6F flexible disposable Cystoscope, female sounds     Pt brought to procedure room and placed in dorsal lithotomy position. Pt draped and prepped in normal sterile fashion. 5ml lidocaine instilled into urethral meatus and 5ml instilled into Vagina. Pt tolerated well.     I was present as chaperone for the entirety of the procedure  Irasema Ulloa   Cystoscopy performed by Dr. Saeid Larson        Bedside \"Time Out\" Verification   Today's Date: 05/13/2025 I attest that this time out verification took place prior to the procedure.   Procedure: Cysto/dil  RN/LPN/MA: RADHA   Provider: [facility].   Verified By: MYLES GARCIA and Provider, Dr. Saeid Larson.   Prior to the start of the procedure a time out was taken and the following were verified: the identity of the patient using two patient identifiers, the correct procedure, the correct site marked as indicated, the correct positioning for the patient and the correct equipment was obtained.   Cystoscopy - Jennifer Gonzalez   identified using two (2) forms of identification.   Procedure: diagnostic Cystourethroscopy   Procedure Note: Time Started: 10:30am Time Completed: 10:44 AM  Indications for procedure: Cysto- Urethral Stricture    Discussed with patient: Risks, benefits, and alternative were discussed in detail. Patient appears to understand and agrees to proceed. Patient has signed the procedure consent form.    CYSTOSCOPY:    Cystoscopy today reveals a a dense, upward sloping urethral stricture dilated to 30 Georgian with minimal discomfort and no bleeding.  Once inside the bladder, both ureteral orifices were identified.  Full examination of the bladder did not reveal any tumors or stones.  Posterior erythema persists.  "

## 2025-05-13 NOTE — PATIENT INSTRUCTIONS
Patient Discussion/Summary     Good to see you again. You were successfully dilated to 30 Latvian.  Your bladder does show improvement.  You have been complaining of urinary straining and spraying of urine over the past 10 days and would like to maintain your present regimen.  Therefore, we will see you again in 4 months.      This note was created with voice-recognition software and was not corrected for typographical or grammatical errors.

## 2025-05-23 ENCOUNTER — APPOINTMENT (OUTPATIENT)
Dept: PRIMARY CARE | Facility: CLINIC | Age: 65
End: 2025-05-23
Payer: MEDICARE

## 2025-05-23 VITALS
OXYGEN SATURATION: 97 % | HEART RATE: 64 BPM | TEMPERATURE: 97 F | BODY MASS INDEX: 19.89 KG/M2 | WEIGHT: 130.8 LBS | SYSTOLIC BLOOD PRESSURE: 150 MMHG | DIASTOLIC BLOOD PRESSURE: 84 MMHG

## 2025-05-23 DIAGNOSIS — G35 MULTIPLE SCLEROSIS (MULTI): ICD-10-CM

## 2025-05-23 DIAGNOSIS — I25.10 CORONARY ARTERY DISEASE INVOLVING NATIVE CORONARY ARTERY OF NATIVE HEART, UNSPECIFIED WHETHER ANGINA PRESENT: ICD-10-CM

## 2025-05-23 DIAGNOSIS — M19.90 OSTEOARTHRITIS, UNSPECIFIED OSTEOARTHRITIS TYPE, UNSPECIFIED SITE: ICD-10-CM

## 2025-05-23 DIAGNOSIS — I10 HTN (HYPERTENSION), BENIGN: Primary | ICD-10-CM

## 2025-05-23 DIAGNOSIS — G89.29 NECK PAIN, CHRONIC: ICD-10-CM

## 2025-05-23 DIAGNOSIS — G20.A1 PARKINSON DISEASE (MULTI): ICD-10-CM

## 2025-05-23 DIAGNOSIS — M54.2 NECK PAIN, CHRONIC: ICD-10-CM

## 2025-05-23 DIAGNOSIS — G20.A1 PARKINSON'S DISEASE, UNSPECIFIED WHETHER DYSKINESIA PRESENT, UNSPECIFIED WHETHER MANIFESTATIONS FLUCTUATE: ICD-10-CM

## 2025-05-23 DIAGNOSIS — K21.9 GERD WITHOUT ESOPHAGITIS: ICD-10-CM

## 2025-05-23 DIAGNOSIS — E78.00 HYPERCHOLESTEROLEMIA: ICD-10-CM

## 2025-05-23 DIAGNOSIS — C55 MALIGNANT NEOPLASM OF UTERUS, UNSPECIFIED SITE (MULTI): ICD-10-CM

## 2025-05-23 DIAGNOSIS — F51.01 PRIMARY INSOMNIA: ICD-10-CM

## 2025-05-23 PROCEDURE — 3077F SYST BP >= 140 MM HG: CPT | Performed by: FAMILY MEDICINE

## 2025-05-23 PROCEDURE — 99213 OFFICE O/P EST LOW 20 MIN: CPT | Performed by: FAMILY MEDICINE

## 2025-05-23 PROCEDURE — G2211 COMPLEX E/M VISIT ADD ON: HCPCS | Performed by: FAMILY MEDICINE

## 2025-05-23 PROCEDURE — 3079F DIAST BP 80-89 MM HG: CPT | Performed by: FAMILY MEDICINE

## 2025-05-23 RX ORDER — VALSARTAN 80 MG/1
80 TABLET ORAL DAILY
Qty: 100 TABLET | Refills: 3 | Status: SHIPPED | OUTPATIENT
Start: 2025-05-23 | End: 2025-05-23 | Stop reason: SDUPTHER

## 2025-05-23 RX ORDER — VALSARTAN 80 MG/1
80 TABLET ORAL DAILY
Qty: 30 TABLET | Refills: 3 | Status: SHIPPED | OUTPATIENT
Start: 2025-05-23 | End: 2026-06-27

## 2025-05-23 RX ORDER — ROPINIROLE 1 MG/1
1-2 TABLET, FILM COATED ORAL 3 TIMES DAILY
Qty: 270 TABLET | Refills: 1 | Status: SHIPPED | OUTPATIENT
Start: 2025-05-23

## 2025-05-23 RX ORDER — HYDROCODONE BITARTRATE AND IBUPROFEN 7.5; 2 MG/1; MG/1
1 TABLET, FILM COATED ORAL EVERY 4 HOURS PRN
Qty: 150 TABLET | Refills: 0 | Status: SHIPPED | OUTPATIENT
Start: 2025-05-23

## 2025-05-23 ASSESSMENT — PATIENT HEALTH QUESTIONNAIRE - PHQ9
1. LITTLE INTEREST OR PLEASURE IN DOING THINGS: NOT AT ALL
2. FEELING DOWN, DEPRESSED OR HOPELESS: NOT AT ALL
SUM OF ALL RESPONSES TO PHQ9 QUESTIONS 1 AND 2: 0

## 2025-05-23 NOTE — PROGRESS NOTES
Subjective   Patient ID: Jennifer Gonzalez is a 64 y.o. female who presents for Pain, Insomnia, and Restless Legs.  HPI    Patient state that she would like to talk about her Requip medication due to having to take an extra one during the day.    Patient presents today for a follow-up on pain. Is taking Vicoprofen. States she has no concerns with this medication. Rates the pain a 9/10 over the past 7 days. Reports that the medication gives 90% pain control/relief. [place] today. Controlled substance contract signed 02/24/25. Last took it this morning.    Patient presents today for a follow-up on Insomnia. Is taking Ambien. No issues with the medication. Rates the insomnia a 10/10 over the past 7 days. Reports that the medication gives 100% insomnia control/relief. [place] today. Controlled substance contract signed on 02/24/25. Last took medication last night.    Taking current medications which were reviewed.  Problem list discussed.    Overall doing well.  Eating okay.  Staying active.    Has no other new problem /question.     ROS  Constitutional- No activity change. No appetite change.  Eyes- Denies vision changes.  Respiratory- No shortness of breath.  Cardiovascular- No palpitations. No chest pain.  GI- No nausea or vomiting. No diarrhea or constipation. Denies abdominal pain.  Musculoskeletal- Denies joint swelling.  Neurological- Denies headaches. Denies dizziness.  Skin- No rashes.  Psychiatric/Behavioral- Denies significant anxiety, or depressed mood.     Objective     /84 (BP Location: Right arm, Patient Position: Sitting, BP Cuff Size: Adult long)   Pulse 64   Temp 36.1 °C (97 °F) (Temporal)   Wt 59.3 kg (130 lb 12.8 oz)   SpO2 97%   BMI 19.89 kg/m²     Allergies[1]    Constitutional-- Well-nourished.  No distress  Head- unremarkable.  Eyes- PERRL.  Conjunctiva normal.  Nose- Normal.  No rhinorrhea noted.  Throat- Oropharynx is clear and moist.  Neck- Supple with no thyromegaly.  No  significant cervical adenopathy noted.  Pulmonary/Chest- Breath sounds normal with normal effort.  No wheezing.  Heart- Regular rate and rhythm.  No murmur.  Abdomen- Soft and non-tender.  No masses noted.  Musculoskeletal-chronic low back and neck pain exacerbated with range of motion.  Extremities-trace leg swelling to above the ankles  Neurological- Alert.  No tremor  Skin- Warm.  No rashes.  Psychiatric/Behavioral- Mood and affect normal.  Behavior normal.     Assessment/Plan   1. HTN (hypertension), benign  valsartan (Diovan) 80 mg tablet    DISCONTINUED: valsartan (Diovan) 80 mg tablet      2. Multiple sclerosis (Multi)        3. Coronary artery disease involving native coronary artery of native heart, unspecified whether angina present        4. Hypercholesterolemia        5. GERD without esophagitis        6. Parkinson's disease, unspecified whether dyskinesia present, unspecified whether manifestations fluctuate        7. BMI (body mass index), pediatric, less than 5th percentile for age        8. Primary insomnia        9. Osteoarthritis, unspecified osteoarthritis type, unspecified site  HYDROcodone-ibuprofen (Vicoprofen) 7.5-200 mg tablet      10. Parkinson disease (Multi)  rOPINIRole (Requip) 1 mg tablet      11. Neck pain, chronic  HYDROcodone-ibuprofen (Vicoprofen) 7.5-200 mg tablet      12. Malignant neoplasm of uterus, unspecified site (Multi)               Long talk. Treatment options reviewed.    Reviewed most recent lab work with patient. Advised patient to remain up to date on routine maintenance and health screening.  Maintain appointments with specialists as scheduled.  Advised patient to remain up to date on immunizations.     Discussed neck pain. Discussed Osteoarthritis controlled. Educated the patient on osteoarthritis care and management. Educated on muscle strength and exercise. Patient  understands to take the least amount of pain medication.     Hypertension controlled.     Parkinson's  stable  GERD stable    Discussed importance of natural sources of nutrition.  Advised patient to consume vegetables, salads, fruits, nuts, and proteins such as fish and chicken.  Discussed portion control.      Discussed the importance of routine stretching and exercise.     Continue and take your medications as prescribed.    Health Maintenance issues discussed.    Importance of healthy diet and regular exercise regimen discussed.    We will contact you with any test results ordered. If you do not hear from us, please contact.    Follow-up as instructed or sooner if any problems or symptoms do not resolve as expected.    All medical record entries made by the Scribe were at my direction and personally dictated by me.    I have reviewed the chart and agree that the record accurately reflects my personal performance of the history, physical exam, discussion, and plan.        Scribe Attestation  By signing my name below, I, Anna Marley   attest that this documentation has been prepared under the direction and in the presence of Miguel Magaña MD.         [1]   Allergies  Allergen Reactions    Tamsulosin Dizziness    Levofloxacin Hives, Other and Swelling     JOINT SWELLING    Morphine Itching    Pentazocine Other    Cephalexin Hives and Rash    Diphenhydramine Anxiety and Unknown    Penicillins Hives and Rash

## 2025-06-23 DIAGNOSIS — M54.2 NECK PAIN, CHRONIC: ICD-10-CM

## 2025-06-23 DIAGNOSIS — G89.29 NECK PAIN, CHRONIC: ICD-10-CM

## 2025-06-23 DIAGNOSIS — M19.90 OSTEOARTHRITIS, UNSPECIFIED OSTEOARTHRITIS TYPE, UNSPECIFIED SITE: ICD-10-CM

## 2025-06-23 RX ORDER — HYDROCODONE BITARTRATE AND IBUPROFEN 7.5; 2 MG/1; MG/1
1 TABLET, FILM COATED ORAL EVERY 4 HOURS PRN
Qty: 150 TABLET | Refills: 0 | Status: SHIPPED | OUTPATIENT
Start: 2025-06-23 | End: 2025-06-23 | Stop reason: SDUPTHER

## 2025-06-23 RX ORDER — HYDROCODONE BITARTRATE AND IBUPROFEN 7.5; 2 MG/1; MG/1
1 TABLET, FILM COATED ORAL EVERY 4 HOURS PRN
Qty: 150 TABLET | Refills: 0 | Status: SHIPPED | OUTPATIENT
Start: 2025-06-23

## 2025-06-23 NOTE — TELEPHONE ENCOUNTER
CONTROLLED RX REQUEST     Medication pended   Last office visit 5/23/2025   NEXT OFFICE VISIT 8/20/2025   CSA, uds --- 2/24/25       Jennifer [id number] [id number] Clinical Support Staff (supporting Miguel Magaña MD) (Selected Message)        6/20/25  7:38 PM  Could you please refill my Vicoprofen 7.5/200 and send it to [facility]   Thank you

## 2025-07-22 DIAGNOSIS — M19.90 OSTEOARTHRITIS, UNSPECIFIED OSTEOARTHRITIS TYPE, UNSPECIFIED SITE: ICD-10-CM

## 2025-07-22 DIAGNOSIS — M54.2 NECK PAIN, CHRONIC: ICD-10-CM

## 2025-07-22 DIAGNOSIS — G89.29 NECK PAIN, CHRONIC: ICD-10-CM

## 2025-07-22 RX ORDER — HYDROCODONE BITARTRATE AND IBUPROFEN 7.5; 2 MG/1; MG/1
1 TABLET, FILM COATED ORAL EVERY 4 HOURS PRN
Qty: 150 TABLET | Refills: 0 | Status: SHIPPED | OUTPATIENT
Start: 2025-07-22

## 2025-07-22 NOTE — TELEPHONE ENCOUNTER
CONTROLLED RX REQUEST     Medication pended   Last office visit 5/23/2025   NEXT OFFICE VISIT 8/20/2025   CSA, uds --- 2/24/25     Jennifer [id number] [id number] Clinical Support Staff (supporting Miguel Magaña MD) (Selected Message)        7/21/25  9:34 AM  Could you please refill my Vicoprofen 7.5/200 and send it to [facility]  THANK YOU

## 2025-08-05 DIAGNOSIS — Z12.31 ENCOUNTER FOR SCREENING MAMMOGRAM FOR BREAST CANCER: ICD-10-CM

## 2025-08-17 DIAGNOSIS — G20.A1 PARKINSON DISEASE (MULTI): ICD-10-CM

## 2025-08-19 RX ORDER — ROPINIROLE 1 MG/1
TABLET, FILM COATED ORAL
Qty: 270 TABLET | Refills: 1 | Status: SHIPPED | OUTPATIENT
Start: 2025-08-19

## 2025-08-20 ENCOUNTER — APPOINTMENT (OUTPATIENT)
Dept: PRIMARY CARE | Facility: CLINIC | Age: 65
End: 2025-08-20
Payer: MEDICARE

## 2025-08-20 ASSESSMENT — ENCOUNTER SYMPTOMS: DEPRESSION: 0

## 2025-09-17 ENCOUNTER — APPOINTMENT (OUTPATIENT)
Dept: UROLOGY | Facility: CLINIC | Age: 65
End: 2025-09-17
Payer: MEDICARE

## 2025-11-19 ENCOUNTER — APPOINTMENT (OUTPATIENT)
Dept: PRIMARY CARE | Facility: CLINIC | Age: 65
End: 2025-11-19
Payer: MEDICARE